# Patient Record
Sex: MALE | Employment: OTHER | URBAN - METROPOLITAN AREA
[De-identification: names, ages, dates, MRNs, and addresses within clinical notes are randomized per-mention and may not be internally consistent; named-entity substitution may affect disease eponyms.]

---

## 2017-01-01 ENCOUNTER — APPOINTMENT (OUTPATIENT)
Dept: RADIOLOGY | Facility: MEDICAL CENTER | Age: 62
DRG: 208 | End: 2017-01-01
Attending: INTERNAL MEDICINE
Payer: COMMERCIAL

## 2017-01-01 ENCOUNTER — APPOINTMENT (OUTPATIENT)
Dept: RADIOLOGY | Facility: MEDICAL CENTER | Age: 62
DRG: 208 | End: 2017-01-01
Attending: FAMILY MEDICINE
Payer: COMMERCIAL

## 2017-01-01 ENCOUNTER — HOSPITAL ENCOUNTER (OUTPATIENT)
Dept: RADIOLOGY | Facility: MEDICAL CENTER | Age: 62
End: 2017-08-16

## 2017-01-01 ENCOUNTER — APPOINTMENT (OUTPATIENT)
Dept: RADIOLOGY | Facility: MEDICAL CENTER | Age: 62
DRG: 208 | End: 2017-01-01
Attending: HOSPITALIST
Payer: COMMERCIAL

## 2017-01-01 ENCOUNTER — HOSPITAL ENCOUNTER (OUTPATIENT)
Dept: RADIOLOGY | Facility: MEDICAL CENTER | Age: 62
End: 2017-08-09

## 2017-01-01 ENCOUNTER — HOSPITAL ENCOUNTER (INPATIENT)
Facility: MEDICAL CENTER | Age: 62
LOS: 10 days | DRG: 208 | End: 2017-08-17
Attending: FAMILY MEDICINE | Admitting: FAMILY MEDICINE
Payer: COMMERCIAL

## 2017-01-01 ENCOUNTER — HOSPITAL ENCOUNTER (OUTPATIENT)
Facility: MEDICAL CENTER | Age: 62
DRG: 208 | End: 2017-08-07
Payer: COMMERCIAL

## 2017-01-01 VITALS
HEART RATE: 137 BPM | HEIGHT: 72 IN | BODY MASS INDEX: 22.66 KG/M2 | TEMPERATURE: 100.7 F | DIASTOLIC BLOOD PRESSURE: 90 MMHG | WEIGHT: 167.33 LBS | SYSTOLIC BLOOD PRESSURE: 129 MMHG | OXYGEN SATURATION: 4 %

## 2017-01-01 LAB
ALBUMIN SERPL BCP-MCNC: 1.9 G/DL (ref 3.2–4.9)
ALBUMIN SERPL BCP-MCNC: 2 G/DL (ref 3.2–4.9)
ALBUMIN SERPL BCP-MCNC: 2 G/DL (ref 3.2–4.9)
ALBUMIN SERPL BCP-MCNC: 2.1 G/DL (ref 3.2–4.9)
ALBUMIN SERPL BCP-MCNC: 2.4 G/DL (ref 3.2–4.9)
ALBUMIN SERPL BCP-MCNC: 2.5 G/DL (ref 3.2–4.9)
ALBUMIN SERPL BCP-MCNC: 2.7 G/DL (ref 3.2–4.9)
ALBUMIN SERPL BCP-MCNC: 3.2 G/DL (ref 3.2–4.9)
ALBUMIN SERPL BCP-MCNC: 3.5 G/DL (ref 3.2–4.9)
ALBUMIN/GLOB SERPL: 0.6 G/DL
ALBUMIN/GLOB SERPL: 0.7 G/DL
ALBUMIN/GLOB SERPL: 0.8 G/DL
ALBUMIN/GLOB SERPL: 0.8 G/DL
ALP SERPL-CCNC: 206 U/L (ref 30–99)
ALP SERPL-CCNC: 248 U/L (ref 30–99)
ALP SERPL-CCNC: 270 U/L (ref 30–99)
ALP SERPL-CCNC: 285 U/L (ref 30–99)
ALP SERPL-CCNC: 291 U/L (ref 30–99)
ALP SERPL-CCNC: 305 U/L (ref 30–99)
ALP SERPL-CCNC: 328 U/L (ref 30–99)
ALP SERPL-CCNC: 373 U/L (ref 30–99)
ALP SERPL-CCNC: 502 U/L (ref 30–99)
ALT SERPL-CCNC: 13 U/L (ref 2–50)
ALT SERPL-CCNC: 14 U/L (ref 2–50)
ALT SERPL-CCNC: 14 U/L (ref 2–50)
ALT SERPL-CCNC: 15 U/L (ref 2–50)
ALT SERPL-CCNC: 15 U/L (ref 2–50)
ALT SERPL-CCNC: 16 U/L (ref 2–50)
ALT SERPL-CCNC: 23 U/L (ref 2–50)
ALT SERPL-CCNC: 6 U/L (ref 2–50)
ALT SERPL-CCNC: 8 U/L (ref 2–50)
AMMONIA PLAS-SCNC: 25 UMOL/L (ref 11–45)
AMMONIA PLAS-SCNC: 49 UMOL/L (ref 11–45)
AMORPH CRY #/AREA URNS HPF: PRESENT /HPF
ANION GAP SERPL CALC-SCNC: 10 MMOL/L (ref 0–11.9)
ANION GAP SERPL CALC-SCNC: 11 MMOL/L (ref 0–11.9)
ANION GAP SERPL CALC-SCNC: 13 MMOL/L (ref 0–11.9)
ANION GAP SERPL CALC-SCNC: 14 MMOL/L (ref 0–11.9)
ANION GAP SERPL CALC-SCNC: 5 MMOL/L (ref 0–11.9)
ANION GAP SERPL CALC-SCNC: 6 MMOL/L (ref 0–11.9)
ANION GAP SERPL CALC-SCNC: 7 MMOL/L (ref 0–11.9)
ANION GAP SERPL CALC-SCNC: 8 MMOL/L (ref 0–11.9)
ANION GAP SERPL CALC-SCNC: 9 MMOL/L (ref 0–11.9)
APPEARANCE UR: CLEAR
AST SERPL-CCNC: 10 U/L (ref 12–45)
AST SERPL-CCNC: 16 U/L (ref 12–45)
AST SERPL-CCNC: 17 U/L (ref 12–45)
AST SERPL-CCNC: 18 U/L (ref 12–45)
AST SERPL-CCNC: 18 U/L (ref 12–45)
AST SERPL-CCNC: 19 U/L (ref 12–45)
AST SERPL-CCNC: 22 U/L (ref 12–45)
AST SERPL-CCNC: 29 U/L (ref 12–45)
AST SERPL-CCNC: 32 U/L (ref 12–45)
BACTERIA #/AREA URNS HPF: NEGATIVE /HPF
BACTERIA BLD CULT: NORMAL
BACTERIA BLD CULT: NORMAL
BACTERIA SPEC RESP CULT: ABNORMAL
BASE EXCESS BLDA CALC-SCNC: 2 MMOL/L (ref -4–3)
BASE EXCESS BLDA CALC-SCNC: 4 MMOL/L (ref -4–3)
BASE EXCESS BLDA CALC-SCNC: 5 MMOL/L (ref -4–3)
BASE EXCESS BLDA CALC-SCNC: 6 MMOL/L (ref -4–3)
BASOPHILS # BLD AUTO: 0 % (ref 0–1.8)
BASOPHILS # BLD AUTO: 0 % (ref 0–1.8)
BASOPHILS # BLD AUTO: 0.2 % (ref 0–1.8)
BASOPHILS # BLD AUTO: 0.2 % (ref 0–1.8)
BASOPHILS # BLD AUTO: 0.3 % (ref 0–1.8)
BASOPHILS # BLD AUTO: 0.9 % (ref 0–1.8)
BASOPHILS # BLD: 0 K/UL (ref 0–0.12)
BASOPHILS # BLD: 0 K/UL (ref 0–0.12)
BASOPHILS # BLD: 0.03 K/UL (ref 0–0.12)
BASOPHILS # BLD: 0.04 K/UL (ref 0–0.12)
BASOPHILS # BLD: 0.04 K/UL (ref 0–0.12)
BASOPHILS # BLD: 0.1 K/UL (ref 0–0.12)
BILIRUB SERPL-MCNC: 0.2 MG/DL (ref 0.1–1.5)
BILIRUB SERPL-MCNC: 0.2 MG/DL (ref 0.1–1.5)
BILIRUB SERPL-MCNC: 0.3 MG/DL (ref 0.1–1.5)
BILIRUB SERPL-MCNC: 0.4 MG/DL (ref 0.1–1.5)
BILIRUB SERPL-MCNC: 0.5 MG/DL (ref 0.1–1.5)
BILIRUB SERPL-MCNC: 0.6 MG/DL (ref 0.1–1.5)
BILIRUB UR QL STRIP.AUTO: NEGATIVE
BODY TEMPERATURE: ABNORMAL DEGREES
BUN SERPL-MCNC: 16 MG/DL (ref 8–22)
BUN SERPL-MCNC: 17 MG/DL (ref 8–22)
BUN SERPL-MCNC: 17 MG/DL (ref 8–22)
BUN SERPL-MCNC: 18 MG/DL (ref 8–22)
BUN SERPL-MCNC: 18 MG/DL (ref 8–22)
BUN SERPL-MCNC: 19 MG/DL (ref 8–22)
BUN SERPL-MCNC: 19 MG/DL (ref 8–22)
BUN SERPL-MCNC: 20 MG/DL (ref 8–22)
BUN SERPL-MCNC: 20 MG/DL (ref 8–22)
BUN SERPL-MCNC: 21 MG/DL (ref 8–22)
BUN SERPL-MCNC: 21 MG/DL (ref 8–22)
BUN SERPL-MCNC: 22 MG/DL (ref 8–22)
BUN SERPL-MCNC: 22 MG/DL (ref 8–22)
BUN SERPL-MCNC: 24 MG/DL (ref 8–22)
BUN SERPL-MCNC: 26 MG/DL (ref 8–22)
BUN SERPL-MCNC: 27 MG/DL (ref 8–22)
BUN SERPL-MCNC: 28 MG/DL (ref 8–22)
BUN SERPL-MCNC: 34 MG/DL (ref 8–22)
BUN SERPL-MCNC: 36 MG/DL (ref 8–22)
BUN SERPL-MCNC: 37 MG/DL (ref 8–22)
BUN SERPL-MCNC: 46 MG/DL (ref 8–22)
BUN SERPL-MCNC: 47 MG/DL (ref 8–22)
BUN SERPL-MCNC: 51 MG/DL (ref 8–22)
CA-I BLD ISE-SCNC: 1.95 MMOL/L (ref 1.1–1.3)
CA-I SERPL-SCNC: 1.6 MMOL/L (ref 1.1–1.3)
CA-I SERPL-SCNC: 1.7 MMOL/L (ref 1.1–1.3)
CALCIUM SERPL-MCNC: 10.2 MG/DL (ref 8.5–10.5)
CALCIUM SERPL-MCNC: 11 MG/DL (ref 8.5–10.5)
CALCIUM SERPL-MCNC: 11.2 MG/DL (ref 8.5–10.5)
CALCIUM SERPL-MCNC: 12.4 MG/DL (ref 8.5–10.5)
CALCIUM SERPL-MCNC: 12.8 MG/DL (ref 8.5–10.5)
CALCIUM SERPL-MCNC: 13.5 MG/DL (ref 8.4–10.2)
CALCIUM SERPL-MCNC: 13.5 MG/DL (ref 8.5–10.5)
CALCIUM SERPL-MCNC: 13.6 MG/DL (ref 8.5–10.5)
CALCIUM SERPL-MCNC: 14 MG/DL (ref 8.5–10.5)
CALCIUM SERPL-MCNC: 6.8 MG/DL (ref 8.5–10.5)
CALCIUM SERPL-MCNC: 6.9 MG/DL (ref 8.5–10.5)
CALCIUM SERPL-MCNC: 7.1 MG/DL (ref 8.5–10.5)
CALCIUM SERPL-MCNC: 7.4 MG/DL (ref 8.5–10.5)
CALCIUM SERPL-MCNC: 7.5 MG/DL (ref 8.5–10.5)
CALCIUM SERPL-MCNC: 7.7 MG/DL (ref 8.5–10.5)
CALCIUM SERPL-MCNC: 7.9 MG/DL (ref 8.5–10.5)
CALCIUM SERPL-MCNC: 8.2 MG/DL (ref 8.5–10.5)
CALCIUM SERPL-MCNC: 8.6 MG/DL (ref 8.5–10.5)
CALCIUM SERPL-MCNC: 8.7 MG/DL (ref 8.5–10.5)
CALCIUM SERPL-MCNC: 8.9 MG/DL (ref 8.5–10.5)
CALCIUM SERPL-MCNC: 9.2 MG/DL (ref 8.5–10.5)
CALCIUM SERPL-MCNC: 9.5 MG/DL (ref 8.5–10.5)
CALCIUM SERPL-MCNC: 9.8 MG/DL (ref 8.5–10.5)
CHLORIDE SERPL-SCNC: 105 MMOL/L (ref 96–112)
CHLORIDE SERPL-SCNC: 106 MMOL/L (ref 96–112)
CHLORIDE SERPL-SCNC: 106 MMOL/L (ref 96–112)
CHLORIDE SERPL-SCNC: 107 MMOL/L (ref 96–112)
CHLORIDE SERPL-SCNC: 107 MMOL/L (ref 96–112)
CHLORIDE SERPL-SCNC: 108 MMOL/L (ref 96–112)
CHLORIDE SERPL-SCNC: 109 MMOL/L (ref 96–112)
CHLORIDE SERPL-SCNC: 110 MMOL/L (ref 96–112)
CHLORIDE SERPL-SCNC: 111 MMOL/L (ref 96–112)
CHLORIDE SERPL-SCNC: 111 MMOL/L (ref 96–112)
CHLORIDE SERPL-SCNC: 112 MMOL/L (ref 96–112)
CHLORIDE SERPL-SCNC: 115 MMOL/L (ref 96–112)
CHLORIDE SERPL-SCNC: 116 MMOL/L (ref 96–112)
CHLORIDE SERPL-SCNC: 117 MMOL/L (ref 96–112)
CHLORIDE SERPL-SCNC: 122 MMOL/L (ref 96–112)
CHLORIDE SERPL-SCNC: 124 MMOL/L (ref 96–112)
CHLORIDE SERPL-SCNC: 126 MMOL/L (ref 96–112)
CHLORIDE SERPL-SCNC: 130 MMOL/L (ref 96–112)
CHLORIDE SERPL-SCNC: 131 MMOL/L (ref 96–112)
CHLORIDE SERPL-SCNC: 93 MMOL/L (ref 96–112)
CHLORIDE SERPL-SCNC: 95 MMOL/L (ref 96–112)
CHLORIDE UR-SCNC: 16 MMOL/L
CK SERPL-CCNC: 35 U/L (ref 0–154)
CO2 BLDA-SCNC: 28 MMOL/L (ref 20–33)
CO2 BLDA-SCNC: 30 MMOL/L (ref 20–33)
CO2 BLDA-SCNC: 30 MMOL/L (ref 20–33)
CO2 BLDA-SCNC: 32 MMOL/L (ref 20–33)
CO2 SERPL-SCNC: 20 MMOL/L (ref 20–33)
CO2 SERPL-SCNC: 20 MMOL/L (ref 20–33)
CO2 SERPL-SCNC: 22 MMOL/L (ref 20–33)
CO2 SERPL-SCNC: 22 MMOL/L (ref 20–33)
CO2 SERPL-SCNC: 23 MMOL/L (ref 20–33)
CO2 SERPL-SCNC: 23 MMOL/L (ref 20–33)
CO2 SERPL-SCNC: 24 MMOL/L (ref 20–33)
CO2 SERPL-SCNC: 24 MMOL/L (ref 20–33)
CO2 SERPL-SCNC: 25 MMOL/L (ref 20–33)
CO2 SERPL-SCNC: 25 MMOL/L (ref 20–33)
CO2 SERPL-SCNC: 26 MMOL/L (ref 20–33)
CO2 SERPL-SCNC: 26 MMOL/L (ref 20–33)
CO2 SERPL-SCNC: 27 MMOL/L (ref 20–33)
CO2 SERPL-SCNC: 28 MMOL/L (ref 20–33)
CO2 SERPL-SCNC: 29 MMOL/L (ref 20–33)
COLOR UR: YELLOW
CREAT SERPL-MCNC: 0.76 MG/DL (ref 0.5–1.4)
CREAT SERPL-MCNC: 0.89 MG/DL (ref 0.5–1.4)
CREAT SERPL-MCNC: 0.89 MG/DL (ref 0.5–1.4)
CREAT SERPL-MCNC: 0.97 MG/DL (ref 0.5–1.4)
CREAT SERPL-MCNC: 0.98 MG/DL (ref 0.5–1.4)
CREAT SERPL-MCNC: 1 MG/DL (ref 0.5–1.4)
CREAT SERPL-MCNC: 1.02 MG/DL (ref 0.5–1.4)
CREAT SERPL-MCNC: 1.03 MG/DL (ref 0.5–1.4)
CREAT SERPL-MCNC: 1.06 MG/DL (ref 0.5–1.4)
CREAT SERPL-MCNC: 1.06 MG/DL (ref 0.5–1.4)
CREAT SERPL-MCNC: 1.09 MG/DL (ref 0.5–1.4)
CREAT SERPL-MCNC: 1.12 MG/DL (ref 0.5–1.4)
CREAT SERPL-MCNC: 1.13 MG/DL (ref 0.5–1.4)
CREAT SERPL-MCNC: 1.18 MG/DL (ref 0.5–1.4)
CREAT SERPL-MCNC: 1.19 MG/DL (ref 0.5–1.4)
CREAT SERPL-MCNC: 1.35 MG/DL (ref 0.5–1.4)
CREAT SERPL-MCNC: 1.41 MG/DL (ref 0.5–1.4)
CREAT SERPL-MCNC: 1.45 MG/DL (ref 0.5–1.4)
CREAT SERPL-MCNC: 1.52 MG/DL (ref 0.5–1.4)
CREAT SERPL-MCNC: 1.59 MG/DL (ref 0.5–1.4)
CREAT SERPL-MCNC: 1.89 MG/DL (ref 0.5–1.4)
CREAT SERPL-MCNC: 2.37 MG/DL (ref 0.5–1.4)
CREAT SERPL-MCNC: 2.39 MG/DL (ref 0.5–1.4)
CREAT UR-MCNC: 39.6 MG/DL
CRP SERPL HS-MCNC: 17.3 MG/DL (ref 0–0.75)
EKG IMPRESSION: NORMAL
EOSINOPHIL # BLD AUTO: 0.15 K/UL (ref 0–0.51)
EOSINOPHIL # BLD AUTO: 0.17 K/UL (ref 0–0.51)
EOSINOPHIL # BLD AUTO: 0.17 K/UL (ref 0–0.51)
EOSINOPHIL # BLD AUTO: 0.47 K/UL (ref 0–0.51)
EOSINOPHIL # BLD AUTO: 0.57 K/UL (ref 0–0.51)
EOSINOPHIL # BLD AUTO: 1.02 K/UL (ref 0–0.51)
EOSINOPHIL NFR BLD: 0.9 % (ref 0–6.9)
EOSINOPHIL NFR BLD: 1 % (ref 0–6.9)
EOSINOPHIL NFR BLD: 1.1 % (ref 0–6.9)
EOSINOPHIL NFR BLD: 3.8 % (ref 0–6.9)
EOSINOPHIL NFR BLD: 4.3 % (ref 0–6.9)
EOSINOPHIL NFR BLD: 8.7 % (ref 0–6.9)
EPI CELLS #/AREA URNS HPF: ABNORMAL /HPF
ERYTHROCYTE [DISTWIDTH] IN BLOOD BY AUTOMATED COUNT: 39.3 FL (ref 35.9–50)
ERYTHROCYTE [DISTWIDTH] IN BLOOD BY AUTOMATED COUNT: 41.1 FL (ref 35.9–50)
ERYTHROCYTE [DISTWIDTH] IN BLOOD BY AUTOMATED COUNT: 42.5 FL (ref 35.9–50)
ERYTHROCYTE [DISTWIDTH] IN BLOOD BY AUTOMATED COUNT: 43.9 FL (ref 35.9–50)
ERYTHROCYTE [DISTWIDTH] IN BLOOD BY AUTOMATED COUNT: 44.4 FL (ref 35.9–50)
ERYTHROCYTE [DISTWIDTH] IN BLOOD BY AUTOMATED COUNT: 44.7 FL (ref 35.9–50)
ERYTHROCYTE [DISTWIDTH] IN BLOOD BY AUTOMATED COUNT: 45 FL (ref 35.9–50)
ERYTHROCYTE [DISTWIDTH] IN BLOOD BY AUTOMATED COUNT: 45.1 FL (ref 35.9–50)
ERYTHROCYTE [DISTWIDTH] IN BLOOD BY AUTOMATED COUNT: 46.1 FL (ref 35.9–50)
ERYTHROCYTE [DISTWIDTH] IN BLOOD BY AUTOMATED COUNT: 46.4 FL (ref 35.9–50)
ERYTHROCYTE [DISTWIDTH] IN BLOOD BY AUTOMATED COUNT: 47.4 FL (ref 35.9–50)
ERYTHROCYTE [DISTWIDTH] IN BLOOD BY AUTOMATED COUNT: 47.8 FL (ref 35.9–50)
GFR SERPL CREATININE-BSD FRML MDRD: 28 ML/MIN/1.73 M 2
GFR SERPL CREATININE-BSD FRML MDRD: 28 ML/MIN/1.73 M 2
GFR SERPL CREATININE-BSD FRML MDRD: 36 ML/MIN/1.73 M 2
GFR SERPL CREATININE-BSD FRML MDRD: 44 ML/MIN/1.73 M 2
GFR SERPL CREATININE-BSD FRML MDRD: 46 ML/MIN/1.73 M 2
GFR SERPL CREATININE-BSD FRML MDRD: 49 ML/MIN/1.73 M 2
GFR SERPL CREATININE-BSD FRML MDRD: 51 ML/MIN/1.73 M 2
GFR SERPL CREATININE-BSD FRML MDRD: 53 ML/MIN/1.73 M 2
GFR SERPL CREATININE-BSD FRML MDRD: >60 ML/MIN/1.73 M 2
GLOBULIN SER CALC-MCNC: 3 G/DL (ref 1.9–3.5)
GLOBULIN SER CALC-MCNC: 3.2 G/DL (ref 1.9–3.5)
GLOBULIN SER CALC-MCNC: 3.2 G/DL (ref 1.9–3.5)
GLOBULIN SER CALC-MCNC: 3.3 G/DL (ref 1.9–3.5)
GLOBULIN SER CALC-MCNC: 3.3 G/DL (ref 1.9–3.5)
GLOBULIN SER CALC-MCNC: 3.5 G/DL (ref 1.9–3.5)
GLOBULIN SER CALC-MCNC: 3.5 G/DL (ref 1.9–3.5)
GLOBULIN SER CALC-MCNC: 4.2 G/DL (ref 1.9–3.5)
GLOBULIN SER CALC-MCNC: 4.3 G/DL (ref 1.9–3.5)
GLUCOSE BLD-MCNC: 106 MG/DL (ref 65–99)
GLUCOSE BLD-MCNC: 116 MG/DL (ref 65–99)
GLUCOSE BLD-MCNC: 129 MG/DL (ref 65–99)
GLUCOSE BLD-MCNC: 148 MG/DL (ref 65–99)
GLUCOSE BLD-MCNC: 160 MG/DL (ref 65–99)
GLUCOSE BLD-MCNC: 161 MG/DL (ref 65–99)
GLUCOSE BLD-MCNC: 180 MG/DL (ref 65–99)
GLUCOSE SERPL-MCNC: 101 MG/DL (ref 65–99)
GLUCOSE SERPL-MCNC: 101 MG/DL (ref 65–99)
GLUCOSE SERPL-MCNC: 104 MG/DL (ref 65–99)
GLUCOSE SERPL-MCNC: 108 MG/DL (ref 65–99)
GLUCOSE SERPL-MCNC: 111 MG/DL (ref 65–99)
GLUCOSE SERPL-MCNC: 112 MG/DL (ref 65–99)
GLUCOSE SERPL-MCNC: 114 MG/DL (ref 65–99)
GLUCOSE SERPL-MCNC: 119 MG/DL (ref 65–99)
GLUCOSE SERPL-MCNC: 120 MG/DL (ref 65–99)
GLUCOSE SERPL-MCNC: 121 MG/DL (ref 65–99)
GLUCOSE SERPL-MCNC: 130 MG/DL (ref 65–99)
GLUCOSE SERPL-MCNC: 135 MG/DL (ref 65–99)
GLUCOSE SERPL-MCNC: 135 MG/DL (ref 65–99)
GLUCOSE SERPL-MCNC: 138 MG/DL (ref 65–99)
GLUCOSE SERPL-MCNC: 139 MG/DL (ref 65–99)
GLUCOSE SERPL-MCNC: 161 MG/DL (ref 65–99)
GLUCOSE SERPL-MCNC: 164 MG/DL (ref 65–99)
GLUCOSE SERPL-MCNC: 172 MG/DL (ref 65–99)
GLUCOSE SERPL-MCNC: 203 MG/DL (ref 65–99)
GLUCOSE SERPL-MCNC: 86 MG/DL (ref 65–99)
GLUCOSE SERPL-MCNC: 93 MG/DL (ref 65–99)
GLUCOSE SERPL-MCNC: 97 MG/DL (ref 65–99)
GLUCOSE SERPL-MCNC: 99 MG/DL (ref 65–99)
GLUCOSE UR STRIP.AUTO-MCNC: NEGATIVE MG/DL
GRAM STN SPEC: ABNORMAL
GRAM STN SPEC: NORMAL
HCO3 BLDA-SCNC: 27.3 MMOL/L (ref 17–25)
HCO3 BLDA-SCNC: 28.8 MMOL/L (ref 17–25)
HCO3 BLDA-SCNC: 29.1 MMOL/L (ref 17–25)
HCO3 BLDA-SCNC: 30.5 MMOL/L (ref 17–25)
HCT VFR BLD AUTO: 26.1 % (ref 42–52)
HCT VFR BLD AUTO: 26.2 % (ref 42–52)
HCT VFR BLD AUTO: 27.6 % (ref 42–52)
HCT VFR BLD AUTO: 29.4 % (ref 42–52)
HCT VFR BLD AUTO: 35.8 % (ref 42–52)
HCT VFR BLD AUTO: 36.1 % (ref 42–52)
HCT VFR BLD AUTO: 39.4 % (ref 42–52)
HCT VFR BLD AUTO: 41.5 % (ref 42–52)
HCT VFR BLD AUTO: 41.9 % (ref 42–52)
HCT VFR BLD AUTO: 42.1 % (ref 42–52)
HCT VFR BLD AUTO: 48.1 % (ref 42–52)
HCT VFR BLD AUTO: 52.8 % (ref 42–52)
HCT VFR BLD CALC: 40 % (ref 42–52)
HGB BLD-MCNC: 11.8 G/DL (ref 14–18)
HGB BLD-MCNC: 12 G/DL (ref 14–18)
HGB BLD-MCNC: 13 G/DL (ref 14–18)
HGB BLD-MCNC: 13.6 G/DL (ref 14–18)
HGB BLD-MCNC: 14.3 G/DL (ref 14–18)
HGB BLD-MCNC: 14.8 G/DL (ref 14–18)
HGB BLD-MCNC: 14.9 G/DL (ref 14–18)
HGB BLD-MCNC: 15.8 G/DL (ref 14–18)
HGB BLD-MCNC: 16.2 G/DL (ref 14–18)
HGB BLD-MCNC: 8.4 G/DL (ref 14–18)
HGB BLD-MCNC: 8.5 G/DL (ref 14–18)
HGB BLD-MCNC: 9.2 G/DL (ref 14–18)
HGB BLD-MCNC: 9.9 G/DL (ref 14–18)
HYALINE CASTS #/AREA URNS LPF: ABNORMAL /LPF
IMM GRANULOCYTES # BLD AUTO: 0.15 K/UL (ref 0–0.11)
IMM GRANULOCYTES # BLD AUTO: 0.17 K/UL (ref 0–0.11)
IMM GRANULOCYTES # BLD AUTO: 0.17 K/UL (ref 0–0.11)
IMM GRANULOCYTES NFR BLD AUTO: 0.9 % (ref 0–0.9)
IMM GRANULOCYTES NFR BLD AUTO: 1.1 % (ref 0–0.9)
IMM GRANULOCYTES NFR BLD AUTO: 1.1 % (ref 0–0.9)
INR PPP: 1.65 (ref 0.87–1.13)
KETONES UR STRIP.AUTO-MCNC: NEGATIVE MG/DL
LACTATE BLD-SCNC: 1.6 MMOL/L (ref 0.5–2)
LACTATE BLD-SCNC: 2.4 MMOL/L (ref 0.5–2)
LEUKOCYTE ESTERASE UR QL STRIP.AUTO: NEGATIVE
LIPASE SERPL-CCNC: 14 U/L (ref 11–82)
LYMPHOCYTES # BLD AUTO: 1.02 K/UL (ref 1–4.8)
LYMPHOCYTES # BLD AUTO: 1.11 K/UL (ref 1–4.8)
LYMPHOCYTES # BLD AUTO: 1.11 K/UL (ref 1–4.8)
LYMPHOCYTES # BLD AUTO: 1.19 K/UL (ref 1–4.8)
LYMPHOCYTES # BLD AUTO: 1.54 K/UL (ref 1–4.8)
LYMPHOCYTES # BLD AUTO: 1.73 K/UL (ref 1–4.8)
LYMPHOCYTES NFR BLD: 10.3 % (ref 22–41)
LYMPHOCYTES NFR BLD: 15.7 % (ref 22–41)
LYMPHOCYTES NFR BLD: 6.1 % (ref 22–41)
LYMPHOCYTES NFR BLD: 7.1 % (ref 22–41)
LYMPHOCYTES NFR BLD: 7.2 % (ref 22–41)
LYMPHOCYTES NFR BLD: 9.5 % (ref 22–41)
MAGNESIUM SERPL-MCNC: 1.3 MG/DL (ref 1.5–2.5)
MAGNESIUM SERPL-MCNC: 1.5 MG/DL (ref 1.5–2.5)
MAGNESIUM SERPL-MCNC: 1.5 MG/DL (ref 1.5–2.5)
MAGNESIUM SERPL-MCNC: 1.7 MG/DL (ref 1.5–2.5)
MAGNESIUM SERPL-MCNC: 1.8 MG/DL (ref 1.5–2.5)
MAGNESIUM SERPL-MCNC: 2 MG/DL (ref 1.5–2.5)
MAGNESIUM SERPL-MCNC: 2.1 MG/DL (ref 1.5–2.5)
MAGNESIUM SERPL-MCNC: 2.2 MG/DL (ref 1.5–2.5)
MANUAL DIFF BLD: NORMAL
MCH RBC QN AUTO: 31.9 PG (ref 27–33)
MCH RBC QN AUTO: 31.9 PG (ref 27–33)
MCH RBC QN AUTO: 32 PG (ref 27–33)
MCH RBC QN AUTO: 32.1 PG (ref 27–33)
MCH RBC QN AUTO: 32.3 PG (ref 27–33)
MCH RBC QN AUTO: 32.4 PG (ref 27–33)
MCH RBC QN AUTO: 32.5 PG (ref 27–33)
MCH RBC QN AUTO: 32.8 PG (ref 27–33)
MCH RBC QN AUTO: 32.9 PG (ref 27–33)
MCH RBC QN AUTO: 33 PG (ref 27–33)
MCHC RBC AUTO-ENTMCNC: 30.7 G/DL (ref 33.7–35.3)
MCHC RBC AUTO-ENTMCNC: 32.2 G/DL (ref 33.7–35.3)
MCHC RBC AUTO-ENTMCNC: 32.4 G/DL (ref 33.7–35.3)
MCHC RBC AUTO-ENTMCNC: 32.8 G/DL (ref 33.7–35.3)
MCHC RBC AUTO-ENTMCNC: 33 G/DL (ref 33.7–35.3)
MCHC RBC AUTO-ENTMCNC: 33 G/DL (ref 33.7–35.3)
MCHC RBC AUTO-ENTMCNC: 33.2 G/DL (ref 33.7–35.3)
MCHC RBC AUTO-ENTMCNC: 33.3 G/DL (ref 33.7–35.3)
MCHC RBC AUTO-ENTMCNC: 33.7 G/DL (ref 33.7–35.3)
MCHC RBC AUTO-ENTMCNC: 34.1 G/DL (ref 33.7–35.3)
MCHC RBC AUTO-ENTMCNC: 35.2 G/DL (ref 33.7–35.3)
MCHC RBC AUTO-ENTMCNC: 35.9 G/DL (ref 33.7–35.3)
MCV RBC AUTO: 105.4 FL (ref 81.4–97.8)
MCV RBC AUTO: 91.4 FL (ref 81.4–97.8)
MCV RBC AUTO: 93.3 FL (ref 81.4–97.8)
MCV RBC AUTO: 96.1 FL (ref 81.4–97.8)
MCV RBC AUTO: 96.5 FL (ref 81.4–97.8)
MCV RBC AUTO: 96.8 FL (ref 81.4–97.8)
MCV RBC AUTO: 97.4 FL (ref 81.4–97.8)
MCV RBC AUTO: 97.5 FL (ref 81.4–97.8)
MCV RBC AUTO: 98.9 FL (ref 81.4–97.8)
MCV RBC AUTO: 99.2 FL (ref 81.4–97.8)
MCV RBC AUTO: 99.2 FL (ref 81.4–97.8)
MCV RBC AUTO: 99.5 FL (ref 81.4–97.8)
METAMYELOCYTES NFR BLD MANUAL: 0.9 %
METAMYELOCYTES NFR BLD MANUAL: 0.9 %
MICRO URNS: ABNORMAL
MONOCYTES # BLD AUTO: 0.41 K/UL (ref 0–0.85)
MONOCYTES # BLD AUTO: 0.57 K/UL (ref 0–0.85)
MONOCYTES # BLD AUTO: 1.03 K/UL (ref 0–0.85)
MONOCYTES # BLD AUTO: 1.04 K/UL (ref 0–0.85)
MONOCYTES # BLD AUTO: 1.34 K/UL (ref 0–0.85)
MONOCYTES # BLD AUTO: 1.51 K/UL (ref 0–0.85)
MONOCYTES NFR BLD AUTO: 3.5 % (ref 0–13.4)
MONOCYTES NFR BLD AUTO: 5.2 % (ref 0–13.4)
MONOCYTES NFR BLD AUTO: 6.2 % (ref 0–13.4)
MONOCYTES NFR BLD AUTO: 6.7 % (ref 0–13.4)
MONOCYTES NFR BLD AUTO: 9 % (ref 0–13.4)
MONOCYTES NFR BLD AUTO: 9 % (ref 0–13.4)
MORPHOLOGY BLD-IMP: NORMAL
MYELOCYTES NFR BLD MANUAL: 0.9 %
NEUTROPHILS # BLD AUTO: 11.25 K/UL (ref 1.82–7.42)
NEUTROPHILS # BLD AUTO: 12.94 K/UL (ref 1.82–7.42)
NEUTROPHILS # BLD AUTO: 13.91 K/UL (ref 1.82–7.42)
NEUTROPHILS # BLD AUTO: 14.41 K/UL (ref 1.82–7.42)
NEUTROPHILS # BLD AUTO: 8.03 K/UL (ref 1.82–7.42)
NEUTROPHILS # BLD AUTO: 8.95 K/UL (ref 1.82–7.42)
NEUTROPHILS NFR BLD: 70.4 % (ref 44–72)
NEUTROPHILS NFR BLD: 70.4 % (ref 44–72)
NEUTROPHILS NFR BLD: 75.5 % (ref 44–72)
NEUTROPHILS NFR BLD: 82.8 % (ref 44–72)
NEUTROPHILS NFR BLD: 83.7 % (ref 44–72)
NEUTROPHILS NFR BLD: 85.8 % (ref 44–72)
NEUTS BAND NFR BLD MANUAL: 2.6 % (ref 0–10)
NEUTS BAND NFR BLD MANUAL: 6.1 % (ref 0–10)
NITRITE UR QL STRIP.AUTO: NEGATIVE
NRBC # BLD AUTO: 0 K/UL
NRBC # BLD AUTO: 0.02 K/UL
NRBC BLD AUTO-RTO: 0 /100 WBC
NRBC BLD AUTO-RTO: 0.2 /100 WBC
O2/TOTAL GAS SETTING VFR VENT: 21 %
O2/TOTAL GAS SETTING VFR VENT: 30 %
O2/TOTAL GAS SETTING VFR VENT: 40 %
O2/TOTAL GAS SETTING VFR VENT: 80 %
OSMOLALITY SERPL: 342 MOSM/KG H2O (ref 278–298)
OSMOLALITY SERPL: 349 MOSM/KG H2O (ref 278–298)
OSMOLALITY UR: 199 MOSM/KG H2O (ref 300–900)
OSMOLALITY UR: 405 MOSM/KG H2O (ref 300–900)
PCO2 BLDA: 33.1 MMHG (ref 26–37)
PCO2 BLDA: 37.9 MMHG (ref 26–37)
PCO2 BLDA: 43 MMHG (ref 26–37)
PCO2 BLDA: 54.2 MMHG (ref 26–37)
PCO2 TEMP ADJ BLDA: 35.5 MMHG (ref 26–37)
PCO2 TEMP ADJ BLDA: 39.1 MMHG (ref 26–37)
PCO2 TEMP ADJ BLDA: 44.6 MMHG (ref 26–37)
PCO2 TEMP ADJ BLDA: 53.7 MMHG (ref 26–37)
PH BLDA: 7.33 [PH] (ref 7.4–7.5)
PH BLDA: 7.46 [PH] (ref 7.4–7.5)
PH BLDA: 7.49 [PH] (ref 7.4–7.5)
PH BLDA: 7.52 [PH] (ref 7.4–7.5)
PH TEMP ADJ BLDA: 7.34 [PH] (ref 7.4–7.5)
PH TEMP ADJ BLDA: 7.45 [PH] (ref 7.4–7.5)
PH TEMP ADJ BLDA: 7.48 [PH] (ref 7.4–7.5)
PH TEMP ADJ BLDA: 7.5 [PH] (ref 7.4–7.5)
PH UR STRIP.AUTO: 5 [PH]
PHOSPHATE SERPL-MCNC: 1.8 MG/DL (ref 2.5–4.5)
PHOSPHATE SERPL-MCNC: 2.1 MG/DL (ref 2.5–4.5)
PHOSPHATE SERPL-MCNC: 2.3 MG/DL (ref 2.5–4.5)
PHOSPHATE SERPL-MCNC: 2.5 MG/DL (ref 2.5–4.5)
PHOSPHATE SERPL-MCNC: 2.6 MG/DL (ref 2.5–4.5)
PHOSPHATE SERPL-MCNC: 2.8 MG/DL (ref 2.5–4.5)
PHOSPHATE SERPL-MCNC: 2.9 MG/DL (ref 2.5–4.5)
PHOSPHATE SERPL-MCNC: 3.1 MG/DL (ref 2.5–4.5)
PLATELET # BLD AUTO: 100 K/UL (ref 164–446)
PLATELET # BLD AUTO: 101 K/UL (ref 164–446)
PLATELET # BLD AUTO: 118 K/UL (ref 164–446)
PLATELET # BLD AUTO: 132 K/UL (ref 164–446)
PLATELET # BLD AUTO: 135 K/UL (ref 164–446)
PLATELET # BLD AUTO: 150 K/UL (ref 164–446)
PLATELET # BLD AUTO: 154 K/UL (ref 164–446)
PLATELET # BLD AUTO: 196 K/UL (ref 164–446)
PLATELET # BLD AUTO: 206 K/UL (ref 164–446)
PLATELET # BLD AUTO: 216 K/UL (ref 164–446)
PLATELET # BLD AUTO: 220 K/UL (ref 164–446)
PLATELET # BLD AUTO: 240 K/UL (ref 164–446)
PLATELET BLD QL SMEAR: NORMAL
PMV BLD AUTO: 10.2 FL (ref 9–12.9)
PMV BLD AUTO: 10.6 FL (ref 9–12.9)
PMV BLD AUTO: 10.8 FL (ref 9–12.9)
PMV BLD AUTO: 10.8 FL (ref 9–12.9)
PMV BLD AUTO: 9.1 FL (ref 9–12.9)
PMV BLD AUTO: 9.3 FL (ref 9–12.9)
PMV BLD AUTO: 9.4 FL (ref 9–12.9)
PMV BLD AUTO: 9.4 FL (ref 9–12.9)
PMV BLD AUTO: 9.5 FL (ref 9–12.9)
PMV BLD AUTO: 9.6 FL (ref 9–12.9)
PMV BLD AUTO: 9.6 FL (ref 9–12.9)
PMV BLD AUTO: 9.9 FL (ref 9–12.9)
PO2 BLDA: 109 MMHG (ref 64–87)
PO2 BLDA: 18 MMHG (ref 64–87)
PO2 BLDA: 205 MMHG (ref 64–87)
PO2 BLDA: 76 MMHG (ref 64–87)
PO2 TEMP ADJ BLDA: 113 MMHG (ref 64–87)
PO2 TEMP ADJ BLDA: 18 MMHG (ref 64–87)
PO2 TEMP ADJ BLDA: 209 MMHG (ref 64–87)
PO2 TEMP ADJ BLDA: 85 MMHG (ref 64–87)
POTASSIUM BLD-SCNC: 3.9 MMOL/L (ref 3.6–5.5)
POTASSIUM SERPL-SCNC: 3.1 MMOL/L (ref 3.6–5.5)
POTASSIUM SERPL-SCNC: 3.2 MMOL/L (ref 3.6–5.5)
POTASSIUM SERPL-SCNC: 3.2 MMOL/L (ref 3.6–5.5)
POTASSIUM SERPL-SCNC: 3.3 MMOL/L (ref 3.6–5.5)
POTASSIUM SERPL-SCNC: 3.3 MMOL/L (ref 3.6–5.5)
POTASSIUM SERPL-SCNC: 3.5 MMOL/L (ref 3.6–5.5)
POTASSIUM SERPL-SCNC: 3.5 MMOL/L (ref 3.6–5.5)
POTASSIUM SERPL-SCNC: 3.6 MMOL/L (ref 3.6–5.5)
POTASSIUM SERPL-SCNC: 3.7 MMOL/L (ref 3.6–5.5)
POTASSIUM SERPL-SCNC: 3.8 MMOL/L (ref 3.6–5.5)
POTASSIUM SERPL-SCNC: 3.8 MMOL/L (ref 3.6–5.5)
POTASSIUM SERPL-SCNC: 3.9 MMOL/L (ref 3.6–5.5)
POTASSIUM SERPL-SCNC: 4.1 MMOL/L (ref 3.6–5.5)
POTASSIUM SERPL-SCNC: 4.1 MMOL/L (ref 3.6–5.5)
POTASSIUM SERPL-SCNC: 4.4 MMOL/L (ref 3.6–5.5)
POTASSIUM SERPL-SCNC: 4.5 MMOL/L (ref 3.6–5.5)
POTASSIUM SERPL-SCNC: 4.7 MMOL/L (ref 3.6–5.5)
PREALB SERPL-MCNC: <3 MG/DL (ref 18–38)
PROT SERPL-MCNC: 4.9 G/DL (ref 6–8.2)
PROT SERPL-MCNC: 5.2 G/DL (ref 6–8.2)
PROT SERPL-MCNC: 5.2 G/DL (ref 6–8.2)
PROT SERPL-MCNC: 5.4 G/DL (ref 6–8.2)
PROT SERPL-MCNC: 5.7 G/DL (ref 6–8.2)
PROT SERPL-MCNC: 6 G/DL (ref 6–8.2)
PROT SERPL-MCNC: 6.2 G/DL (ref 6–8.2)
PROT SERPL-MCNC: 7.5 G/DL (ref 6–8.2)
PROT SERPL-MCNC: 7.7 G/DL (ref 6–8.2)
PROT UR QL STRIP: NEGATIVE MG/DL
PROT UR-MCNC: 21 MG/DL (ref 0–15)
PROTHROMBIN TIME: 20 SEC (ref 12–14.6)
RBC # BLD AUTO: 2.63 M/UL (ref 4.7–6.1)
RBC # BLD AUTO: 2.65 M/UL (ref 4.7–6.1)
RBC # BLD AUTO: 2.83 M/UL (ref 4.7–6.1)
RBC # BLD AUTO: 3.06 M/UL (ref 4.7–6.1)
RBC # BLD AUTO: 3.64 M/UL (ref 4.7–6.1)
RBC # BLD AUTO: 3.7 M/UL (ref 4.7–6.1)
RBC # BLD AUTO: 3.96 M/UL (ref 4.7–6.1)
RBC # BLD AUTO: 4.34 M/UL (ref 4.7–6.1)
RBC # BLD AUTO: 4.51 M/UL (ref 4.7–6.1)
RBC # BLD AUTO: 4.54 M/UL (ref 4.7–6.1)
RBC # BLD AUTO: 4.94 M/UL (ref 4.7–6.1)
RBC # BLD AUTO: 5.01 M/UL (ref 4.7–6.1)
RBC # URNS HPF: ABNORMAL /HPF
RBC BLD AUTO: NORMAL
RBC BLD AUTO: PRESENT
RBC BLD AUTO: PRESENT
RBC UR QL AUTO: ABNORMAL
SAO2 % BLDA: 100 % (ref 93–99)
SAO2 % BLDA: 23 % (ref 93–99)
SAO2 % BLDA: 97 % (ref 93–99)
SAO2 % BLDA: 99 % (ref 93–99)
SIGNIFICANT IND 70042: ABNORMAL
SIGNIFICANT IND 70042: NORMAL
SITE SITE: ABNORMAL
SITE SITE: NORMAL
SMUDGE CELLS BLD QL SMEAR: NORMAL
SMUDGE CELLS BLD QL SMEAR: NORMAL
SODIUM BLD-SCNC: 162 MMOL/L (ref 135–145)
SODIUM SERPL-SCNC: 132 MMOL/L (ref 135–145)
SODIUM SERPL-SCNC: 133 MMOL/L (ref 135–145)
SODIUM SERPL-SCNC: 136 MMOL/L (ref 135–145)
SODIUM SERPL-SCNC: 137 MMOL/L (ref 135–145)
SODIUM SERPL-SCNC: 139 MMOL/L (ref 135–145)
SODIUM SERPL-SCNC: 140 MMOL/L (ref 135–145)
SODIUM SERPL-SCNC: 141 MMOL/L (ref 135–145)
SODIUM SERPL-SCNC: 143 MMOL/L (ref 135–145)
SODIUM SERPL-SCNC: 144 MMOL/L (ref 135–145)
SODIUM SERPL-SCNC: 145 MMOL/L (ref 135–145)
SODIUM SERPL-SCNC: 147 MMOL/L (ref 135–145)
SODIUM SERPL-SCNC: 148 MMOL/L (ref 135–145)
SODIUM SERPL-SCNC: 148 MMOL/L (ref 135–145)
SODIUM SERPL-SCNC: 151 MMOL/L (ref 135–145)
SODIUM SERPL-SCNC: 157 MMOL/L (ref 135–145)
SODIUM SERPL-SCNC: 158 MMOL/L (ref 135–145)
SODIUM SERPL-SCNC: 159 MMOL/L (ref 135–145)
SODIUM SERPL-SCNC: 160 MMOL/L (ref 135–145)
SODIUM SERPL-SCNC: 161 MMOL/L (ref 135–145)
SODIUM SERPL-SCNC: 163 MMOL/L (ref 135–145)
SODIUM UR-SCNC: 87 MMOL/L
SODIUM UR-SCNC: <10 MMOL/L
SOURCE SOURCE: ABNORMAL
SOURCE SOURCE: NORMAL
SP GR UR STRIP.AUTO: 1.01
SPECIMEN DRAWN FROM PATIENT: ABNORMAL
TRIGL SERPL-MCNC: 154 MG/DL (ref 0–149)
UROBILINOGEN UR STRIP.AUTO-MCNC: 0.2 MG/DL
WBC # BLD AUTO: 10.5 K/UL (ref 4.8–10.8)
WBC # BLD AUTO: 11 K/UL (ref 4.8–10.8)
WBC # BLD AUTO: 11 K/UL (ref 4.8–10.8)
WBC # BLD AUTO: 11.2 K/UL (ref 4.8–10.8)
WBC # BLD AUTO: 11.7 K/UL (ref 4.8–10.8)
WBC # BLD AUTO: 13.1 K/UL (ref 4.8–10.8)
WBC # BLD AUTO: 13.5 K/UL (ref 4.8–10.8)
WBC # BLD AUTO: 14.9 K/UL (ref 4.8–10.8)
WBC # BLD AUTO: 15.5 K/UL (ref 4.8–10.8)
WBC # BLD AUTO: 15.9 K/UL (ref 4.8–10.8)
WBC # BLD AUTO: 16.8 K/UL (ref 4.8–10.8)
WBC # BLD AUTO: 16.8 K/UL (ref 4.8–10.8)
WBC #/AREA URNS HPF: ABNORMAL /HPF

## 2017-01-01 PROCEDURE — 31720 CLEARANCE OF AIRWAYS: CPT

## 2017-01-01 PROCEDURE — 82803 BLOOD GASES ANY COMBINATION: CPT

## 2017-01-01 PROCEDURE — A9270 NON-COVERED ITEM OR SERVICE: HCPCS | Performed by: FAMILY MEDICINE

## 2017-01-01 PROCEDURE — 700105 HCHG RX REV CODE 258: Performed by: INTERNAL MEDICINE

## 2017-01-01 PROCEDURE — 36600 WITHDRAWAL OF ARTERIAL BLOOD: CPT

## 2017-01-01 PROCEDURE — 84100 ASSAY OF PHOSPHORUS: CPT

## 2017-01-01 PROCEDURE — 700102 HCHG RX REV CODE 250 W/ 637 OVERRIDE(OP): Performed by: FAMILY MEDICINE

## 2017-01-01 PROCEDURE — 700105 HCHG RX REV CODE 258

## 2017-01-01 PROCEDURE — 83735 ASSAY OF MAGNESIUM: CPT

## 2017-01-01 PROCEDURE — 700102 HCHG RX REV CODE 250 W/ 637 OVERRIDE(OP): Performed by: INTERNAL MEDICINE

## 2017-01-01 PROCEDURE — 87040 BLOOD CULTURE FOR BACTERIA: CPT

## 2017-01-01 PROCEDURE — 700117 HCHG RX CONTRAST REV CODE 255: Performed by: FAMILY MEDICINE

## 2017-01-01 PROCEDURE — 80048 BASIC METABOLIC PNL TOTAL CA: CPT

## 2017-01-01 PROCEDURE — 85007 BL SMEAR W/DIFF WBC COUNT: CPT

## 2017-01-01 PROCEDURE — 700111 HCHG RX REV CODE 636 W/ 250 OVERRIDE (IP): Performed by: INTERNAL MEDICINE

## 2017-01-01 PROCEDURE — 80053 COMPREHEN METABOLIC PANEL: CPT

## 2017-01-01 PROCEDURE — 36415 COLL VENOUS BLD VENIPUNCTURE: CPT

## 2017-01-01 PROCEDURE — 85025 COMPLETE CBC W/AUTO DIFF WBC: CPT

## 2017-01-01 PROCEDURE — 700111 HCHG RX REV CODE 636 W/ 250 OVERRIDE (IP)

## 2017-01-01 PROCEDURE — 82550 ASSAY OF CK (CPK): CPT

## 2017-01-01 PROCEDURE — A9270 NON-COVERED ITEM OR SERVICE: HCPCS | Performed by: INTERNAL MEDICINE

## 2017-01-01 PROCEDURE — 302978 HCHG BRONCHOSCOPY-DIAGNOSTIC

## 2017-01-01 PROCEDURE — 770022 HCHG ROOM/CARE - ICU (200)

## 2017-01-01 PROCEDURE — 770006 HCHG ROOM/CARE - MED/SURG/GYN SEMI*

## 2017-01-01 PROCEDURE — 700102 HCHG RX REV CODE 250 W/ 637 OVERRIDE(OP): Performed by: PHARMACIST

## 2017-01-01 PROCEDURE — 700111 HCHG RX REV CODE 636 W/ 250 OVERRIDE (IP): Performed by: PHARMACIST

## 2017-01-01 PROCEDURE — 94640 AIRWAY INHALATION TREATMENT: CPT

## 2017-01-01 PROCEDURE — 71010 DX-CHEST-PORTABLE (1 VIEW): CPT

## 2017-01-01 PROCEDURE — 88112 CYTOPATH CELL ENHANCE TECH: CPT

## 2017-01-01 PROCEDURE — 93005 ELECTROCARDIOGRAM TRACING: CPT | Performed by: FAMILY MEDICINE

## 2017-01-01 PROCEDURE — 700101 HCHG RX REV CODE 250: Performed by: INTERNAL MEDICINE

## 2017-01-01 PROCEDURE — 85027 COMPLETE CBC AUTOMATED: CPT

## 2017-01-01 PROCEDURE — B548ZZA ULTRASONOGRAPHY OF SUPERIOR VENA CAVA, GUIDANCE: ICD-10-PCS | Performed by: INTERNAL MEDICINE

## 2017-01-01 PROCEDURE — 700112 HCHG RX REV CODE 229: Performed by: FAMILY MEDICINE

## 2017-01-01 PROCEDURE — 94002 VENT MGMT INPAT INIT DAY: CPT

## 2017-01-01 PROCEDURE — 87205 SMEAR GRAM STAIN: CPT

## 2017-01-01 PROCEDURE — 83935 ASSAY OF URINE OSMOLALITY: CPT | Mod: 91

## 2017-01-01 PROCEDURE — 700111 HCHG RX REV CODE 636 W/ 250 OVERRIDE (IP): Performed by: FAMILY MEDICINE

## 2017-01-01 PROCEDURE — 84295 ASSAY OF SERUM SODIUM: CPT

## 2017-01-01 PROCEDURE — A9270 NON-COVERED ITEM OR SERVICE: HCPCS | Performed by: PHARMACIST

## 2017-01-01 PROCEDURE — 700105 HCHG RX REV CODE 258: Performed by: FAMILY MEDICINE

## 2017-01-01 PROCEDURE — 700101 HCHG RX REV CODE 250: Performed by: FAMILY MEDICINE

## 2017-01-01 PROCEDURE — 87186 SC STD MICRODIL/AGAR DIL: CPT

## 2017-01-01 PROCEDURE — 82962 GLUCOSE BLOOD TEST: CPT | Mod: 91

## 2017-01-01 PROCEDURE — 70450 CT HEAD/BRAIN W/O DYE: CPT

## 2017-01-01 PROCEDURE — 86140 C-REACTIVE PROTEIN: CPT

## 2017-01-01 PROCEDURE — 87070 CULTURE OTHR SPECIMN AEROBIC: CPT

## 2017-01-01 PROCEDURE — 82330 ASSAY OF CALCIUM: CPT

## 2017-01-01 PROCEDURE — 94150 VITAL CAPACITY TEST: CPT

## 2017-01-01 PROCEDURE — 71010 DX-CHEST-LIMITED (1 VIEW): CPT

## 2017-01-01 PROCEDURE — 700101 HCHG RX REV CODE 250

## 2017-01-01 PROCEDURE — HZ2ZZZZ DETOXIFICATION SERVICES FOR SUBSTANCE ABUSE TREATMENT: ICD-10-PCS | Performed by: FAMILY MEDICINE

## 2017-01-01 PROCEDURE — 88342 IMHCHEM/IMCYTCHM 1ST ANTB: CPT

## 2017-01-01 PROCEDURE — 84132 ASSAY OF SERUM POTASSIUM: CPT

## 2017-01-01 PROCEDURE — 84478 ASSAY OF TRIGLYCERIDES: CPT

## 2017-01-01 PROCEDURE — 0BH18EZ INSERTION OF ENDOTRACHEAL AIRWAY INTO TRACHEA, VIA NATURAL OR ARTIFICIAL OPENING ENDOSCOPIC: ICD-10-PCS | Performed by: INTERNAL MEDICINE

## 2017-01-01 PROCEDURE — 02HV33Z INSERTION OF INFUSION DEVICE INTO SUPERIOR VENA CAVA, PERCUTANEOUS APPROACH: ICD-10-PCS | Performed by: INTERNAL MEDICINE

## 2017-01-01 PROCEDURE — 83930 ASSAY OF BLOOD OSMOLALITY: CPT

## 2017-01-01 PROCEDURE — 85014 HEMATOCRIT: CPT

## 2017-01-01 PROCEDURE — 5A1945Z RESPIRATORY VENTILATION, 24-96 CONSECUTIVE HOURS: ICD-10-PCS | Performed by: INTERNAL MEDICINE

## 2017-01-01 PROCEDURE — 83605 ASSAY OF LACTIC ACID: CPT

## 2017-01-01 PROCEDURE — 94003 VENT MGMT INPAT SUBQ DAY: CPT

## 2017-01-01 PROCEDURE — 83690 ASSAY OF LIPASE: CPT

## 2017-01-01 PROCEDURE — 82140 ASSAY OF AMMONIA: CPT

## 2017-01-01 PROCEDURE — 770001 HCHG ROOM/CARE - MED/SURG/GYN PRIV*

## 2017-01-01 PROCEDURE — 85027 COMPLETE CBC AUTOMATED: CPT | Mod: 91

## 2017-01-01 PROCEDURE — A9579 GAD-BASE MR CONTRAST NOS,1ML: HCPCS | Performed by: FAMILY MEDICINE

## 2017-01-01 PROCEDURE — 81001 URINALYSIS AUTO W/SCOPE: CPT

## 2017-01-01 PROCEDURE — 84156 ASSAY OF PROTEIN URINE: CPT

## 2017-01-01 PROCEDURE — 70553 MRI BRAIN STEM W/O & W/DYE: CPT

## 2017-01-01 PROCEDURE — 99152 MOD SED SAME PHYS/QHP 5/>YRS: CPT

## 2017-01-01 PROCEDURE — 82570 ASSAY OF URINE CREATININE: CPT

## 2017-01-01 PROCEDURE — 93010 ELECTROCARDIOGRAM REPORT: CPT | Performed by: INTERNAL MEDICINE

## 2017-01-01 PROCEDURE — 84134 ASSAY OF PREALBUMIN: CPT

## 2017-01-01 PROCEDURE — 88341 IMHCHEM/IMCYTCHM EA ADD ANTB: CPT | Mod: 91

## 2017-01-01 PROCEDURE — 84300 ASSAY OF URINE SODIUM: CPT | Mod: 91

## 2017-01-01 PROCEDURE — 82436 ASSAY OF URINE CHLORIDE: CPT

## 2017-01-01 PROCEDURE — 88305 TISSUE EXAM BY PATHOLOGIST: CPT

## 2017-01-01 PROCEDURE — 80048 BASIC METABOLIC PNL TOTAL CA: CPT | Mod: 91

## 2017-01-01 PROCEDURE — 85610 PROTHROMBIN TIME: CPT

## 2017-01-01 PROCEDURE — 87077 CULTURE AEROBIC IDENTIFY: CPT

## 2017-01-01 RX ORDER — MAGNESIUM SULFATE HEPTAHYDRATE 40 MG/ML
4 INJECTION, SOLUTION INTRAVENOUS ONCE
Status: COMPLETED | OUTPATIENT
Start: 2017-01-01 | End: 2017-01-01

## 2017-01-01 RX ORDER — LORAZEPAM 2 MG/ML
1.5 INJECTION INTRAMUSCULAR
Status: DISCONTINUED | OUTPATIENT
Start: 2017-01-01 | End: 2017-01-01

## 2017-01-01 RX ORDER — LORAZEPAM 2 MG/ML
2 INJECTION INTRAMUSCULAR
Status: DISCONTINUED | OUTPATIENT
Start: 2017-01-01 | End: 2017-01-01

## 2017-01-01 RX ORDER — POTASSIUM CHLORIDE 20 MEQ/1
40 TABLET, EXTENDED RELEASE ORAL
Status: COMPLETED | OUTPATIENT
Start: 2017-01-01 | End: 2017-01-01

## 2017-01-01 RX ORDER — LORAZEPAM 0.5 MG/1
.5-4 TABLET ORAL
Status: DISCONTINUED | OUTPATIENT
Start: 2017-01-01 | End: 2017-01-01

## 2017-01-01 RX ORDER — SODIUM CHLORIDE AND POTASSIUM CHLORIDE 150; 450 MG/100ML; MG/100ML
INJECTION, SOLUTION INTRAVENOUS CONTINUOUS
Status: DISCONTINUED | OUTPATIENT
Start: 2017-01-01 | End: 2017-01-01

## 2017-01-01 RX ORDER — SODIUM CHLORIDE 9 MG/ML
1000 INJECTION, SOLUTION INTRAVENOUS ONCE
Status: COMPLETED | OUTPATIENT
Start: 2017-01-01 | End: 2017-01-01

## 2017-01-01 RX ORDER — LORAZEPAM 2 MG/ML
0.5 INJECTION INTRAMUSCULAR EVERY 4 HOURS PRN
Status: DISCONTINUED | OUTPATIENT
Start: 2017-01-01 | End: 2017-01-01

## 2017-01-01 RX ORDER — SCOLOPAMINE TRANSDERMAL SYSTEM 1 MG/1
1 PATCH, EXTENDED RELEASE TRANSDERMAL
Status: DISCONTINUED | OUTPATIENT
Start: 2017-01-01 | End: 2017-01-01 | Stop reason: HOSPADM

## 2017-01-01 RX ORDER — LORAZEPAM 2 MG/ML
1 INJECTION INTRAMUSCULAR
Status: DISCONTINUED | OUTPATIENT
Start: 2017-01-01 | End: 2017-01-01

## 2017-01-01 RX ORDER — LORAZEPAM 2 MG/ML
1-2 INJECTION INTRAMUSCULAR EVERY 4 HOURS PRN
Status: DISCONTINUED | OUTPATIENT
Start: 2017-01-01 | End: 2017-01-01 | Stop reason: HOSPADM

## 2017-01-01 RX ORDER — HYDROCODONE BITARTRATE AND ACETAMINOPHEN 10; 325 MG/1; MG/1
.5-1 TABLET ORAL EVERY 4 HOURS PRN
COMMUNITY

## 2017-01-01 RX ORDER — IPRATROPIUM BROMIDE AND ALBUTEROL SULFATE 2.5; .5 MG/3ML; MG/3ML
3 SOLUTION RESPIRATORY (INHALATION)
Status: DISCONTINUED | OUTPATIENT
Start: 2017-01-01 | End: 2017-01-01

## 2017-01-01 RX ORDER — IPRATROPIUM BROMIDE AND ALBUTEROL SULFATE 2.5; .5 MG/3ML; MG/3ML
3 SOLUTION RESPIRATORY (INHALATION) 4 TIMES DAILY
Status: DISCONTINUED | OUTPATIENT
Start: 2017-01-01 | End: 2017-01-01

## 2017-01-01 RX ORDER — POLYETHYLENE GLYCOL 3350 17 G/17G
1 POWDER, FOR SOLUTION ORAL
Status: DISCONTINUED | OUTPATIENT
Start: 2017-01-01 | End: 2017-01-01

## 2017-01-01 RX ORDER — QUETIAPINE FUMARATE 25 MG/1
50 TABLET, FILM COATED ORAL 3 TIMES DAILY
Status: DISCONTINUED | OUTPATIENT
Start: 2017-01-01 | End: 2017-01-01

## 2017-01-01 RX ORDER — NICOTINE 21 MG/24HR
21 PATCH, TRANSDERMAL 24 HOURS TRANSDERMAL
Status: DISCONTINUED | OUTPATIENT
Start: 2017-01-01 | End: 2017-01-01

## 2017-01-01 RX ORDER — LORAZEPAM 1 MG/1
4 TABLET ORAL
Status: DISCONTINUED | OUTPATIENT
Start: 2017-01-01 | End: 2017-01-01

## 2017-01-01 RX ORDER — SODIUM CHLORIDE, SODIUM LACTATE, POTASSIUM CHLORIDE, CALCIUM CHLORIDE 600; 310; 30; 20 MG/100ML; MG/100ML; MG/100ML; MG/100ML
INJECTION, SOLUTION INTRAVENOUS CONTINUOUS
Status: DISCONTINUED | OUTPATIENT
Start: 2017-01-01 | End: 2017-01-01

## 2017-01-01 RX ORDER — PHENOBARBITAL SODIUM 130 MG/ML
130 INJECTION INTRAMUSCULAR; INTRAVENOUS
Status: DISCONTINUED | OUTPATIENT
Start: 2017-01-01 | End: 2017-01-01

## 2017-01-01 RX ORDER — ONDANSETRON 2 MG/ML
4 INJECTION INTRAMUSCULAR; INTRAVENOUS EVERY 4 HOURS PRN
Status: DISCONTINUED | OUTPATIENT
Start: 2017-01-01 | End: 2017-01-01

## 2017-01-01 RX ORDER — AMOXICILLIN 250 MG
2 CAPSULE ORAL 2 TIMES DAILY
Status: DISCONTINUED | OUTPATIENT
Start: 2017-01-01 | End: 2017-01-01

## 2017-01-01 RX ORDER — MAGNESIUM SULFATE HEPTAHYDRATE 40 MG/ML
2 INJECTION, SOLUTION INTRAVENOUS ONCE
Status: COMPLETED | OUTPATIENT
Start: 2017-01-01 | End: 2017-01-01

## 2017-01-01 RX ORDER — LORAZEPAM 0.5 MG/1
4 TABLET ORAL
Status: DISCONTINUED | OUTPATIENT
Start: 2017-01-01 | End: 2017-01-01

## 2017-01-01 RX ORDER — CHLORHEXIDINE GLUCONATE ORAL RINSE 1.2 MG/ML
15 SOLUTION DENTAL 2 TIMES DAILY
Status: DISCONTINUED | OUTPATIENT
Start: 2017-01-01 | End: 2017-01-01

## 2017-01-01 RX ORDER — POTASSIUM CHLORIDE 20 MEQ/1
20 TABLET, EXTENDED RELEASE ORAL 3 TIMES DAILY
Status: COMPLETED | OUTPATIENT
Start: 2017-01-01 | End: 2017-01-01

## 2017-01-01 RX ORDER — PROMETHAZINE HYDROCHLORIDE 12.5 MG/1
12.5-25 SUPPOSITORY RECTAL EVERY 4 HOURS PRN
Status: DISCONTINUED | OUTPATIENT
Start: 2017-01-01 | End: 2017-01-01

## 2017-01-01 RX ORDER — LORAZEPAM 0.5 MG/1
1 TABLET ORAL EVERY 4 HOURS PRN
Status: DISCONTINUED | OUTPATIENT
Start: 2017-01-01 | End: 2017-01-01

## 2017-01-01 RX ORDER — HEPARIN SODIUM 5000 [USP'U]/ML
5000 INJECTION, SOLUTION INTRAVENOUS; SUBCUTANEOUS EVERY 8 HOURS
Status: DISCONTINUED | OUTPATIENT
Start: 2017-01-01 | End: 2017-01-01

## 2017-01-01 RX ORDER — LORAZEPAM 0.5 MG/1
2 TABLET ORAL
Status: DISCONTINUED | OUTPATIENT
Start: 2017-01-01 | End: 2017-01-01

## 2017-01-01 RX ORDER — SODIUM CHLORIDE AND POTASSIUM CHLORIDE 150; 900 MG/100ML; MG/100ML
INJECTION, SOLUTION INTRAVENOUS CONTINUOUS
Status: DISCONTINUED | OUTPATIENT
Start: 2017-01-01 | End: 2017-01-01

## 2017-01-01 RX ORDER — HYDROCODONE BITARTRATE AND ACETAMINOPHEN 10; 325 MG/1; MG/1
1 TABLET ORAL EVERY 6 HOURS PRN
Status: DISCONTINUED | OUTPATIENT
Start: 2017-01-01 | End: 2017-01-01

## 2017-01-01 RX ORDER — LEVALBUTEROL INHALATION SOLUTION 1.25 MG/3ML
1.25 SOLUTION RESPIRATORY (INHALATION)
Status: DISCONTINUED | OUTPATIENT
Start: 2017-01-01 | End: 2017-01-01

## 2017-01-01 RX ORDER — OXYCODONE HYDROCHLORIDE 10 MG/1
10 TABLET ORAL EVERY 6 HOURS
Status: DISCONTINUED | OUTPATIENT
Start: 2017-01-01 | End: 2017-01-01

## 2017-01-01 RX ORDER — MIDAZOLAM HYDROCHLORIDE 1 MG/ML
INJECTION INTRAMUSCULAR; INTRAVENOUS
Status: COMPLETED
Start: 2017-01-01 | End: 2017-01-01

## 2017-01-01 RX ORDER — POTASSIUM CHLORIDE 29.8 MG/ML
40 INJECTION INTRAVENOUS ONCE
Status: COMPLETED | OUTPATIENT
Start: 2017-01-01 | End: 2017-01-01

## 2017-01-01 RX ORDER — LORAZEPAM 2 MG/ML
5 INJECTION INTRAMUSCULAR EVERY 4 HOURS PRN
Status: DISCONTINUED | OUTPATIENT
Start: 2017-01-01 | End: 2017-01-01 | Stop reason: HOSPADM

## 2017-01-01 RX ORDER — PROMETHAZINE HYDROCHLORIDE 25 MG/1
12.5-25 TABLET ORAL EVERY 4 HOURS PRN
Status: DISCONTINUED | OUTPATIENT
Start: 2017-01-01 | End: 2017-01-01

## 2017-01-01 RX ORDER — LEVALBUTEROL INHALATION SOLUTION 1.25 MG/3ML
1.25 SOLUTION RESPIRATORY (INHALATION) 4 TIMES DAILY
Status: DISCONTINUED | OUTPATIENT
Start: 2017-01-01 | End: 2017-01-01

## 2017-01-01 RX ORDER — LORAZEPAM 0.5 MG/1
0.5 TABLET ORAL EVERY 4 HOURS PRN
Status: DISCONTINUED | OUTPATIENT
Start: 2017-01-01 | End: 2017-01-01

## 2017-01-01 RX ORDER — PROPOFOL 10 MG/ML
50 INJECTION, EMULSION INTRAVENOUS ONCE
Status: COMPLETED | OUTPATIENT
Start: 2017-01-01 | End: 2017-01-01

## 2017-01-01 RX ORDER — LORAZEPAM 1 MG/1
1 TABLET ORAL EVERY 4 HOURS PRN
Status: DISCONTINUED | OUTPATIENT
Start: 2017-01-01 | End: 2017-01-01

## 2017-01-01 RX ORDER — BISACODYL 10 MG
10 SUPPOSITORY, RECTAL RECTAL
Status: DISCONTINUED | OUTPATIENT
Start: 2017-01-01 | End: 2017-01-01

## 2017-01-01 RX ORDER — FOLIC ACID 1 MG/1
1 TABLET ORAL DAILY
Status: COMPLETED | OUTPATIENT
Start: 2017-01-01 | End: 2017-01-01

## 2017-01-01 RX ORDER — LORAZEPAM 1 MG/1
0.5 TABLET ORAL EVERY 4 HOURS PRN
Status: DISCONTINUED | OUTPATIENT
Start: 2017-01-01 | End: 2017-01-01

## 2017-01-01 RX ORDER — DESMOPRESSIN ACETATE 4 UG/ML
2 INJECTION, SOLUTION INTRAVENOUS; SUBCUTANEOUS EVERY 12 HOURS
Status: DISCONTINUED | OUTPATIENT
Start: 2017-01-01 | End: 2017-01-01

## 2017-01-01 RX ORDER — SIMVASTATIN 40 MG
40 TABLET ORAL NIGHTLY
COMMUNITY

## 2017-01-01 RX ORDER — LORAZEPAM 0.5 MG/1
.5-1 TABLET ORAL EVERY 4 HOURS PRN
Status: DISCONTINUED | OUTPATIENT
Start: 2017-01-01 | End: 2017-01-01

## 2017-01-01 RX ORDER — ZOLPIDEM TARTRATE 5 MG/1
5 TABLET ORAL
Status: DISCONTINUED | OUTPATIENT
Start: 2017-01-01 | End: 2017-01-01

## 2017-01-01 RX ORDER — MIDAZOLAM HYDROCHLORIDE 1 MG/ML
2 INJECTION INTRAMUSCULAR; INTRAVENOUS ONCE
Status: COMPLETED | OUTPATIENT
Start: 2017-01-01 | End: 2017-01-01

## 2017-01-01 RX ORDER — LISINOPRIL AND HYDROCHLOROTHIAZIDE 20; 12.5 MG/1; MG/1
1 TABLET ORAL DAILY
COMMUNITY

## 2017-01-01 RX ORDER — IPRATROPIUM BROMIDE AND ALBUTEROL SULFATE 2.5; .5 MG/3ML; MG/3ML
3 SOLUTION RESPIRATORY (INHALATION)
Status: DISCONTINUED | OUTPATIENT
Start: 2017-01-01 | End: 2017-01-01 | Stop reason: SINTOL

## 2017-01-01 RX ORDER — SODIUM CHLORIDE 9 MG/ML
500 INJECTION, SOLUTION INTRAVENOUS ONCE
Status: COMPLETED | OUTPATIENT
Start: 2017-01-01 | End: 2017-01-01

## 2017-01-01 RX ORDER — LORAZEPAM 1 MG/1
2 TABLET ORAL EVERY 4 HOURS PRN
Status: DISCONTINUED | OUTPATIENT
Start: 2017-01-01 | End: 2017-01-01

## 2017-01-01 RX ORDER — LORAZEPAM 2 MG/ML
2 INJECTION INTRAMUSCULAR EVERY 6 HOURS PRN
Status: DISCONTINUED | OUTPATIENT
Start: 2017-01-01 | End: 2017-01-01

## 2017-01-01 RX ORDER — LORAZEPAM 1 MG/1
3 TABLET ORAL EVERY 4 HOURS PRN
Status: DISCONTINUED | OUTPATIENT
Start: 2017-01-01 | End: 2017-01-01

## 2017-01-01 RX ORDER — THIAMINE MONONITRATE (VIT B1) 100 MG
100 TABLET ORAL DAILY
Status: COMPLETED | OUTPATIENT
Start: 2017-01-01 | End: 2017-01-01

## 2017-01-01 RX ORDER — ONDANSETRON 4 MG/1
4 TABLET, ORALLY DISINTEGRATING ORAL EVERY 4 HOURS PRN
Status: DISCONTINUED | OUTPATIENT
Start: 2017-01-01 | End: 2017-01-01

## 2017-01-01 RX ORDER — ACETAMINOPHEN 325 MG/1
650 TABLET ORAL EVERY 6 HOURS PRN
Status: DISCONTINUED | OUTPATIENT
Start: 2017-01-01 | End: 2017-01-01

## 2017-01-01 RX ORDER — PHENOBARBITAL SODIUM 130 MG/ML
260 INJECTION INTRAMUSCULAR; INTRAVENOUS
Status: DISCONTINUED | OUTPATIENT
Start: 2017-01-01 | End: 2017-01-01

## 2017-01-01 RX ORDER — HYDROCODONE BITARTRATE AND ACETAMINOPHEN 10; 325 MG/1; MG/1
1 TABLET ORAL
Status: DISCONTINUED | OUTPATIENT
Start: 2017-01-01 | End: 2017-01-01

## 2017-01-01 RX ORDER — LORAZEPAM 1 MG/1
4 TABLET ORAL EVERY 4 HOURS PRN
Status: DISCONTINUED | OUTPATIENT
Start: 2017-01-01 | End: 2017-01-01

## 2017-01-01 RX ORDER — NOREPINEPHRINE BITARTRATE 1 MG/ML
INJECTION, SOLUTION INTRAVENOUS
Status: ACTIVE
Start: 2017-01-01 | End: 2017-01-01

## 2017-01-01 RX ORDER — FAMOTIDINE 20 MG/1
20 TABLET, FILM COATED ORAL EVERY 12 HOURS
Status: DISCONTINUED | OUTPATIENT
Start: 2017-01-01 | End: 2017-01-01

## 2017-01-01 RX ORDER — HALOPERIDOL 5 MG/ML
5 INJECTION INTRAMUSCULAR EVERY 4 HOURS PRN
Status: DISCONTINUED | OUTPATIENT
Start: 2017-01-01 | End: 2017-01-01

## 2017-01-01 RX ORDER — LORAZEPAM 1 MG/1
3 TABLET ORAL
Status: DISCONTINUED | OUTPATIENT
Start: 2017-01-01 | End: 2017-01-01

## 2017-01-01 RX ORDER — DESMOPRESSIN ACETATE 4 UG/ML
2 INJECTION, SOLUTION INTRAVENOUS; SUBCUTANEOUS 2 TIMES DAILY
Status: DISCONTINUED | OUTPATIENT
Start: 2017-01-01 | End: 2017-01-01

## 2017-01-01 RX ORDER — DEXTROSE MONOHYDRATE 50 MG/ML
INJECTION, SOLUTION INTRAVENOUS CONTINUOUS
Status: DISCONTINUED | OUTPATIENT
Start: 2017-01-01 | End: 2017-01-01

## 2017-01-01 RX ORDER — LORAZEPAM 0.5 MG/1
3 TABLET ORAL
Status: DISCONTINUED | OUTPATIENT
Start: 2017-01-01 | End: 2017-01-01

## 2017-01-01 RX ORDER — DESMOPRESSIN ACETATE 4 UG/ML
2 INJECTION, SOLUTION INTRAVENOUS; SUBCUTANEOUS ONCE
Status: COMPLETED | OUTPATIENT
Start: 2017-01-01 | End: 2017-01-01

## 2017-01-01 RX ORDER — MORPHINE SULFATE 10 MG/ML
5-10 INJECTION, SOLUTION INTRAMUSCULAR; INTRAVENOUS
Status: DISCONTINUED | OUTPATIENT
Start: 2017-01-01 | End: 2017-01-01 | Stop reason: HOSPADM

## 2017-01-01 RX ORDER — LACTULOSE 20 G/30ML
30 SOLUTION ORAL 2 TIMES DAILY
Status: DISCONTINUED | OUTPATIENT
Start: 2017-01-01 | End: 2017-01-01

## 2017-01-01 RX ORDER — LORAZEPAM 1 MG/1
2 TABLET ORAL
Status: DISCONTINUED | OUTPATIENT
Start: 2017-01-01 | End: 2017-01-01

## 2017-01-01 RX ORDER — LEVALBUTEROL INHALATION SOLUTION 1.25 MG/3ML
1.25 SOLUTION RESPIRATORY (INHALATION)
Status: DISCONTINUED | OUTPATIENT
Start: 2017-01-01 | End: 2017-01-01 | Stop reason: CLARIF

## 2017-01-01 RX ORDER — THIAMINE MONONITRATE (VIT B1) 100 MG
100 TABLET ORAL DAILY
Status: DISCONTINUED | OUTPATIENT
Start: 2017-01-01 | End: 2017-01-01

## 2017-01-01 RX ORDER — LIDOCAINE HYDROCHLORIDE 10 MG/ML
1-2 INJECTION, SOLUTION INFILTRATION; PERINEURAL
Status: DISCONTINUED | OUTPATIENT
Start: 2017-01-01 | End: 2017-01-01

## 2017-01-01 RX ORDER — LORAZEPAM 2 MG/ML
3-4 INJECTION INTRAMUSCULAR EVERY 4 HOURS PRN
Status: DISCONTINUED | OUTPATIENT
Start: 2017-01-01 | End: 2017-01-01 | Stop reason: HOSPADM

## 2017-01-01 RX ORDER — ROCURONIUM BROMIDE 10 MG/ML
50 INJECTION, SOLUTION INTRAVENOUS ONCE
Status: COMPLETED | OUTPATIENT
Start: 2017-01-01 | End: 2017-01-01

## 2017-01-01 RX ADMIN — OXYCODONE HYDROCHLORIDE 10 MG: 10 TABLET ORAL at 18:56

## 2017-01-01 RX ADMIN — DIBASIC SODIUM PHOSPHATE, MONOBASIC POTASSIUM PHOSPHATE AND MONOBASIC SODIUM PHOSPHATE 1 TABLET: 852; 155; 130 TABLET ORAL at 11:56

## 2017-01-01 RX ADMIN — DIBASIC SODIUM PHOSPHATE, MONOBASIC POTASSIUM PHOSPHATE AND MONOBASIC SODIUM PHOSPHATE 1 TABLET: 852; 155; 130 TABLET ORAL at 05:10

## 2017-01-01 RX ADMIN — GUAIFENESIN 200 MG: 100 SOLUTION ORAL at 15:55

## 2017-01-01 RX ADMIN — OXYCODONE HYDROCHLORIDE 10 MG: 10 TABLET ORAL at 12:14

## 2017-01-01 RX ADMIN — DESMOPRESSIN ACETATE 2 MCG: 4 INJECTION, SOLUTION INTRAVENOUS; SUBCUTANEOUS at 20:31

## 2017-01-01 RX ADMIN — LEVALBUTEROL 1.25 MG: 1.25 SOLUTION RESPIRATORY (INHALATION) at 08:59

## 2017-01-01 RX ADMIN — CALCITONIN SALMON 280 UNITS: 200 INJECTION, SOLUTION INTRAMUSCULAR; SUBCUTANEOUS at 05:25

## 2017-01-01 RX ADMIN — GUAIFENESIN 200 MG: 100 SOLUTION ORAL at 09:03

## 2017-01-01 RX ADMIN — POTASSIUM PHOSPHATE, MONOBASIC AND POTASSIUM PHOSPHATE, DIBASIC 30 MMOL: 224; 236 INJECTION, SOLUTION INTRAVENOUS at 07:31

## 2017-01-01 RX ADMIN — OXYCODONE HYDROCHLORIDE 10 MG: 10 TABLET ORAL at 11:56

## 2017-01-01 RX ADMIN — GUAIFENESIN 200 MG: 100 SOLUTION ORAL at 16:28

## 2017-01-01 RX ADMIN — IPRATROPIUM BROMIDE AND ALBUTEROL SULFATE 3 ML: .5; 3 SOLUTION RESPIRATORY (INHALATION) at 17:23

## 2017-01-01 RX ADMIN — THERA TABS 1 TABLET: TAB at 10:09

## 2017-01-01 RX ADMIN — ACETAMINOPHEN 650 MG: 325 TABLET, FILM COATED ORAL at 10:12

## 2017-01-01 RX ADMIN — AMPICILLIN SODIUM AND SULBACTAM SODIUM 3 G: 2; 1 INJECTION, POWDER, FOR SOLUTION INTRAMUSCULAR; INTRAVENOUS at 23:26

## 2017-01-01 RX ADMIN — AMPICILLIN SODIUM AND SULBACTAM SODIUM 3 G: 2; 1 INJECTION, POWDER, FOR SOLUTION INTRAMUSCULAR; INTRAVENOUS at 05:32

## 2017-01-01 RX ADMIN — GUAIFENESIN 200 MG: 100 SOLUTION ORAL at 03:59

## 2017-01-01 RX ADMIN — DIBASIC SODIUM PHOSPHATE, MONOBASIC POTASSIUM PHOSPHATE AND MONOBASIC SODIUM PHOSPHATE 1 TABLET: 852; 155; 130 TABLET ORAL at 23:47

## 2017-01-01 RX ADMIN — DESMOPRESSIN ACETATE 2 MCG: 4 INJECTION, SOLUTION INTRAVENOUS; SUBCUTANEOUS at 09:00

## 2017-01-01 RX ADMIN — MAGNESIUM SULFATE IN WATER 4 G: 40 INJECTION, SOLUTION INTRAVENOUS at 09:11

## 2017-01-01 RX ADMIN — LEVALBUTEROL 1.25 MG: 1.25 SOLUTION RESPIRATORY (INHALATION) at 15:16

## 2017-01-01 RX ADMIN — PROPOFOL 50 MG: 10 INJECTION, EMULSION INTRAVENOUS at 15:46

## 2017-01-01 RX ADMIN — PHENOBARBITAL SODIUM 130 MG: 130 INJECTION INTRAMUSCULAR; INTRAVENOUS at 21:10

## 2017-01-01 RX ADMIN — GUAIFENESIN 200 MG: 100 SOLUTION ORAL at 10:09

## 2017-01-01 RX ADMIN — STANDARDIZED SENNA CONCENTRATE AND DOCUSATE SODIUM 2 TABLET: 8.6; 5 TABLET, FILM COATED ORAL at 09:50

## 2017-01-01 RX ADMIN — DEXTROSE MONOHYDRATE: 50 INJECTION, SOLUTION INTRAVENOUS at 14:17

## 2017-01-01 RX ADMIN — FENTANYL CITRATE 50 MCG: 50 INJECTION, SOLUTION INTRAMUSCULAR; INTRAVENOUS at 17:30

## 2017-01-01 RX ADMIN — AMPICILLIN SODIUM AND SULBACTAM SODIUM 3 G: 2; 1 INJECTION, POWDER, FOR SOLUTION INTRAMUSCULAR; INTRAVENOUS at 23:52

## 2017-01-01 RX ADMIN — GUAIFENESIN 200 MG: 100 SOLUTION ORAL at 10:47

## 2017-01-01 RX ADMIN — LEVALBUTEROL 1.25 MG: 1.25 SOLUTION RESPIRATORY (INHALATION) at 15:14

## 2017-01-01 RX ADMIN — AMPICILLIN SODIUM AND SULBACTAM SODIUM 3 G: 2; 1 INJECTION, POWDER, FOR SOLUTION INTRAMUSCULAR; INTRAVENOUS at 00:09

## 2017-01-01 RX ADMIN — IPRATROPIUM BROMIDE AND ALBUTEROL SULFATE 3 ML: .5; 3 SOLUTION RESPIRATORY (INHALATION) at 06:20

## 2017-01-01 RX ADMIN — AMPICILLIN SODIUM AND SULBACTAM SODIUM 3 G: 2; 1 INJECTION, POWDER, FOR SOLUTION INTRAMUSCULAR; INTRAVENOUS at 11:34

## 2017-01-01 RX ADMIN — HYDROCODONE BITARTRATE AND ACETAMINOPHEN 1 TABLET: 10; 325 TABLET ORAL at 12:00

## 2017-01-01 RX ADMIN — HYDROCODONE BITARTRATE AND ACETAMINOPHEN 1 TABLET: 10; 325 TABLET ORAL at 15:10

## 2017-01-01 RX ADMIN — IPRATROPIUM BROMIDE AND ALBUTEROL SULFATE 3 ML: .5; 3 SOLUTION RESPIRATORY (INHALATION) at 06:27

## 2017-01-01 RX ADMIN — FAMOTIDINE 20 MG: 20 TABLET, FILM COATED ORAL at 09:03

## 2017-01-01 RX ADMIN — NICOTINE 21 MG: 21 PATCH, EXTENDED RELEASE TRANSDERMAL at 05:25

## 2017-01-01 RX ADMIN — DIBASIC SODIUM PHOSPHATE, MONOBASIC POTASSIUM PHOSPHATE AND MONOBASIC SODIUM PHOSPHATE 1 TABLET: 852; 155; 130 TABLET ORAL at 23:54

## 2017-01-01 RX ADMIN — LORAZEPAM 0.5 MG: 2 INJECTION INTRAMUSCULAR; INTRAVENOUS at 14:38

## 2017-01-01 RX ADMIN — QUETIAPINE FUMARATE 50 MG: 25 TABLET ORAL at 09:03

## 2017-01-01 RX ADMIN — STANDARDIZED SENNA CONCENTRATE AND DOCUSATE SODIUM 2 TABLET: 8.6; 5 TABLET, FILM COATED ORAL at 09:03

## 2017-01-01 RX ADMIN — PAMIDRONATE DISODIUM 90 MG: 9 INJECTION, SOLUTION INTRAVENOUS at 16:25

## 2017-01-01 RX ADMIN — LEVALBUTEROL 1.25 MG: 1.25 SOLUTION RESPIRATORY (INHALATION) at 11:12

## 2017-01-01 RX ADMIN — NICOTINE 21 MG: 21 PATCH, EXTENDED RELEASE TRANSDERMAL at 05:39

## 2017-01-01 RX ADMIN — NICOTINE 21 MG: 21 PATCH, EXTENDED RELEASE TRANSDERMAL at 05:20

## 2017-01-01 RX ADMIN — AMPICILLIN SODIUM AND SULBACTAM SODIUM 3 G: 2; 1 INJECTION, POWDER, FOR SOLUTION INTRAMUSCULAR; INTRAVENOUS at 17:01

## 2017-01-01 RX ADMIN — PROPOFOL 40 MCG/KG/MIN: 10 INJECTION, EMULSION INTRAVENOUS at 10:46

## 2017-01-01 RX ADMIN — LACTULOSE 30 ML: 20 SOLUTION ORAL at 20:23

## 2017-01-01 RX ADMIN — AMPICILLIN SODIUM AND SULBACTAM SODIUM 3 G: 2; 1 INJECTION, POWDER, FOR SOLUTION INTRAMUSCULAR; INTRAVENOUS at 05:25

## 2017-01-01 RX ADMIN — AMPICILLIN SODIUM AND SULBACTAM SODIUM 3 G: 2; 1 INJECTION, POWDER, FOR SOLUTION INTRAMUSCULAR; INTRAVENOUS at 11:45

## 2017-01-01 RX ADMIN — HYDROCODONE BITARTRATE AND ACETAMINOPHEN 1 TABLET: 10; 325 TABLET ORAL at 17:00

## 2017-01-01 RX ADMIN — POTASSIUM CHLORIDE 40 MEQ: 1500 TABLET, EXTENDED RELEASE ORAL at 00:21

## 2017-01-01 RX ADMIN — AMPICILLIN SODIUM AND SULBACTAM SODIUM 3 G: 2; 1 INJECTION, POWDER, FOR SOLUTION INTRAMUSCULAR; INTRAVENOUS at 23:47

## 2017-01-01 RX ADMIN — LEVALBUTEROL 1.25 MG: 1.25 SOLUTION RESPIRATORY (INHALATION) at 18:28

## 2017-01-01 RX ADMIN — HYDROCODONE BITARTRATE AND ACETAMINOPHEN 1 TABLET: 10; 325 TABLET ORAL at 20:23

## 2017-01-01 RX ADMIN — IPRATROPIUM BROMIDE AND ALBUTEROL SULFATE 3 ML: .5; 3 SOLUTION RESPIRATORY (INHALATION) at 02:15

## 2017-01-01 RX ADMIN — OXYCODONE HYDROCHLORIDE 10 MG: 10 TABLET ORAL at 11:08

## 2017-01-01 RX ADMIN — AMPICILLIN SODIUM AND SULBACTAM SODIUM 3 G: 2; 1 INJECTION, POWDER, FOR SOLUTION INTRAMUSCULAR; INTRAVENOUS at 00:21

## 2017-01-01 RX ADMIN — THERA TABS 1 TABLET: TAB at 09:50

## 2017-01-01 RX ADMIN — ACETAMINOPHEN 650 MG: 325 TABLET, FILM COATED ORAL at 11:31

## 2017-01-01 RX ADMIN — GUAIFENESIN 200 MG: 100 SOLUTION ORAL at 20:12

## 2017-01-01 RX ADMIN — DIBASIC SODIUM PHOSPHATE, MONOBASIC POTASSIUM PHOSPHATE AND MONOBASIC SODIUM PHOSPHATE 1 TABLET: 852; 155; 130 TABLET ORAL at 00:09

## 2017-01-01 RX ADMIN — OXYCODONE HYDROCHLORIDE 10 MG: 10 TABLET ORAL at 05:10

## 2017-01-01 RX ADMIN — LORAZEPAM 0.5 MG: 0.5 TABLET ORAL at 10:45

## 2017-01-01 RX ADMIN — QUETIAPINE FUMARATE 50 MG: 25 TABLET ORAL at 15:55

## 2017-01-01 RX ADMIN — DIBASIC SODIUM PHOSPHATE, MONOBASIC POTASSIUM PHOSPHATE AND MONOBASIC SODIUM PHOSPHATE 1 TABLET: 852; 155; 130 TABLET ORAL at 17:27

## 2017-01-01 RX ADMIN — OXYCODONE HYDROCHLORIDE 10 MG: 10 TABLET ORAL at 11:35

## 2017-01-01 RX ADMIN — Medication 100 MG: at 09:59

## 2017-01-01 RX ADMIN — CALCITONIN SALMON 280 UNITS: 200 INJECTION, SOLUTION INTRAMUSCULAR; SUBCUTANEOUS at 05:39

## 2017-01-01 RX ADMIN — DIBASIC SODIUM PHOSPHATE, MONOBASIC POTASSIUM PHOSPHATE AND MONOBASIC SODIUM PHOSPHATE 1 TABLET: 852; 155; 130 TABLET ORAL at 05:20

## 2017-01-01 RX ADMIN — LEVALBUTEROL 1.25 MG: 1.25 SOLUTION RESPIRATORY (INHALATION) at 06:56

## 2017-01-01 RX ADMIN — ACETAMINOPHEN 650 MG: 325 TABLET, FILM COATED ORAL at 13:04

## 2017-01-01 RX ADMIN — POTASSIUM CHLORIDE AND SODIUM CHLORIDE: 900; 150 INJECTION, SOLUTION INTRAVENOUS at 17:20

## 2017-01-01 RX ADMIN — DEXTROSE MONOHYDRATE: 50 INJECTION, SOLUTION INTRAVENOUS at 21:46

## 2017-01-01 RX ADMIN — ENOXAPARIN SODIUM 40 MG: 100 INJECTION SUBCUTANEOUS at 09:01

## 2017-01-01 RX ADMIN — HYDROCODONE BITARTRATE AND ACETAMINOPHEN 1 TABLET: 10; 325 TABLET ORAL at 21:50

## 2017-01-01 RX ADMIN — AMPICILLIN SODIUM AND SULBACTAM SODIUM 3 G: 2; 1 INJECTION, POWDER, FOR SOLUTION INTRAMUSCULAR; INTRAVENOUS at 05:39

## 2017-01-01 RX ADMIN — DIBASIC SODIUM PHOSPHATE, MONOBASIC POTASSIUM PHOSPHATE AND MONOBASIC SODIUM PHOSPHATE 1 TABLET: 852; 155; 130 TABLET ORAL at 23:25

## 2017-01-01 RX ADMIN — LEVALBUTEROL 1.25 MG: 1.25 SOLUTION RESPIRATORY (INHALATION) at 11:53

## 2017-01-01 RX ADMIN — LEVALBUTEROL 1.25 MG: 1.25 SOLUTION RESPIRATORY (INHALATION) at 18:54

## 2017-01-01 RX ADMIN — HYDROCODONE BITARTRATE AND ACETAMINOPHEN 1 TABLET: 10; 325 TABLET ORAL at 06:01

## 2017-01-01 RX ADMIN — HYDROCODONE BITARTRATE AND ACETAMINOPHEN 1 TABLET: 10; 325 TABLET ORAL at 05:55

## 2017-01-01 RX ADMIN — DESMOPRESSIN ACETATE 2 MCG: 4 INJECTION, SOLUTION INTRAVENOUS; SUBCUTANEOUS at 19:00

## 2017-01-01 RX ADMIN — LORAZEPAM 0.5 MG: 2 INJECTION INTRAMUSCULAR; INTRAVENOUS at 02:18

## 2017-01-01 RX ADMIN — QUETIAPINE FUMARATE 50 MG: 25 TABLET ORAL at 09:00

## 2017-01-01 RX ADMIN — AMPICILLIN SODIUM AND SULBACTAM SODIUM 3 G: 2; 1 INJECTION, POWDER, FOR SOLUTION INTRAMUSCULAR; INTRAVENOUS at 01:14

## 2017-01-01 RX ADMIN — MAGNESIUM SULFATE IN WATER 2 G: 40 INJECTION, SOLUTION INTRAVENOUS at 17:09

## 2017-01-01 RX ADMIN — LORAZEPAM 2 MG: 0.5 TABLET ORAL at 17:58

## 2017-01-01 RX ADMIN — IPRATROPIUM BROMIDE AND ALBUTEROL SULFATE 3 ML: .5; 3 SOLUTION RESPIRATORY (INHALATION) at 14:05

## 2017-01-01 RX ADMIN — LEVALBUTEROL 1.25 MG: 1.25 SOLUTION RESPIRATORY (INHALATION) at 07:39

## 2017-01-01 RX ADMIN — GUAIFENESIN 200 MG: 100 SOLUTION ORAL at 03:38

## 2017-01-01 RX ADMIN — THERA TABS 1 TABLET: TAB at 09:59

## 2017-01-01 RX ADMIN — LORAZEPAM 4 MG: 2 INJECTION INTRAMUSCULAR; INTRAVENOUS at 12:38

## 2017-01-01 RX ADMIN — DESMOPRESSIN ACETATE 2 MCG: 4 INJECTION, SOLUTION INTRAVENOUS; SUBCUTANEOUS at 08:15

## 2017-01-01 RX ADMIN — AMPICILLIN SODIUM AND SULBACTAM SODIUM 3 G: 2; 1 INJECTION, POWDER, FOR SOLUTION INTRAMUSCULAR; INTRAVENOUS at 00:43

## 2017-01-01 RX ADMIN — POTASSIUM CHLORIDE 40 MEQ: 1500 TABLET, EXTENDED RELEASE ORAL at 22:22

## 2017-01-01 RX ADMIN — OXYCODONE HYDROCHLORIDE 10 MG: 10 TABLET ORAL at 05:07

## 2017-01-01 RX ADMIN — IPRATROPIUM BROMIDE AND ALBUTEROL SULFATE 3 ML: .5; 3 SOLUTION RESPIRATORY (INHALATION) at 02:00

## 2017-01-01 RX ADMIN — FAMOTIDINE 20 MG: 20 TABLET, FILM COATED ORAL at 08:17

## 2017-01-01 RX ADMIN — Medication 100 MG: at 10:09

## 2017-01-01 RX ADMIN — GUAIFENESIN 200 MG: 100 SOLUTION ORAL at 09:00

## 2017-01-01 RX ADMIN — THERA TABS 1 TABLET: TAB at 11:26

## 2017-01-01 RX ADMIN — LORAZEPAM 0.5 MG: 0.5 TABLET ORAL at 09:57

## 2017-01-01 RX ADMIN — Medication 360 ML: at 07:30

## 2017-01-01 RX ADMIN — ACETAMINOPHEN 650 MG: 325 TABLET, FILM COATED ORAL at 05:06

## 2017-01-01 RX ADMIN — POTASSIUM CHLORIDE 20 MEQ: 1500 TABLET, EXTENDED RELEASE ORAL at 14:21

## 2017-01-01 RX ADMIN — PROPOFOL 25 MCG/KG/MIN: 10 INJECTION, EMULSION INTRAVENOUS at 23:15

## 2017-01-01 RX ADMIN — FENTANYL CITRATE 100 MCG: 50 INJECTION, SOLUTION INTRAMUSCULAR; INTRAVENOUS at 05:20

## 2017-01-01 RX ADMIN — OXYCODONE HYDROCHLORIDE 10 MG: 10 TABLET ORAL at 08:39

## 2017-01-01 RX ADMIN — Medication 100 MG: at 09:50

## 2017-01-01 RX ADMIN — DIBASIC SODIUM PHOSPHATE, MONOBASIC POTASSIUM PHOSPHATE AND MONOBASIC SODIUM PHOSPHATE 1 TABLET: 852; 155; 130 TABLET ORAL at 11:35

## 2017-01-01 RX ADMIN — ACETAMINOPHEN 650 MG: 325 TABLET, FILM COATED ORAL at 11:35

## 2017-01-01 RX ADMIN — ENOXAPARIN SODIUM 40 MG: 100 INJECTION SUBCUTANEOUS at 10:09

## 2017-01-01 RX ADMIN — CHLORHEXIDINE GLUCONATE 15 ML: 1.2 RINSE ORAL at 09:03

## 2017-01-01 RX ADMIN — LORAZEPAM 4 MG: 2 INJECTION INTRAMUSCULAR; INTRAVENOUS at 17:09

## 2017-01-01 RX ADMIN — IPRATROPIUM BROMIDE AND ALBUTEROL SULFATE 3 ML: .5; 3 SOLUTION RESPIRATORY (INHALATION) at 22:03

## 2017-01-01 RX ADMIN — AMPICILLIN SODIUM AND SULBACTAM SODIUM 3 G: 2; 1 INJECTION, POWDER, FOR SOLUTION INTRAMUSCULAR; INTRAVENOUS at 17:30

## 2017-01-01 RX ADMIN — DESMOPRESSIN ACETATE 2 MCG: 4 INJECTION, SOLUTION INTRAVENOUS; SUBCUTANEOUS at 21:00

## 2017-01-01 RX ADMIN — PHENOBARBITAL SODIUM 130 MG: 130 INJECTION INTRAMUSCULAR; INTRAVENOUS at 16:48

## 2017-01-01 RX ADMIN — HEPARIN SODIUM 5000 UNITS: 5000 INJECTION, SOLUTION INTRAVENOUS; SUBCUTANEOUS at 05:25

## 2017-01-01 RX ADMIN — ENOXAPARIN SODIUM 40 MG: 100 INJECTION SUBCUTANEOUS at 09:59

## 2017-01-01 RX ADMIN — OXYCODONE HYDROCHLORIDE 10 MG: 10 TABLET ORAL at 00:09

## 2017-01-01 RX ADMIN — LEVALBUTEROL 1.25 MG: 1.25 SOLUTION RESPIRATORY (INHALATION) at 10:45

## 2017-01-01 RX ADMIN — HEPARIN SODIUM 5000 UNITS: 5000 INJECTION, SOLUTION INTRAVENOUS; SUBCUTANEOUS at 14:49

## 2017-01-01 RX ADMIN — DIBASIC SODIUM PHOSPHATE, MONOBASIC POTASSIUM PHOSPHATE AND MONOBASIC SODIUM PHOSPHATE 1 TABLET: 852; 155; 130 TABLET ORAL at 18:42

## 2017-01-01 RX ADMIN — FENTANYL CITRATE 100 MCG: 50 INJECTION, SOLUTION INTRAMUSCULAR; INTRAVENOUS at 02:34

## 2017-01-01 RX ADMIN — AMPICILLIN SODIUM AND SULBACTAM SODIUM 3 G: 2; 1 INJECTION, POWDER, FOR SOLUTION INTRAMUSCULAR; INTRAVENOUS at 18:42

## 2017-01-01 RX ADMIN — AMPICILLIN SODIUM AND SULBACTAM SODIUM 3 G: 2; 1 INJECTION, POWDER, FOR SOLUTION INTRAMUSCULAR; INTRAVENOUS at 17:27

## 2017-01-01 RX ADMIN — IPRATROPIUM BROMIDE AND ALBUTEROL SULFATE 3 ML: .5; 3 SOLUTION RESPIRATORY (INHALATION) at 22:15

## 2017-01-01 RX ADMIN — AMPICILLIN SODIUM AND SULBACTAM SODIUM 3 G: 2; 1 INJECTION, POWDER, FOR SOLUTION INTRAMUSCULAR; INTRAVENOUS at 11:26

## 2017-01-01 RX ADMIN — AMPICILLIN SODIUM AND SULBACTAM SODIUM 3 G: 2; 1 INJECTION, POWDER, FOR SOLUTION INTRAMUSCULAR; INTRAVENOUS at 11:08

## 2017-01-01 RX ADMIN — AMPICILLIN SODIUM AND SULBACTAM SODIUM 3 G: 2; 1 INJECTION, POWDER, FOR SOLUTION INTRAMUSCULAR; INTRAVENOUS at 17:10

## 2017-01-01 RX ADMIN — AMPICILLIN SODIUM AND SULBACTAM SODIUM 3 G: 2; 1 INJECTION, POWDER, FOR SOLUTION INTRAMUSCULAR; INTRAVENOUS at 13:05

## 2017-01-01 RX ADMIN — POTASSIUM CHLORIDE 40 MEQ: 1500 TABLET, EXTENDED RELEASE ORAL at 20:39

## 2017-01-01 RX ADMIN — MIDAZOLAM 2 MG: 1 INJECTION INTRAMUSCULAR; INTRAVENOUS at 15:22

## 2017-01-01 RX ADMIN — FOLIC ACID 1 MG: 1 TABLET ORAL at 10:09

## 2017-01-01 RX ADMIN — HYDROCODONE BITARTRATE AND ACETAMINOPHEN 1 TABLET: 10; 325 TABLET ORAL at 11:29

## 2017-01-01 RX ADMIN — AMPICILLIN SODIUM AND SULBACTAM SODIUM 3 G: 2; 1 INJECTION, POWDER, FOR SOLUTION INTRAMUSCULAR; INTRAVENOUS at 12:09

## 2017-01-01 RX ADMIN — CHLORHEXIDINE GLUCONATE 15 ML: 1.2 RINSE ORAL at 20:12

## 2017-01-01 RX ADMIN — FOLIC ACID: 5 INJECTION, SOLUTION INTRAMUSCULAR; INTRAVENOUS; SUBCUTANEOUS at 12:37

## 2017-01-01 RX ADMIN — POTASSIUM CHLORIDE AND SODIUM CHLORIDE: 450; 150 INJECTION, SOLUTION INTRAVENOUS at 07:43

## 2017-01-01 RX ADMIN — QUETIAPINE FUMARATE 50 MG: 25 TABLET ORAL at 20:12

## 2017-01-01 RX ADMIN — SODIUM CHLORIDE 1000 ML: 9 INJECTION, SOLUTION INTRAVENOUS at 12:00

## 2017-01-01 RX ADMIN — SODIUM CHLORIDE, POTASSIUM CHLORIDE, SODIUM LACTATE AND CALCIUM CHLORIDE: 600; 310; 30; 20 INJECTION, SOLUTION INTRAVENOUS at 09:09

## 2017-01-01 RX ADMIN — SODIUM CHLORIDE 500 ML: 9 INJECTION, SOLUTION INTRAVENOUS at 14:30

## 2017-01-01 RX ADMIN — NICOTINE 21 MG: 21 PATCH, EXTENDED RELEASE TRANSDERMAL at 11:59

## 2017-01-01 RX ADMIN — SODIUM CHLORIDE 1000 ML: 9 INJECTION, SOLUTION INTRAVENOUS at 20:15

## 2017-01-01 RX ADMIN — CALCITONIN SALMON 280 UNITS: 200 INJECTION, SOLUTION INTRAMUSCULAR; SUBCUTANEOUS at 14:49

## 2017-01-01 RX ADMIN — FOLIC ACID 1 MG: 1 TABLET ORAL at 09:59

## 2017-01-01 RX ADMIN — AMPICILLIN SODIUM AND SULBACTAM SODIUM 3 G: 2; 1 INJECTION, POWDER, FOR SOLUTION INTRAMUSCULAR; INTRAVENOUS at 18:58

## 2017-01-01 RX ADMIN — QUETIAPINE FUMARATE 50 MG: 25 TABLET ORAL at 14:21

## 2017-01-01 RX ADMIN — POTASSIUM PHOSPHATE, MONOBASIC AND POTASSIUM PHOSPHATE, DIBASIC 20 MMOL: 224; 236 INJECTION, SOLUTION INTRAVENOUS at 12:32

## 2017-01-01 RX ADMIN — CHLORHEXIDINE GLUCONATE 15 ML: 1.2 RINSE ORAL at 08:17

## 2017-01-01 RX ADMIN — NOREPINEPHRINE BITARTRATE 10 MCG/MIN: 1 INJECTION INTRAVENOUS at 06:08

## 2017-01-01 RX ADMIN — DEXTROSE MONOHYDRATE: 50 INJECTION, SOLUTION INTRAVENOUS at 17:06

## 2017-01-01 RX ADMIN — QUETIAPINE FUMARATE 50 MG: 25 TABLET ORAL at 08:17

## 2017-01-01 RX ADMIN — POTASSIUM CHLORIDE 40 MEQ: 29.8 INJECTION, SOLUTION INTRAVENOUS at 08:07

## 2017-01-01 RX ADMIN — OXYCODONE HYDROCHLORIDE 10 MG: 10 TABLET ORAL at 23:27

## 2017-01-01 RX ADMIN — PROPOFOL 40 MCG/KG/MIN: 10 INJECTION, EMULSION INTRAVENOUS at 05:09

## 2017-01-01 RX ADMIN — PHENOBARBITAL SODIUM 130 MG: 130 INJECTION INTRAMUSCULAR; INTRAVENOUS at 02:16

## 2017-01-01 RX ADMIN — POTASSIUM CHLORIDE 20 MEQ: 1500 TABLET, EXTENDED RELEASE ORAL at 09:01

## 2017-01-01 RX ADMIN — LEVALBUTEROL 1.25 MG: 1.25 SOLUTION RESPIRATORY (INHALATION) at 18:57

## 2017-01-01 RX ADMIN — ACETAMINOPHEN 650 MG: 325 TABLET, FILM COATED ORAL at 19:44

## 2017-01-01 RX ADMIN — GUAIFENESIN 200 MG: 100 SOLUTION ORAL at 20:22

## 2017-01-01 RX ADMIN — NICOTINE 21 MG: 21 PATCH, EXTENDED RELEASE TRANSDERMAL at 06:11

## 2017-01-01 RX ADMIN — LORAZEPAM 2 MG: 2 INJECTION INTRAMUSCULAR; INTRAVENOUS at 04:37

## 2017-01-01 RX ADMIN — OXYCODONE HYDROCHLORIDE 10 MG: 10 TABLET ORAL at 17:10

## 2017-01-01 RX ADMIN — IPRATROPIUM BROMIDE AND ALBUTEROL SULFATE 3 ML: .5; 3 SOLUTION RESPIRATORY (INHALATION) at 18:45

## 2017-01-01 RX ADMIN — Medication 360 ML: at 16:15

## 2017-01-01 RX ADMIN — LACTULOSE 30 ML: 20 SOLUTION ORAL at 09:59

## 2017-01-01 RX ADMIN — HYDROCODONE BITARTRATE AND ACETAMINOPHEN 1 TABLET: 10; 325 TABLET ORAL at 01:59

## 2017-01-01 RX ADMIN — NICOTINE 21 MG: 21 PATCH, EXTENDED RELEASE TRANSDERMAL at 06:44

## 2017-01-01 RX ADMIN — FENTANYL CITRATE 50 MCG: 50 INJECTION, SOLUTION INTRAMUSCULAR; INTRAVENOUS at 23:37

## 2017-01-01 RX ADMIN — OXYCODONE HYDROCHLORIDE 10 MG: 10 TABLET ORAL at 23:54

## 2017-01-01 RX ADMIN — NOREPINEPHRINE BITARTRATE 10 MCG/MIN: 1 INJECTION INTRAVENOUS at 17:30

## 2017-01-01 RX ADMIN — IPRATROPIUM BROMIDE AND ALBUTEROL SULFATE 3 ML: .5; 3 SOLUTION RESPIRATORY (INHALATION) at 16:40

## 2017-01-01 RX ADMIN — CALCITONIN SALMON 280 UNITS: 200 INJECTION, SOLUTION INTRAMUSCULAR; SUBCUTANEOUS at 21:11

## 2017-01-01 RX ADMIN — GUAIFENESIN 200 MG: 100 SOLUTION ORAL at 20:47

## 2017-01-01 RX ADMIN — NICOTINE 21 MG: 21 PATCH, EXTENDED RELEASE TRANSDERMAL at 05:10

## 2017-01-01 RX ADMIN — HYDROCODONE BITARTRATE AND ACETAMINOPHEN 1 TABLET: 10; 325 TABLET ORAL at 13:11

## 2017-01-01 RX ADMIN — FOLIC ACID 1 MG: 1 TABLET ORAL at 08:39

## 2017-01-01 RX ADMIN — DIBASIC SODIUM PHOSPHATE, MONOBASIC POTASSIUM PHOSPHATE AND MONOBASIC SODIUM PHOSPHATE 1 TABLET: 852; 155; 130 TABLET ORAL at 05:06

## 2017-01-01 RX ADMIN — DIBASIC SODIUM PHOSPHATE, MONOBASIC POTASSIUM PHOSPHATE AND MONOBASIC SODIUM PHOSPHATE 1 TABLET: 852; 155; 130 TABLET ORAL at 12:07

## 2017-01-01 RX ADMIN — NICOTINE 21 MG: 21 PATCH, EXTENDED RELEASE TRANSDERMAL at 05:07

## 2017-01-01 RX ADMIN — DIBASIC SODIUM PHOSPHATE, MONOBASIC POTASSIUM PHOSPHATE AND MONOBASIC SODIUM PHOSPHATE 1 TABLET: 852; 155; 130 TABLET ORAL at 05:07

## 2017-01-01 RX ADMIN — GUAIFENESIN 200 MG: 100 SOLUTION ORAL at 20:45

## 2017-01-01 RX ADMIN — STANDARDIZED SENNA CONCENTRATE AND DOCUSATE SODIUM 2 TABLET: 8.6; 5 TABLET, FILM COATED ORAL at 20:24

## 2017-01-01 RX ADMIN — LACTULOSE 30 ML: 20 SOLUTION ORAL at 10:09

## 2017-01-01 RX ADMIN — LEVALBUTEROL 1.25 MG: 1.25 SOLUTION RESPIRATORY (INHALATION) at 14:31

## 2017-01-01 RX ADMIN — ACETAMINOPHEN 650 MG: 325 TABLET, FILM COATED ORAL at 01:14

## 2017-01-01 RX ADMIN — PROPOFOL 30 MCG/KG/MIN: 10 INJECTION, EMULSION INTRAVENOUS at 19:47

## 2017-01-01 RX ADMIN — POTASSIUM PHOSPHATE, MONOBASIC AND POTASSIUM PHOSPHATE, DIBASIC 30 MMOL: 224; 236 INJECTION, SOLUTION INTRAVENOUS at 08:17

## 2017-01-01 RX ADMIN — DEXTROSE MONOHYDRATE: 50 INJECTION, SOLUTION INTRAVENOUS at 03:00

## 2017-01-01 RX ADMIN — Medication 100 MG: at 11:26

## 2017-01-01 RX ADMIN — POTASSIUM CHLORIDE AND SODIUM CHLORIDE: 900; 150 INJECTION, SOLUTION INTRAVENOUS at 04:51

## 2017-01-01 RX ADMIN — OXYCODONE HYDROCHLORIDE 10 MG: 10 TABLET ORAL at 12:07

## 2017-01-01 RX ADMIN — Medication 100 MG: at 08:39

## 2017-01-01 RX ADMIN — ENOXAPARIN SODIUM 40 MG: 100 INJECTION SUBCUTANEOUS at 20:33

## 2017-01-01 RX ADMIN — CALCITONIN SALMON 280 UNITS: 200 INJECTION, SOLUTION INTRAMUSCULAR; SUBCUTANEOUS at 14:50

## 2017-01-01 RX ADMIN — LORAZEPAM 2 MG: 2 INJECTION INTRAMUSCULAR; INTRAVENOUS at 13:29

## 2017-01-01 RX ADMIN — MAGNESIUM SULFATE IN WATER 4 G: 40 INJECTION, SOLUTION INTRAVENOUS at 08:18

## 2017-01-01 RX ADMIN — AMPICILLIN SODIUM AND SULBACTAM SODIUM 3 G: 2; 1 INJECTION, POWDER, FOR SOLUTION INTRAMUSCULAR; INTRAVENOUS at 05:07

## 2017-01-01 RX ADMIN — NOREPINEPHRINE BITARTRATE 8 MCG/MIN: 1 INJECTION INTRAVENOUS at 14:45

## 2017-01-01 RX ADMIN — ACETAMINOPHEN 650 MG: 325 TABLET, FILM COATED ORAL at 22:19

## 2017-01-01 RX ADMIN — CALCITONIN SALMON 280 UNITS: 200 INJECTION, SOLUTION INTRAMUSCULAR; SUBCUTANEOUS at 20:33

## 2017-01-01 RX ADMIN — NOREPINEPHRINE BITARTRATE 5 MCG/MIN: 1 INJECTION INTRAVENOUS at 17:09

## 2017-01-01 RX ADMIN — SODIUM CHLORIDE, POTASSIUM CHLORIDE, SODIUM LACTATE AND CALCIUM CHLORIDE: 600; 310; 30; 20 INJECTION, SOLUTION INTRAVENOUS at 05:34

## 2017-01-01 RX ADMIN — LEVALBUTEROL 1.25 MG: 1.25 SOLUTION RESPIRATORY (INHALATION) at 17:00

## 2017-01-01 RX ADMIN — PROPOFOL 20 MCG/KG/MIN: 10 INJECTION, EMULSION INTRAVENOUS at 15:45

## 2017-01-01 RX ADMIN — PHENOBARBITAL SODIUM 130 MG: 130 INJECTION INTRAMUSCULAR; INTRAVENOUS at 10:33

## 2017-01-01 RX ADMIN — ONDANSETRON 4 MG: 4 TABLET, ORALLY DISINTEGRATING ORAL at 20:14

## 2017-01-01 RX ADMIN — GUAIFENESIN 200 MG: 100 SOLUTION ORAL at 16:38

## 2017-01-01 RX ADMIN — GUAIFENESIN 200 MG: 100 SOLUTION ORAL at 09:59

## 2017-01-01 RX ADMIN — FAMOTIDINE 20 MG: 20 TABLET, FILM COATED ORAL at 20:12

## 2017-01-01 RX ADMIN — IPRATROPIUM BROMIDE AND ALBUTEROL SULFATE 3 ML: .5; 3 SOLUTION RESPIRATORY (INHALATION) at 10:01

## 2017-01-01 RX ADMIN — PHENOBARBITAL SODIUM 130 MG: 130 INJECTION INTRAMUSCULAR; INTRAVENOUS at 00:22

## 2017-01-01 RX ADMIN — SODIUM CHLORIDE, POTASSIUM CHLORIDE, SODIUM LACTATE AND CALCIUM CHLORIDE: 600; 310; 30; 20 INJECTION, SOLUTION INTRAVENOUS at 02:46

## 2017-01-01 RX ADMIN — ENOXAPARIN SODIUM 40 MG: 100 INJECTION SUBCUTANEOUS at 08:39

## 2017-01-01 RX ADMIN — LORAZEPAM 0.5 MG: 0.5 TABLET ORAL at 14:00

## 2017-01-01 RX ADMIN — PROPOFOL 40 MCG/KG/MIN: 10 INJECTION, EMULSION INTRAVENOUS at 23:59

## 2017-01-01 RX ADMIN — MIDAZOLAM HYDROCHLORIDE 2 MG: 1 INJECTION INTRAMUSCULAR; INTRAVENOUS at 15:22

## 2017-01-01 RX ADMIN — DEXTROSE MONOHYDRATE: 50 INJECTION, SOLUTION INTRAVENOUS at 09:13

## 2017-01-01 RX ADMIN — ROCURONIUM BROMIDE 50 MG: 10 SOLUTION INTRAVENOUS at 15:47

## 2017-01-01 RX ADMIN — AMPICILLIN SODIUM AND SULBACTAM SODIUM 3 G: 2; 1 INJECTION, POWDER, FOR SOLUTION INTRAMUSCULAR; INTRAVENOUS at 05:09

## 2017-01-01 RX ADMIN — OXYCODONE HYDROCHLORIDE 10 MG: 10 TABLET ORAL at 18:42

## 2017-01-01 RX ADMIN — PHENOBARBITAL SODIUM 130 MG: 130 INJECTION INTRAMUSCULAR; INTRAVENOUS at 11:56

## 2017-01-01 RX ADMIN — STANDARDIZED SENNA CONCENTRATE AND DOCUSATE SODIUM 2 TABLET: 8.6; 5 TABLET, FILM COATED ORAL at 20:14

## 2017-01-01 RX ADMIN — FOLIC ACID 1 MG: 1 TABLET ORAL at 11:26

## 2017-01-01 RX ADMIN — AMPICILLIN SODIUM AND SULBACTAM SODIUM 3 G: 2; 1 INJECTION, POWDER, FOR SOLUTION INTRAMUSCULAR; INTRAVENOUS at 05:20

## 2017-01-01 RX ADMIN — DIBASIC SODIUM PHOSPHATE, MONOBASIC POTASSIUM PHOSPHATE AND MONOBASIC SODIUM PHOSPHATE 1 TABLET: 852; 155; 130 TABLET ORAL at 18:56

## 2017-01-01 RX ADMIN — DIBASIC SODIUM PHOSPHATE, MONOBASIC POTASSIUM PHOSPHATE AND MONOBASIC SODIUM PHOSPHATE 1 TABLET: 852; 155; 130 TABLET ORAL at 11:08

## 2017-01-01 RX ADMIN — PROPOFOL 30 MCG/KG/MIN: 10 INJECTION, EMULSION INTRAVENOUS at 12:16

## 2017-01-01 RX ADMIN — DIBASIC SODIUM PHOSPHATE, MONOBASIC POTASSIUM PHOSPHATE AND MONOBASIC SODIUM PHOSPHATE 1 TABLET: 852; 155; 130 TABLET ORAL at 01:14

## 2017-01-01 RX ADMIN — AMPICILLIN SODIUM AND SULBACTAM SODIUM 3 G: 2; 1 INJECTION, POWDER, FOR SOLUTION INTRAMUSCULAR; INTRAVENOUS at 05:15

## 2017-01-01 RX ADMIN — GUAIFENESIN 200 MG: 100 SOLUTION ORAL at 05:06

## 2017-01-01 RX ADMIN — IPRATROPIUM BROMIDE AND ALBUTEROL SULFATE 3 ML: .5; 3 SOLUTION RESPIRATORY (INHALATION) at 11:21

## 2017-01-01 RX ADMIN — PHENOBARBITAL SODIUM 130 MG: 130 INJECTION INTRAMUSCULAR; INTRAVENOUS at 20:18

## 2017-01-01 RX ADMIN — CHLORHEXIDINE GLUCONATE 15 ML: 1.2 RINSE ORAL at 19:43

## 2017-01-01 RX ADMIN — FENTANYL CITRATE 50 MCG: 50 INJECTION, SOLUTION INTRAMUSCULAR; INTRAVENOUS at 14:41

## 2017-01-01 RX ADMIN — ENOXAPARIN SODIUM 40 MG: 100 INJECTION SUBCUTANEOUS at 08:17

## 2017-01-01 RX ADMIN — PHENOBARBITAL SODIUM 776 MG: 130 INJECTION INTRAMUSCULAR; INTRAVENOUS at 18:18

## 2017-01-01 RX ADMIN — NOREPINEPHRINE BITARTRATE 3 MCG/MIN: 1 INJECTION INTRAVENOUS at 12:26

## 2017-01-01 RX ADMIN — POTASSIUM CHLORIDE AND SODIUM CHLORIDE: 900; 150 INJECTION, SOLUTION INTRAVENOUS at 10:30

## 2017-01-01 RX ADMIN — MORPHINE SULFATE 10 MG: 10 INJECTION INTRAVENOUS at 14:34

## 2017-01-01 RX ADMIN — LEVALBUTEROL 1.25 MG: 1.25 SOLUTION RESPIRATORY (INHALATION) at 07:22

## 2017-01-01 RX ADMIN — IPRATROPIUM BROMIDE AND ALBUTEROL SULFATE 3 ML: .5; 3 SOLUTION RESPIRATORY (INHALATION) at 06:46

## 2017-01-01 RX ADMIN — HEPARIN SODIUM 5000 UNITS: 5000 INJECTION, SOLUTION INTRAVENOUS; SUBCUTANEOUS at 14:41

## 2017-01-01 RX ADMIN — HEPARIN SODIUM 5000 UNITS: 5000 INJECTION, SOLUTION INTRAVENOUS; SUBCUTANEOUS at 21:10

## 2017-01-01 RX ADMIN — POTASSIUM CHLORIDE AND SODIUM CHLORIDE: 900; 150 INJECTION, SOLUTION INTRAVENOUS at 20:51

## 2017-01-01 RX ADMIN — LORAZEPAM 1 MG: 0.5 TABLET ORAL at 20:24

## 2017-01-01 RX ADMIN — GUAIFENESIN 200 MG: 100 SOLUTION ORAL at 03:08

## 2017-01-01 RX ADMIN — THERA TABS 1 TABLET: TAB at 08:39

## 2017-01-01 RX ADMIN — GUAIFENESIN 200 MG: 100 SOLUTION ORAL at 16:04

## 2017-01-01 RX ADMIN — HYDROCODONE BITARTRATE AND ACETAMINOPHEN 1 TABLET: 10; 325 TABLET ORAL at 12:04

## 2017-01-01 RX ADMIN — NOREPINEPHRINE BITARTRATE 10 MCG/MIN: 1 INJECTION INTRAVENOUS at 01:53

## 2017-01-01 RX ADMIN — MAGNESIUM SULFATE IN WATER 4 G: 40 INJECTION, SOLUTION INTRAVENOUS at 07:27

## 2017-01-01 RX ADMIN — LEVALBUTEROL 1.25 MG: 1.25 SOLUTION RESPIRATORY (INHALATION) at 10:57

## 2017-01-01 RX ADMIN — DIBASIC SODIUM PHOSPHATE, MONOBASIC POTASSIUM PHOSPHATE AND MONOBASIC SODIUM PHOSPHATE 1 TABLET: 852; 155; 130 TABLET ORAL at 17:29

## 2017-01-01 RX ADMIN — QUETIAPINE FUMARATE 50 MG: 25 TABLET ORAL at 19:43

## 2017-01-01 RX ADMIN — IPRATROPIUM BROMIDE AND ALBUTEROL SULFATE 3 ML: .5; 3 SOLUTION RESPIRATORY (INHALATION) at 18:29

## 2017-01-01 RX ADMIN — MORPHINE SULFATE 10 MG: 10 INJECTION INTRAVENOUS at 12:37

## 2017-01-01 RX ADMIN — IPRATROPIUM BROMIDE AND ALBUTEROL SULFATE 3 ML: .5; 3 SOLUTION RESPIRATORY (INHALATION) at 19:00

## 2017-01-01 RX ADMIN — LACTULOSE 30 ML: 20 SOLUTION ORAL at 20:39

## 2017-01-01 RX ADMIN — POTASSIUM CHLORIDE 20 MEQ: 1500 TABLET, EXTENDED RELEASE ORAL at 20:12

## 2017-01-01 RX ADMIN — ACETAMINOPHEN 650 MG: 325 TABLET, FILM COATED ORAL at 16:36

## 2017-01-01 RX ADMIN — FAMOTIDINE 20 MG: 20 TABLET, FILM COATED ORAL at 19:43

## 2017-01-01 RX ADMIN — PROPOFOL 30 MCG/KG/MIN: 10 INJECTION, EMULSION INTRAVENOUS at 05:20

## 2017-01-01 RX ADMIN — DESMOPRESSIN ACETATE 2 MCG: 4 INJECTION, SOLUTION INTRAVENOUS; SUBCUTANEOUS at 20:49

## 2017-01-01 RX ADMIN — SODIUM CHLORIDE, POTASSIUM CHLORIDE, SODIUM LACTATE AND CALCIUM CHLORIDE: 600; 310; 30; 20 INJECTION, SOLUTION INTRAVENOUS at 15:24

## 2017-01-01 RX ADMIN — OXYCODONE HYDROCHLORIDE 10 MG: 10 TABLET ORAL at 05:20

## 2017-01-01 RX ADMIN — ENOXAPARIN SODIUM 40 MG: 100 INJECTION SUBCUTANEOUS at 09:03

## 2017-01-01 RX ADMIN — GADODIAMIDE 15 ML: 287 INJECTION INTRAVENOUS at 23:58

## 2017-01-01 RX ADMIN — DIBASIC SODIUM PHOSPHATE, MONOBASIC POTASSIUM PHOSPHATE AND MONOBASIC SODIUM PHOSPHATE 1 TABLET: 852; 155; 130 TABLET ORAL at 17:10

## 2017-01-01 RX ADMIN — LORAZEPAM 2 MG: 2 INJECTION INTRAMUSCULAR; INTRAVENOUS at 20:58

## 2017-01-01 RX ADMIN — AMPICILLIN SODIUM AND SULBACTAM SODIUM 3 G: 2; 1 INJECTION, POWDER, FOR SOLUTION INTRAMUSCULAR; INTRAVENOUS at 19:31

## 2017-01-01 RX ADMIN — NICOTINE 21 MG: 21 PATCH, EXTENDED RELEASE TRANSDERMAL at 05:06

## 2017-01-01 RX ADMIN — SCOPOLAMINE 1 PATCH: 1 PATCH, EXTENDED RELEASE TRANSDERMAL at 12:38

## 2017-01-01 RX ADMIN — FENTANYL CITRATE 100 MCG: 50 INJECTION, SOLUTION INTRAMUSCULAR; INTRAVENOUS at 22:30

## 2017-01-01 ASSESSMENT — LIFESTYLE VARIABLES
VISUAL DISTURBANCES: NOT PRESENT
TOTAL SCORE: 11
ANXIETY: MILDLY ANXIOUS
AUDITORY DISTURBANCES: NOT PRESENT
HEADACHE, FULLNESS IN HEAD: NOT PRESENT
AUDITORY DISTURBANCES: NOT PRESENT
VISUAL DISTURBANCES: NOT PRESENT
HEADACHE, FULLNESS IN HEAD: NOT PRESENT
PAROXYSMAL SWEATS: BARELY PERCEPTIBLE SWEATING. PALMS MOIST
TREMOR: *
ORIENTATION AND CLOUDING OF SENSORIUM: DISORIENTED FOR PLACE AND / OR PERSON
HEADACHE, FULLNESS IN HEAD: VERY MILD
AGITATION: *
ORIENTATION AND CLOUDING OF SENSORIUM: DATE DISORIENTATION BY NO MORE THAN TWO CALENDAR DAYS
AGITATION: NORMAL ACTIVITY
TOTAL SCORE: 18
AGITATION: *
AGITATION: *
ANXIETY: MILDLY ANXIOUS
ANXIETY: *
EVER_SMOKED: YES
HEADACHE, FULLNESS IN HEAD: NOT PRESENT
VISUAL DISTURBANCES: NOT PRESENT
ANXIETY: MILDLY ANXIOUS
AGITATION: MODERATELY FIDGETY AND RESTLESS
TOTAL SCORE: 10
TREMOR: *
ORIENTATION AND CLOUDING OF SENSORIUM: DATE DISORIENTATION BY NO MORE THAN TWO CALENDAR DAYS
TOTAL SCORE: 7
ORIENTATION AND CLOUDING OF SENSORIUM: DISORIENTED FOR PLACE AND / OR PERSON
TOTAL SCORE: 10
PAROXYSMAL SWEATS: *
VISUAL DISTURBANCES: NOT PRESENT
HEADACHE, FULLNESS IN HEAD: NOT PRESENT
TREMOR: NO TREMOR
AGITATION: MODERATELY FIDGETY AND RESTLESS
NAUSEA AND VOMITING: NO NAUSEA AND NO VOMITING
REASON UNABLE TO ASSESS: UNABLE TO ANSWER
ORIENTATION AND CLOUDING OF SENSORIUM: DISORIENTED FOR PLACE AND / OR PERSON
NAUSEA AND VOMITING: NO NAUSEA AND NO VOMITING
AGITATION: NORMAL ACTIVITY
ANXIETY: *
NAUSEA AND VOMITING: NO NAUSEA AND NO VOMITING
AUDITORY DISTURBANCES: NOT PRESENT
TREMOR: MODERATE TREMOR WITH ARMS EXTENDED
NAUSEA AND VOMITING: NO NAUSEA AND NO VOMITING
PAROXYSMAL SWEATS: NO SWEAT VISIBLE
HEADACHE, FULLNESS IN HEAD: NOT PRESENT
REASON UNABLE TO ASSESS: PT CONFUSED
AUDITORY DISTURBANCES: NOT PRESENT
ANXIETY: MODERATELY ANXIOUS OR GUARDED, SO ANXIETY IS INFERRED
ORIENTATION AND CLOUDING OF SENSORIUM: CANNOT DO SERIAL ADDITIONS OR IS UNCERTAIN ABOUT DATE
TOTAL SCORE: 12
HEADACHE, FULLNESS IN HEAD: NOT PRESENT
NAUSEA AND VOMITING: NO NAUSEA AND NO VOMITING
ORIENTATION AND CLOUDING OF SENSORIUM: CANNOT DO SERIAL ADDITIONS OR IS UNCERTAIN ABOUT DATE
AUDITORY DISTURBANCES: NOT PRESENT
PAROXYSMAL SWEATS: NO SWEAT VISIBLE
AUDITORY DISTURBANCES: NOT PRESENT
AUDITORY DISTURBANCES: NOT PRESENT
PAROXYSMAL SWEATS: NO SWEAT VISIBLE
REASON UNABLE TO ASSESS: PT INTUBATED AND SEDATED
ORIENTATION AND CLOUDING OF SENSORIUM: DATE DISORIENTATION BY NO MORE THAN TWO CALENDAR DAYS
VISUAL DISTURBANCES: NOT PRESENT
TREMOR: *
PAROXYSMAL SWEATS: NO SWEAT VISIBLE
TREMOR: *
TOTAL SCORE: 7
ANXIETY: NO ANXIETY (AT EASE)
PACK_YEARS: 50+
AUDITORY DISTURBANCES: NOT PRESENT
AGITATION: SOMEWHAT MORE THAN NORMAL ACTIVITY
VISUAL DISTURBANCES: NOT PRESENT
PAROXYSMAL SWEATS: BARELY PERCEPTIBLE SWEATING. PALMS MOIST
NAUSEA AND VOMITING: NO NAUSEA AND NO VOMITING
HEADACHE, FULLNESS IN HEAD: MILD
AGITATION: SOMEWHAT MORE THAN NORMAL ACTIVITY
ORIENTATION AND CLOUDING OF SENSORIUM: DISORIENTED FOR PLACE AND / OR PERSON
AGITATION: *
NAUSEA AND VOMITING: NO NAUSEA AND NO VOMITING
TREMOR: *
VISUAL DISTURBANCES: NOT PRESENT
AUDITORY DISTURBANCES: NOT PRESENT
HEADACHE, FULLNESS IN HEAD: NOT PRESENT
AGITATION: SOMEWHAT MORE THAN NORMAL ACTIVITY
PAROXYSMAL SWEATS: NO SWEAT VISIBLE
VISUAL DISTURBANCES: MILD SENSITIVITY
VISUAL DISTURBANCES: NOT PRESENT
AUDITORY DISTURBANCES: NOT PRESENT
ANXIETY: MODERATELY ANXIOUS OR GUARDED, SO ANXIETY IS INFERRED
TOTAL SCORE: 4
ORIENTATION AND CLOUDING OF SENSORIUM: DATE DISORIENTATION BY NO MORE THAN TWO CALENDAR DAYS
TOTAL SCORE: 13
HEADACHE, FULLNESS IN HEAD: NOT PRESENT
ANXIETY: MILDLY ANXIOUS
PAROXYSMAL SWEATS: BARELY PERCEPTIBLE SWEATING. PALMS MOIST
TOTAL SCORE: 7
TREMOR: MODERATE TREMOR WITH ARMS EXTENDED
NAUSEA AND VOMITING: NO NAUSEA AND NO VOMITING
ANXIETY: MILDLY ANXIOUS
TREMOR: NO TREMOR
VISUAL DISTURBANCES: NOT PRESENT
VISUAL DISTURBANCES: MILD SENSITIVITY
AUDITORY DISTURBANCES: NOT PRESENT
NAUSEA AND VOMITING: NO NAUSEA AND NO VOMITING
NAUSEA AND VOMITING: NO NAUSEA AND NO VOMITING
ANXIETY: *
PAROXYSMAL SWEATS: BARELY PERCEPTIBLE SWEATING. PALMS MOIST
NAUSEA AND VOMITING: NO NAUSEA AND NO VOMITING
HEADACHE, FULLNESS IN HEAD: NOT PRESENT
TREMOR: TREMOR NOT VISIBLE BUT CAN BE FELT, FINGERTIP TO FINGERTIP
REASON UNABLE TO ASSESS: SEDATED
TREMOR: MODERATE TREMOR WITH ARMS EXTENDED
ORIENTATION AND CLOUDING OF SENSORIUM: DATE DISORIENTATION BY NO MORE THAN TWO CALENDAR DAYS
TOTAL SCORE: 12
PAROXYSMAL SWEATS: BARELY PERCEPTIBLE SWEATING. PALMS MOIST

## 2017-01-01 ASSESSMENT — PAIN SCALES - GENERAL
PAINLEVEL_OUTOF10: ASSUMED PAIN PRESENT
PAINLEVEL_OUTOF10: ASSUMED PAIN PRESENT
PAINLEVEL_OUTOF10: 5
PAINLEVEL_OUTOF10: 5
PAINLEVEL_OUTOF10: ASSUMED PAIN PRESENT
PAINLEVEL_OUTOF10: 0

## 2017-01-01 ASSESSMENT — COPD QUESTIONNAIRES
DURING THE PAST 4 WEEKS HOW MUCH DID YOU FEEL SHORT OF BREATH: SOME OF THE TIME
COPD SCREENING SCORE: 7
HAVE YOU SMOKED AT LEAST 100 CIGARETTES IN YOUR ENTIRE LIFE: YES
DO YOU EVER COUGH UP ANY MUCUS OR PHLEGM?: YES, A FEW DAYS A WEEK OR MONTH

## 2017-01-01 ASSESSMENT — PULMONARY FUNCTION TESTS
FVC: 601
FVC: 519

## 2017-08-07 PROBLEM — E83.52 HYPERCALCEMIA: Status: ACTIVE | Noted: 2017-01-01

## 2017-08-07 PROBLEM — C34.90 LUNG CANCER METASTATIC TO BONE (HCC): Status: ACTIVE | Noted: 2017-01-01

## 2017-08-07 PROBLEM — C79.51 LUNG CANCER METASTATIC TO BONE (HCC): Status: ACTIVE | Noted: 2017-01-01

## 2017-08-07 NOTE — PROGRESS NOTES
Direct admit from home. Accepted by Dr. Yung/Rosy (resident) for hypercalcemia.  ADT signed & held, needs to be released upon pt arrival.  No written orders received.  Pt coming by car.
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
## 2017-08-07 NOTE — PROGRESS NOTES
Pt arrived to floor at 1310, pt arrived by car with his wife, pt brought his wheelchair, called admitting to admit patient in system, 2 RN skin check done, skin is intact, one healed dried scab noted to right forehead, dry heels noted bilaterally. Pt is comfortable in bed, call light placed within reach.

## 2017-08-08 NOTE — PROGRESS NOTES
Assumed care of pt at 0700, pt AAOx4 extremely aggitated, verbally aggressive and uncooperative. Pt attempting to leave AMA multiple times this AM, security involved.   Pt allowed for lab draw, critical calcium level of 12.4 read to MD Gallegos from [facility].   Pt wife now at bedside, discussed POC and pt being on medical hold.   Pt ripped off ID band, in regular clothes and refusing bedalarm. Discussed POC with pt and wife. Pt agreeable at this time to an IV.

## 2017-08-08 NOTE — CARE PLAN
Problem: Communication  Goal: The ability to communicate needs accurately and effectively will improve  Outcome: NOT MET  Reoriented pt to environment as needed; white board updated with [facility] staff and return time. Pt notified of hourly rounding and unit routine. POC discussed. Appropriate signs in place at doorway for pt. Pt allowed time to ask questions; pt uncooperative and irritable. New onset confusion; A&O x 2. Unable to finish admission. Will reattempt. Education provided several times by both RN and charge nurse.    Problem: Safety  Goal: Will remain free from injury  Outcome: PROGRESSING AS EXPECTED  BA on. Room near nursing station. Will continue to monitor. Pt A&O x 2.
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
## 2017-08-08 NOTE — PROGRESS NOTES
Pt becoming increasingly agitated, trying to rip out IV. RN contacted MD Gallegos to notify. Received ok from MD for extra dose of ativan. 2mg given.  CT scheduled for 2pm, pt and family updated.

## 2017-08-08 NOTE — PROGRESS NOTES
Pt still agitated with increased weakness, x2 assist to bed, RN and transport assisted pt. Pt taken down to CT. Still confused.  Wife of pt going home for rest, will be back this afternoon.

## 2017-08-08 NOTE — PROGRESS NOTES
"Pt attempted to elope at 0700; security and Dr. Gallegos notified. Pt attempted to catch unit doors to staff elevators with his hands, but security was able to prevent pt from leaving unit or smashing his hand in between the doors. After several minutes of redirection, pt agreed to go back to room to speak with providers and ambulated back to room with security and unit staff. Pt continues to be confused and angry throughout conversation with staff. Pt stated he was being held against his will and wanted to see \"the paperwork\". Pt continues to be disoriented to time and event, with moments of bewilderment. Pt stated he has been purposely left out of staff meetings about his care; all changes in pt POC discussed as changed/developed.       Report given to day shift nurse. Paperwork discussed with provider and is in paper chart.   "

## 2017-08-08 NOTE — PROGRESS NOTES
"Paged regarding patient agitation and irritability.     Patient removed IV lines, refusing fluids or medications; stating that he wanted to leave AMA.     Seen at bedside at 0100.   Patient indicated anger and frustration; stated he had no idea that he was agreeing to stay overnight and felt that \"this has been going on for months, there's no reason for me to stay in the hospital for all of this.     Discussed elevated calcium levels and acute kidney injury and reason for IV fluid recommendation. Patient stated understanding of plan, but declined current treatments. He stated he would gladly orally hydrate, but was frustrated with hospital infrastructure (ie: bed alarm placed due to patient unpredictability and unstable gait). He refused replacement of IV at this time. Additionally refusing other treatments.     On my exam, patient was awake, alert, and oriented x4; aside from occasional word finding issues, he appeared to demonstrate logical thinking on my assessment at 0100. Do not feel patient needs to be placed on legal hold at this time, though per nursing, mental status may be waxing and waning, so that may potentially change. Patient agreed to remain until 0700 for now. Encouraged patient to ask for help if needing to get up from bed. Encouraged to wait until morning team arrived. Patient still frustrated, but agreed with current plan.     "

## 2017-08-08 NOTE — PROGRESS NOTES
"Pt attempting to leave AMA. Pt redirected and educated with no success. Pt continues to be disoriented to time and event, with episodes of delusion and bewilderment. Pt not understanding or cooperative of plan or care. Increasingly confused at times. States his wife was supposed to pick him up from the hospital at \"7 last night\". Increased agitation and irritability.     [facility] paged again with pt update; discussed legal hold and pt's inability to make decisions at this time. Legal hold 2000 initiated by Dr. Lorena Sotomayor. Legal hold 2000 started at 0500 for failure to care for self and make decisions, r/t confusion. Nursing communication in place. Pt allowed call light, phone, visitors, and personal belonings at this time per Lorena Sotomayor MD verbal telephone order. No 1:1 or restraints at this time. Dr. Sotomayor stated that she would be at [facility] to assess again. Pt was previously by Dr. Sotomayor at 0130 on 8/8.    Wife Hiral notified and updated about pt's change in status. Pt previously cleared ability to notify wife of changes/updates at beginning of shift. Wife Hiral stated she understands and believes that \"long acting pain medications\" would make him less \"loopy in the head\". Wife states she understands importance of her  receiving care and will not pick him up or take him from the hospital as long as the legal hold is in place. States she will be at hospital around 0830 for updates and to talk with staff.     Legal hold paperwork initiated. Dr. Gallegos on unit at 0600 to assess. Pt asleep. Update given to Dr. Gallegos. Will continue to monitor.   "

## 2017-08-08 NOTE — PROGRESS NOTES
Yannick from Lab called with critical result of Calcium 12.4 at 0830. Critical lab result read back to Yannick.   Dr. Lion notified of critical lab result at 0833.  Critical lab result read back by Dr. Lion.

## 2017-08-08 NOTE — PROGRESS NOTES
Medical records received from MD office:  H&P, Labs, CT chest/abd/pelvic, and Demographics.  Scanned into Media tab.

## 2017-08-08 NOTE — PROGRESS NOTES
Pt attempted to leave AMA; education provided and attempts made to work with the pt by this RN. Pt is confused and delusional at times; extremely agitated and uncooperative. A&O x 2. Pt pulled out his IV and is currently refusing any medical care, including NS bolus and  on. Situation escalated to charge nurse. Charge nurse also attempted to educate and work with the pt. Pt stated he will stay until the morning when he is seen by a MD, but continues to refuse any care/medication.      Bed alarm on for safety due to new onset confusion, pt education provided. Pt's BP has been trending low since direct admission today during day shift; tachycardia. N/V at change of shift, resolved with PRN zofran. Pain medication declined.     [facility] paged in regards to pt update; on call Dr. Sotomayor stated she would see pt within an hour.     Will continue to monitor.

## 2017-08-08 NOTE — PROGRESS NOTES
Hold status note    AM update:    Patient seen at bedside with team and patient's wife Елена is present. She affirms that he is confused, not behaving like himself, and that he does not understand the situation in order to make medical decisions. Patient agrees intermittently to receiving care, but then states he is leaving, gets dressed, and attempts to leave the floor.     Medically, patient has several possible reasons for delirium including possibility of brain mets, known hypercalcemia, possible sepsis with elevated lactate and WBC.    At this time, legal hold is not appropriate as patient does not have evidence of psychiatric illness.    Patient is to be maintained on MEDICAL HOLD. May not leave AMA. Wife's permission has been obtained to use restraints, chemical and/or physical, if needed.

## 2017-08-09 NOTE — PROGRESS NOTES
Late entry: 2000 went in to assess pt for CIWA protocol and discovered an unopened can of Bud[employer] Beer on the tray table. Pt was not alert enough to answer any questions about the beer. This RN took the beer and locked it in the pts med drawer.

## 2017-08-09 NOTE — PROGRESS NOTES
Paged regarding increasing oxygen requirement, fluctuating heart rate, and agitation, difficulty redirecting.     Patient seen at bedside. Continuously trying to get out of bed. Refusing fluids for now. Confused, multiple nurses in room attempting to redirect. Patient not making sense.     Vitals:   HR 90s  RR 18  SpO2 94% 5L NC    Gen: Appears to be in no acute distress.   Neuro: Confused, believes he is on a ship; disoriented to place, time, and situation.   Heart: RRR  Pulm: No increased work of breathing; equal breath sounds throughout; CTAB    Assessment:   Delirium vs withdrawal.   CIWA scores 7, 11, and 12; received Ativan 0.5, 2, and 2.   Afebrile and no obvious source of infection; not currently on abx.   Will obtain EKG; would limit Haldol if prolonged QT.   CXR and UA to assess for areas of infection.   Will continue supportive treatment with fluid hydration and medications as needed.     Plan:   EKG  CIWA with Ativan prn withdrawal symptoms  Haldol prn agitation and risk of harm to self  Would like to continue fluids as ordered; NS @125cc/hr

## 2017-08-09 NOTE — PROGRESS NOTES
"RN placed pt on  once pt came back to room from CT. Pt began to waken and become agitated around 1700. As pt was wakening, O2 dipped to 78% on RA and pts HR jumped from 40s to 120s. Pt was placed on 5L O2, sating now in mid 90s. Pt also stating \"I am on a ship\" and very disoriented other than to self. RN contacted rapid response RNs, Shannon from [facility] and Any from [facility]. RRT RNs came to room to lay eyes on pt. After completing a CIWA, pt scored a 12, RN administered 2mg oral ativan. During this time it was decided to contact MD Gallegos, who stated to continue CIWA protocol and page MD Sotomayor to update. This RN contacted MD Sotomayor and updated MD on pts current status. MD Sotomayor ordered haldol PRN as well as EKG. Pt beginning to calm down at this time, becoming more cooperative however still confused.  "

## 2017-08-09 NOTE — PROGRESS NOTES
Shahana from Lab called with critical result of Ca+ at 12.8. Critical lab result read back to Shahana.   Dr. Sotomayor notified of critical lab result at 0500.  Critical lab result read back by Dr. Sotomayor.

## 2017-08-09 NOTE — PROGRESS NOTES
"[facility] FAMILY MEDICINE PROGRESS NOTE     Attending: Dr. Erlinda Sotomayor  Resident: Dr. Gallegos    PATIENT: Victor Manuel Glasgow; [id number]; 1955; Hospital Day: 2    ID: 61 y.o. male admitted for workup of new finding of metastatic masses.    OVERNIGHT EVENTS: Patient became gradually more confused during the night. Had to be talked out of leaving the hospital multiple times. In early morning, security had to be called to keep patient from leaving, as he was behaving impulsively and did not seem to understand why he was in the hospital. He was oriented to self, time, and [facility] the [facility] but did not have good insight or judgement, stated he had been in the hospital for 4 days and he needed to leave.  He does not have any new concerns, and walks around and carries his bag without evidence of pain.    OBJECTIVE: BP 95/59 mmHg  Pulse 102  Temp(Src) 36.6 °C (97.8 °F)  Resp 14  Ht 1.829 m (6')  Wt 71.215 kg (157 lb)  BMI 21.29 kg/m2  SpO2 91%  Filed Vitals:    08/08/17 0400 08/08/17 0900 08/08/17 1427 08/08/17 1558   BP: 99/63 87/54 112/66 95/59   Pulse: 99 90 103 102   Temp: 36.3 °C (97.4 °F) 36.1 °C (96.9 °F) 36.6 °C (97.9 °F) 36.6 °C (97.8 °F)   Resp: 17 16 14 14   Height:       Weight:       SpO2: 93% 92% 91% 91%   Pulse Oximetry: 91 %, O2 (LPM): 0, O2 Delivery: None (Room Air)      Intake/Output Summary (Last 24 hours) at 08/08/17 1815  Last data filed at 08/07/17 2026   Gross per 24 hour   Intake     20 ml   Output    350 ml   Net   -330 ml      DIET:   Orders Placed This Encounter   Procedures   • Diet Order     Standing Status: Standing      Number of Occurrences: 1      Standing Expiration Date:      Order Specific Question:  Diet:     Answer:  Regular [1]       MEDS:    Current facility-administered medications:   •  lorazepam  •  lorazepam **OR** lorazepam  •  lorazepam **OR** lorazepam  •  lorazepam **OR** lorazepam  •  lorazepam **OR** lorazepam  •  lorazepam  •  " lorazepam  •  0.9 % NaCl with KCl 20 mEq 1,000 mL  •  nicotine  •  senna-docusate **AND** polyethylene glycol/lytes **AND** magnesium hydroxide **AND** bisacodyl  •  ondansetron  •  ondansetron  •  promethazine  •  promethazine  •  prochlorperazine  •  zolpidem  •  hydrocodone/acetaminophen    PE:  General: No acute distress, sleeping lying back in bed when I arrive, after awakening is somewhat agitated, comes out of his room frequently. Oriented to person, time, place but not quite to situation. Seems impulsive and labile.  HEENT: Normocephalic, atraumatic. Dry mucous membranes  Respiratory: Symmetric chest rise  Abdomen: non-distended  EXT: LAIRD, No C/C/E  Neuro: no gross focal deficits    LABS:  Lab Results   Component Value Date    WBC 16.8* 08/08/2017    HEMOGLOBIN 14.8 08/08/2017    HEMATOCRIT 42.1 08/08/2017    PLATELETCT 216 08/08/2017    MCV 93.3 08/08/2017     Lab Results   Component Value Date    SODIUM 133* 08/08/2017    POTASSIUM 4.4 08/08/2017    CHLORIDE 95* 08/08/2017    CO2 29 08/08/2017    BUN 51* 08/08/2017    CREATININE 2.37* 08/08/2017    GLUCOSE 114* 08/08/2017     ASSESSMENT/PLAN:  Victor Manuel Glasgow is a 61 y.o. male with metastatic cancer of unknown primary, likely lung.    Metastatic disease  - CT Scan: results came back on 08/05/2017 with growth that was seen in the left upper lobe  likely primary lung cancer. Mets were noted in liver, bone, and maybe adrenal glands.  - alk phos and calcium elevated due to bony involvement  - weight loss and poor appetite due to cancer  - Ddx: metastatic lung cancer, prostate cancer, or melanoma  - discussed with patient, this is a new diagnosis over the past 2 weeks  - interested in biopsy diagnosis and oncology involvement. Will consider IR vs pulm consult to obtain tissue.  - home norco 10s for pain, has not needed. Patient interested in cannabis oil.  - head CT today to evaluate for intracranial mets    Delirium  - altered mentation confirmed by wife  this morning, poor judgement and insight rather than disorientation or lethargy  - likely due to mets, vs metabolic encephalopathy vs alcohol withdrawal  - unsafe to make decision to leave AMA. Per wife may use restraints if needed  - better when wife and friends at bedside, continue redirection  - ativan can be given PRN    Alcoholism  - states 2-3 beers nightly, down from 6 pack  - last drink 8/06  - [facility] protocol ordered    Hypercalcemia  - Ca at 13.9 on outpatient labs, today is 12  - considered moderate elevation, not critical if chronic  - treatment with fluids, avoid high calcium diet or medications  -> will stop patients home HCTZ  -> continue fluid at rate of 125  -> recheck in AM    Leukocytosis  - WBC to 16 today, lactate 2.3  - unclear etiology. Vitals not consistent with sepsis. No clinical infections  -> blood cultures, UA  -> follow up lactate    Renal Insufficiency  - Patient labs demonstrated Cr level of 2.6, today improved to 2.3  - unclear chronic or acute. No baseline known, but PCP notes do not mention chronic kidney disease  - rehydrate as above    Stable, chronic problems  HTN: normotensive today, hold lisinopril/HCTZ  HLD: stable, cont home statin at discharge     FEN/Prophy/Core    - IVF: NS for rehydration  - Electrolytes: wnl  - Diet: regular  - bowel regimen: per protocol  - DVT prophy: ambulatory  - GI prophy: none  - Lines: PIV  - Tubes: None  - Code: DNR    Dispo: Inpatient for further workup of likely metastatic lung cancer, leukocytosis, hypercalcemia

## 2017-08-09 NOTE — NON-PROVIDER
[facility] – [place] [facility] [place]GRESS [place]     Attending: Dr. Erlinda Sotomayor  Resident: Dr. Gallegos    PATIENT: Victor Manuel Glasgow; [id number]; 1955; Hospital Day: 3       Room: T304    ID: 61 y.o. male admitted for back pain and diagnosis of metastatic lung cancer.    SUBJECTIVE:  - Patient was agitated all throughout the afternoon and evening.  - Multiple doses of Ativan were given to calm patient down.  - Increasing disorientation, might be due to doses of ativan. Patient thought he was on a ship overnight.  - Haldol has been prescribed PRN.   - Patient had to be put in restraints due to continuous removal of IV lines. He took out his IV lines shortly after talking with him this morning.  - Although patient was compliant his mental state has worsened due to the use of Ativan. Upon examination, he was completely naked and exposed.        OBJECTIVE: /62 mmHg  Pulse 105  Temp(Src) 36.9 °C (98.5 °F)  Resp 17  Ht 1.829 m (6')  Wt 71.215 kg (157 lb)  BMI 21.29 kg/m2  SpO2 99%  Filed Vitals:    08/08/17 1558 08/08/17 1700 08/08/17 1800 08/08/17 1930   BP: 95/59   124/62   Pulse: 102   105   Temp: 36.6 °C (97.8 °F)   36.9 °C (98.5 °F)   Resp: 14   17   Height:       Weight:       SpO2: 91% 78% 94% 99%   Pulse Oximetry: 99 %, O2 (LPM): 0, O2 Delivery: Nasal Cannula      Intake/Output Summary (Last 24 hours) at 08/09/17 0627  Last data filed at 08/09/17 0310   Gross per 24 hour   Intake      0 ml   Output    200 ml   Net   -200 ml      DIET:   Orders Placed This Encounter   Procedures   • Diet Order     Standing Status: Standing      Number of Occurrences: 1      Standing Expiration Date:      Order Specific Question:  Diet:     Answer:  Regular [1]       MEDS:  Current facility-administered medications:    •  senna-docusate **AND** polyethylene glycol/lytes **AND** magnesium hydroxide **AND** bisacodyl  •  Ondansetron 4mg PO q4hrs PRN  •  prochlorperazine  •  zolpidem  •  Hydrocodone/acetaminophen (Norco): has not  taken  •  0.45% NaCl with KCl 20 mEq: currently not running      PE:  General: No acute distress, sleeping lying back in bed but awakens appropriately  HEENT: Normocephalic, atraumatic  Cardiovascular: RRR, normal S1/S2  Respiratory: Symmetric chest rise. CTAB, moving air well.  Abdomen: normoactive bowel sounds, non-distended, soft, non-tender, no masses, no rebound  Neuro: no gross focal deficits but had bilateral tremors in the hands    LABS:  Lab Results   Component Value Date    WBC 14.9* 08/09/2017    HEMOGLOBIN 14.3 08/09/2017    HEMATOCRIT 41.9* 08/09/2017    PLATELETCT 220 08/09/2017    MCV 96.5 08/09/2017     Lab Results   Component Value Date    SODIUM 141 08/09/2017    POTASSIUM 3.5* 08/09/2017    CHLORIDE 105 08/09/2017    CO2 28 08/09/2017    BUN 46* 08/09/2017    CREATININE 1.89* 08/09/2017    GLUCOSE 86 08/09/2017         CALCIUM  12.8*      IMAGING:  EKG (08/08/2017 @ 9:31PM)  - Sinus Tachycardia: 100-150 bpm  - Possible anterior wall infarction???    Head CT without contrast (08/08/2017 @ 3:31PM)  - Mild periventricular and subcortical white matter changes present.  This finding is nonspecific and could be from previous small vessel ischemia, demyelination, or gliosis.  - Focal hypodensity in the right basal ganglia may be related to a prominent Virchow-Mello (perivascular space) or a prior lacunar infarct.    Diagnostic CXR (08/08/2017 @ 7:51PM)  - Mild interstitial pulmonary edema  - BILATERAL perihilar atelectasis, less likely airspace disease    ASSESSMENT/PLAN:   ID: 61M who presents with debilitating back pain and abnormal results on CT and MRI imaging.    1. Metastatic Growths noted on CT and MRI  - CT Scan: results came back on 08/05/2017 with growth that was seen in the left upper lobe consistent with a neoplasm most likely due to primary lung cancer. Metastatic growths were also noted in the liver, bone, and maybe the adrenal glands.  - MRI Scan: per PCP, indicated metastatic growths in  the spine/back.  - Head CT without contrast: no definitive diagnosis of mets. Lacunar infarcts are present.  - CXR: bilateral perihilar atelectasis observed consistent with cancer of the lung.  - Obtain image guided biopsy of lung mass to determine/confrim presence of metastatic lung cancer.    2. Skin lesion  - Lesion was seen in the lower and middle parts of the lower abdomen.  - Lesions were asymmetric with irregular borders. The color varied on the lesion on the right side of the body with variable elevations.  - Conduct an excisional biopsy on the suspicious lesion to rule out or confirm melanoma.    3. Back Pain  - Pain that patient defined as sharp and shooting that radiates to the anterior abdominal wall upon movement.  - Patient went to chiropractor with no alleviation of pain.  - Patient also went to PCP who prescribed Norco10/325mg q4hrs for pain management.  - Maintain current pain medication regimen of Norco 10/325mg q4hrs for pain management.  - Assess level of care desired by patient after biopsy is performed and adjust pain medication accordingly.    4. Renal Insufficiency  - Patient labs demonstrated Cr level of 2.37 previously, Cr is going down to 1.89 today.  - Provide IV NS to reduce dehydration in patient and reassess kidney function at a later time if desired.  - Begin ACE inhibitor or ARB therapy in order to control HTN and protect kidney.    5. Hypercalcemia  - Patient labs indicated a critically high level of Ca at 13.5 (08/07/2017), now patients Ca is going down to 12.8.  - Stop lisinopril/HCTZ as thiazide diuretics may contribute to patient's hypercalcemia.    6. Hypertension  - Patient is currently on the border between normotensive and hypotensive  - Discontinue lisinopril/HCTZ in order to reduce chance of hypotensive episode.    7. Delirium/Restlessness  - Pt was harassing staff and threatened to leave while on medical hold.  - Pt was given Ativan to calm patient down so he could be  more cooperative  - Pt was also given nicoderm since smoking status is unclear (he says he doesn't smoke and wife says he still does)  - Patient has been exhibiting signs of alcohol withdrawal and has been put on a medical hold until appropriate measures can be taken for care.  - Wife of patient told the team today that he does drink 12 beers per day but stopped drinking the past 2-3 days prior to hospitalization.    FEN/Prophy/Core    - IVF: NS for rehydration  - Electrolytes: monitor over time  - Diet: regular, low Calcium diet if possible  - bowel regimen: miralax if needed to relieve constipation  - DVT prophy: none  - GI prophy: none  - Lines: PIV  - Tubes: None  - Code: DNR    Dispo: Inpatient for further workup and confirmation of possible lung cancer.
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
## 2017-08-10 NOTE — FLOWSHEET NOTE
08/10/17 1640   Events/Summary/Plan   Events/Summary/Plan ABG done from the right radial with help from RN to stabalize the arm.  Pressure on site x 5 minutes.  Pt. tolerated all well.     Blood Gas / Site draw   Blood Gas / Site draw  #R Radial   Allens Test Performed Yes   Site Pressure Held X 5 Min Yes

## 2017-08-10 NOTE — FLOWSHEET NOTE
08/10/17 1551   Type of Assessment   Assessment Yes   Patient History   Pulmonary Diagnosis Lung CA   Procedures Workup   Home O2 Use Prior To Admission? No   Home Treatments/Frequency No   COPD Risk Screening   Do you have a history of COPD? Yes   Do you have a Pulmonologist? Yes   COPD Population Screener   During the past 4 weeks, how much did you feel short of breath? 1   Do you ever cough up any mucus or phlegm? 1   In the past 12 months, you do less than you used to because of your breathing problems 1   Have you smoked at least 100 cigarettes in your entire life? 2   How old are you? 2   COPD Screening Score 7   COPD Coordinator Recommended Yes   Smoking History   Have you Ever Smoked Yes   Have you Smoked in the Last 12 Mos Yes   Have you Quit Smoking Yes   Confirm Quit Date 05/10/17   Smoking Pack Years 50+   Smoking Cessation Offered Patient Counseled   Level Of Consciousness   Level of Consciousness Confused   Respiratory WDL   Respiratory (WDL) X   Chest Exam   Work Of Breathing / Effort Mild   Respiration (!) 26   Pulse (!) 134   Breath Sounds   RUL Breath Sounds Diminished;Expiratory Wheezes   RML Breath Sounds Diminished   RLL Breath Sounds Diminished   CAROLYN Breath Sounds Diminished;Expiratory Wheezes   LLL Breath Sounds Diminished   Secretions   Cough (Pt. won't cough)   How Sputum Obtained Cough on Request   Sputum Amount Unable to Evaluate   Sputum Color Unable to Evaluate   Protocol Pathways   Protocol Pathways Yes   Incentive Spirometer Instruct   Predicted (ml) 3100   60% (ml) 1860   40% (ml) 1240   Actual (ml) (Pt. not able at this time.)   Bronchodilator Protocol   Med Order With RCP No   Is this an exacerbation of COPD/Asthma? No   Obstructive Ventilatory Defect or Pulmonary Disease without Obvious Obstruction History / Diagnosis   Breath Sounds Criteria 2    Benefit Criteria 0    Pulse Criteria 1    Respiratory Rate Criteria 2    S.O.B. Criteria 1    Criteria Total 6   Point Values 4-6 -  QID and PRN   Bronchodilator Goals/Outcome Diminished Wheezing and Volume of Air Movement Increased;Improved Vital Signs and Measures of Gas Exchange;Patient at Stable Baseline   Hyperinflation Protocol   Hyperinflation Protocol Indications Physical Exam   I.S. Greater than 60% of Predicted Breathing exercises QID x 24 hours then BID x 24 hours.  (Chest Trauma or Open Heart Surgery)   O2 Protocol   O2 (LPM) 0   Pulse Oximetry 95 %

## 2017-08-10 NOTE — CARE PLAN
Problem: Safety  Goal: Will remain free from injury  Outcome: PROGRESSING AS EXPECTED  Restraints in place with active order. Frequent monitoring and Q2 turns in progress. Bed locked and in lowest position.    Problem: Urinary Elimination:  Goal: Ability to reestablish a normal urinary elimination pattern will improve  Outcome: PROGRESSING SLOWER THAN EXPECTED  Pt continues to be confused and incontinent x2. Disposable deyanira in place, at least Q2 changing in progress.

## 2017-08-10 NOTE — PROGRESS NOTES
Biopsy to be done 8/11/17 per Dr. Holm. Spoke with Nurse Rayray pt to be NPO midnight for procedure 8//11/17 and will order PT/INR. roberto

## 2017-08-10 NOTE — PROGRESS NOTES
Called regarding elevated sodium 157; significantly increased from 141 24hrs prior.     No obvious free water losses, nursing reports good UOP, though only documenting number of times, not urine volume. Ordered strict Is/Os, urine electrolytes, repeat BMP 6 hours from last.     If continued or worsening hypernatremia, would consider treatment with D5 at 3-6mg/kg/hr with repeat sodium levels q1-2 hours.

## 2017-08-10 NOTE — PROGRESS NOTES
"[facility] [place] [facility] PROGRESS NOTE     Attending: Dr. Beach  Resident: Dr. Gallegos    PATIENT: Victor Manuel Glasgow; [id number]; 1955; Hospital Day: 4    ID: 61 y.o. male admitted for finding of metastatic masses on CT, hypercalcemia    SUBJECTIVE: Patient has been increasingly altered. He is unable to verbalize complaints today but does cry out when moved and when abd is palpated.     PM events: patient developed severe acute hypernatremia as well as worsening hypercalcemia and is being transferred to ICU    OBJECTIVE: /95 mmHg  Pulse 76  Temp(Src) 36.6 °C (97.8 °F)  Resp 17  Ht 1.829 m (6')  Wt 71.215 kg (157 lb)  BMI 21.29 kg/m2  SpO2 95%  Filed Vitals:    08/09/17 1600 08/09/17 1930 08/10/17 0410 08/10/17 0436   BP: 131/90 108/69 134/95    Pulse: 95 125 134 76   Temp: 37 °C (98.6 °F) 36.6 °C (97.8 °F) 36.6 °C (97.8 °F)    Resp: 18 17 17    Height:       Weight:       SpO2: 94% 92% 95%    Pulse Oximetry: 95 %, O2 (LPM): 0, O2 Delivery: None (Room Air)      MEDS:    Current facility-administered medications:   •  potassium phosphate ivpb  •  0.45% NaCl with KCl 20 mEq  •  hydrocodone/acetaminophen  •  alcoholic beverage  •  lorazepam **OR** lorazepam **OR** lorazepam **OR** lorazepam **OR** lorazepam  •  thiamine  •  multivitamin  •  lorazepam  •  lorazepam  •  nicotine  •  haloperidol lactate  •  senna-docusate **AND** polyethylene glycol/lytes **AND** magnesium hydroxide **AND** bisacodyl  •  ondansetron  •  ondansetron  •  promethazine  •  promethazine  •  prochlorperazine  •  zolpidem    PE:  General: in no acute distress, lying back on bed staring and intermittently snoring. Does arouse to voice but can only interact by saying \"yeah\" then falls asleep again. Doesn't answer questions.   HEENT: Normocephalic, atraumatic. Mucous membranes very dry. Poor dentition.  Cardiovascular: RRR, normal S1/S2, no extra heart sounds appreciated  Respiratory: Symmetric chest rise. Moving air reasonably well, sonorous " sounds throughout  Abdomen: normoactive bowel sounds, non-distended, soft, moderately tenderness diffusely, no masses, no rebound  EXT: LAIRD, No C/C/E  Neuro: no gross focal deficits, moving all extremities, withdraws to pain    LABS:  Lab Results   Component Value Date    WBC 13.5* 08/10/2017    HEMOGLOBIN 16.2 08/10/2017    HEMATOCRIT 52.8* 08/10/2017    PLATELETCT 150* 08/10/2017    .4* 08/10/2017     Lab Results   Component Value Date    SODIUM 157* 08/10/2017    POTASSIUM 3.8 08/10/2017    CHLORIDE 124* 08/10/2017    CO2 20 08/10/2017    BUN 34* 08/10/2017    CREATININE 1.52* 08/10/2017    GLUCOSE 97 08/10/2017     No results for input(s): POCGLUCOSE in the last 72 hours.    MICROBIOLOGY:  Recent Labs      08/08/17   1043   BLOODCULTU  No Growth    Note: Blood cultures are incubated for 5 days and  are monitored continuously.Positive blood cultures  are called to the RN and reported as soon as  they are identified.    No Growth    Note: Blood cultures are incubated for 5 days and  are monitored continuously.Positive blood cultures  are called to the RN and reported as soon as  they are identified.          ASSESSMENT/PLAN:  Victor Manuel Glasgow is a 61 y.o. male with metastatic cancer of unknown primary, likely lung now with altered mental status, hypercalcemia, and severe hypernatremia.    Metastatic disease  - CT Scan 8/05/2017 with growth that was seen in the left upper lobe, likely primary lung cancer. Mets were noted in liver, bone, and maybe adrenal glands. New diagnosis.  - alk phos and calcium elevated due to bony involvement  - 30lb weight loss and poor appetite  - head CT negative for intracranial mets  - Ddx: metastatic lung cancer, prostate cancer, or melanoma. Has not had screening tests.              - will plan for biopsy and then oncology involvement. Discussed with IR today, planning for CT guided bx of one of the bony lesions. Unstable for bx today since he will have to tolerate being on  his stomach for procedure.    Altered mental status  - altered mentation worsening. Now mostly somnolent. Received only 5.5mg ativan in last 24 hours.  - concern for alcohol withdrawal, vs metabolic or wernickes encephalopathy, vs intracranial mets  - no intracranial process on CT head without contrast  - workup for sepsis unremarkable (UA, blood cultures, CXR)  - not hypercapneic on ABG  - unsafe to make decision to leave AMA. Per wife may use restraints if needed   - address underlying causes as below    Alcoholism, alcohol withdrawal  - on admission stated 2-3 beers nightly, down from 6 pack, but per wife pt drinks 12 pack daily  - agitation and wanting to leave AMS started 14 hours after admission (and presumably his last drink)  - tachycardia started about 48 hours after last drink   - CIWA scores 7-12 for tremor, etc   - starting phenobarb protocol in ICU              - will give rally bag   - anticipate that tachycardia and relative hypertension will improve with this regimen    Hypernatremia  - acute onset, from 132 on admit to 141 yesterday to 163 today  - uncertain if he is symptomatic as altered mental status was present before  - urine lytes show very dilute urine, concern for diabetes insipidus (central, from likely brain mass)  - nephrology consult placed, appreciate recs   - D5 in free water started 2pm at a rate of 125mL/hr   - consider using DDAVP   - monitor sodium every 3 hours    Hypercalcemia  - Ca at 13.9 on outpatient labs, down to 12.8, today up to 14.0  - nephrology consult placed as above   - give pamidronate now, takes 2 days to effectiveness   - give calcitonin now per their protocol   - continue to hydrate with IV fluids    Leukocytosis  - WBC up to 16 during admission, down to 13 and then back up to 16 today  - unclear etiology. Vitals not consistent with sepsis. No clinical infections. Lactate normalized with fluids.  - UA wnl, blood cultures NGTD, CXR unremarkable   - continue to  monitor clinically for infection    Abd pain  - new tenderness on exam today but without rebound or peritoneal signs  - likely constipation, no BM in 4 days   - will pass NG tube and start free water, tube feeds, and milk of magnesia or lactulose    Renal Insufficiency, resolved  - Patient labs demonstrated Cr level of 2.6, today improved to 1.3  - unclear chronic or acute. Baseline unknown, but PCP notes do not mention chronic kidney disease              - continue hydration as above    Stable, chronic problems  HTN: normotensive today, hold lisinopril/HCTZ  HLD: stable, cont home statin at discharge     FEN/Prophy/Core    - IVF: D5 as above  - Electrolytes: hypercalcemia and hypernatremia  - Diet: NPO, starting tube feeds  - bowel regimen: will start lactulose or milk of magnesium  - DVT prophy: heparin subQ  - GI prophy: none  - Lines: multiple PIV  - Tubes: None, will place NG tube and larios  - Code: DNR/DNI, discussed with wife at bedside today    Dispo: Inpatient for further workup of likely metastatic lung cancer, hypercalcemia, hypernatremia

## 2017-08-10 NOTE — PROGRESS NOTES
Report received. Assumed care of pt. A/O x1. Oriented to self only, on CIWA protocol. Pt. Anxious and agitated, Assessment complete, vest restraints on, q2 checks in place. Call light and belongings within reach. Bed alarm on. Bed in the lowest position. Treaded socks in place. Hourly rounding in progress. Will continue to monitor .

## 2017-08-10 NOTE — PROGRESS NOTES
Joanna from Lab called with critical result of Na at 157 and Ca+ at 13.6. Critical lab result read back to Joanna.   Dr. Lorena Sotomayor notified of critical lab result at 0400.  Critical lab result read back by Dr. Sotomayor. New orders received.

## 2017-08-10 NOTE — PROGRESS NOTES
Report given to ANTOINE De La Cruz at bedside for ICU transfer. All patient belongings brought with patient and family at bedside as well. No farther questions or concerns..

## 2017-08-11 NOTE — CARE PLAN
Problem: Communication  Goal: The ability to communicate needs accurately and effectively will improve  Outcome: PROGRESSING SLOWER THAN EXPECTED  Pt is not communicating at this time. At the most, patient moans or screams. Pt will not answer questions or follow commands.    Problem: Venous Thromboembolism (VTW)/Deep Vein Thrombosis (DVT) Prevention:  Goal: Patient will participate in Venous Thrombosis (VTE)/Deep Vein Thrombosis (DVT)Prevention Measures  Outcome: PROGRESSING AS EXPECTED  Pt compliant with VTE orders and protocols at this time.

## 2017-08-11 NOTE — DIETARY
Nutrition Support Assessment - Male    Victor Manuel Glasgow is a 61 y.o. male with admitting DX of hypercalcemia,Lung cancer metastatic to bone, Renal insufficiency, Diabetes Insipidus  Pertinent History: ETOH abuse, hypertension, hyperlipidemia    Height: 182.9 cm (6')  Weight: 70.5 kg (155 lb 6.8 oz)  Ideal Body Weight: 80.74 kg (178 lb)  Percent Ideal Body Weight: 88.2  BMI:  21    Last BM:  (PTA)    Pertinent Labs: Na 159, am glucose 161, BUN 28, Creatinine 1.41  Pertinent Medications: Unasyn, Calcitonin, Desmopressin, Thiamine, Folic Acid, Multivitamin  Pertinent Fluids: D5W 200 ml/hr; free water flushes of 300 ml q 4 hours to start   Surgery / Procedures: Cortrak placed today     Estimated Needs: (DTG=8443)  Total Calories / day: 0079-6750 (Calories / k-28)  Total Grams Protein / day: 78- 92  (Grams Protein / k.1 - 1.3)  Total Fluids ml / day: 1765 ml         Assessment / Evaluation: Tube feeds appropriate as patient is not alert enough for PO intake. Renal labs improving.    Plan / Recommendation: Once Cortrak tube placement verified, Start Diabetisource @ 25 ml/hr.  Advance per protocol to goal rate of 65 ml/hr.  This will provide 1872 kcal/d, 1279 ml/d free water and 94 gm protein per day.  Fluids per MD. SUAREZ following.

## 2017-08-11 NOTE — RESPIRATORY CARE
COPD EDUCATION by COPD CLINICAL EDUCATOR  8/11/2017 at 7:38 AM by Radha Ye     Patient reviewed by COPD education team. Patient does not qualify for COPD program.

## 2017-08-11 NOTE — PROGRESS NOTES
Per Nurse De La Cruz the CT Iliac Biopsy will be rescheduled to possibly 8/14/17 as the patient is not stable enough for procedure. [email or web address]

## 2017-08-11 NOTE — PROGRESS NOTES
Pulmonary Critical Care Progress Note      Date of service:  8/11/2017      Interval Events:  24 hour interval history reviewed  Reason for visit:  Aspiration pneumonia, alcohol withdrawal delirium  Unable to provide CC or ROS due to medical condition       - ST   - phenobarb protocol   - start TF   - CXR with increased left perihilar   - calcitonin   - pamidronate   - DDAVP      PFSH:  No change.    Respiratory:     Pulse Oximetry: 100 %  CXR personally reviewed  CXR with slightly increased left perihilar opacification  Coarse crackles bilaterally - improved    Recent Labs      08/10/17   1644   ISTATAPH  7.333*   ISTATAPCO2  54.2*   ISTATAPO2  18*   ISTATATCO2  30   NCYNXAD2DTK  23*   ISTATARTHCO3  28.8*   ISTATARTBE  2   ISTATTEMP  98.2 F   ISTATFIO2  21   ISTATSPEC  Arterial   ISTATAPHTC  7.336*   [id number]  18*       HemoDynamics:  Pulse: (!) 130, Heart Rate (Monitored): (!) 130  Blood Pressure: 129/90 mmHg, NIBP: 115/68 mmHg          Recent Labs      08/10/17   1255   CPKTOTAL  35       Neuro:  Sedated, becomes agitated and aggressive at times  Phenobarbital protocol    Fluids:  Intake/Output       08/09/17 0700 - 08/10/17 0659 08/10/17 0700 - 08/11/17 0659 08/11/17 0700 - 08/12/17 0659      8117-1915 9674-7526 Total 8415-4843 4934-7563 Total 5769-5970 4865-8018 Total       Intake    P.O.  220  -- 220  0  0 0  --  -- --    P.O. 220 -- 220 0 0 0 -- -- --    I.V.  --  -- --  1333.2  2450 3783.2  --  -- --    IV Volume (D5W) -- -- -- 500 2150 2650 -- -- --    IV Volume (Postassium Phos) -- -- -- 333.2 -- 333.2 -- -- --    IV Piggyback Volume (Pamidronate) -- -- -- 500 -- 500 -- -- --    IV Piggyback Volume -- -- -- -- 300 300 -- -- --    Total Intake 220 -- 220 1333.2 2450 3783.2 -- -- --       Output    Urine  --  -- --  600  1525 2125  --  -- --    Condom Catheter -- -- -- 600 1525 2125 -- -- --    Number of Times Voided -- -- -- 3 x -- 3 x -- -- --    Number of Times Incontinent of Urine 5 x 5 x 10 x -- --  -- -- -- --    Stool  --  -- --  --  -- --  --  -- --    Number of Times Stooled 0 x 0 x 0 x -- 0 x 0 x -- -- --    Total Output -- -- -- 600 1525 2125 -- -- --       Net I/O     220 -- 220 733.2 925 1658.2 -- -- --        Weight: 70.5 kg (155 lb 6.8 oz)  Recent Labs      08/10/17   0215  08/10/17   0926  08/10/17   1255  08/11/17   0033  17   SODIUM  157*  163*  160*  161*  159*   POTASSIUM  3.8  3.5*  3.6   --   3.8   CHLORIDE  124*  130*  131*   --   126*   CO2  20  23  20   --   25   BUN  34*  36*  37*   --   28*   CREATININE  1.52*  1.45*  1.59*   --   1.41*   MAGNESIUM   --    --   2.2   --   1.8   PHOSPHORUS  2.1*   --    --    --   2.6   CALCIUM  13.6*  14.0*  13.5*   --   11.2*       GI/Nutrition:  Abd soft ND/NT    Liver Function  Recent Labs      08/10/17   0926  08/10/17   1255  08/11/17   0519   ALTSGPT   --   15  6   ASTSGOT   --   17  10*   ALKPHOSPHAT   --   285*  248*   TBILIRUBIN   --   0.5  0.4   LIPASE   --   14   --    GLUCOSE  101*  112*  161*       Heme:  Recent Labs      08/10/17   0215  08/10/17   1255  08/11/17   0519   RBC  5.01  4.94  3.96*   HEMOGLOBIN  16.2  15.8  13.0*   HEMATOCRIT  52.8*  48.1  39.4*   PLATELETCT  150*  206  154*       Infectious Disease:  Temp  Av.9 °C (98.5 °F)  Min: 36.7 °C (98 °F)  Max: 37.4 °C (99.3 °F)  Monitored Temp  Av.5 °C (99.5 °F)  Min: 37.2 °C (99 °F)  Max: 37.8 °C (100 °F)    Recent Labs      17   0817   0252  08/10/17   0215  08/10/17   1255  17   0519   WBC  16.8*  14.9*  13.5*  16.8*  15.9*   NEUTSPOLYS  82.80*  75.50*   --   85.80*   --    LYMPHOCYTES  6.10*  10.30*   --   7.10*   --    MONOCYTES  9.00  9.00   --   6.20   --    EOSINOPHILS  1.00  3.80   --   0.90   --    BASOPHILS  0.20  0.30   --   0.00   --    ASTSGOT   --    --    --   17  10*   ALTSGPT   --    --    --   15  6   ALKPHOSPHAT   --    --    --   285*  248*   TBILIRUBIN   --    --    --   0.5  0.4     Current Facility-Administered  Medications   Medication Dose Frequency Provider Last Rate Last Dose   • thiamine (THIAMINE) tablet 100 mg  100 mg DAILY Stacy Gallegos M.D.        And   • folic acid (FOLVITE) tablet 1 mg  1 mg DAILY Stacy Gallegos M.D.        And   • multivitamin (THERAGRAN) tablet 1 Tab  1 Tab DAILY Stacy Gallegos M.D.       • heparin injection 5,000 Units  5,000 Units Q8HRS Stacy Gallegos M.D.   5,000 Units at 08/11/17 0525   • D5W infusion   Continuous Jessika Carlson M.D. 200 mL/hr at 08/10/17 1808     • Respiratory Care per Protocol   Continuous RT Stacy Gallegos M.D.       • MD ALERT...Phenobarbital Alcohol Withdrawal Protocol Pharmacist to Implement (ICU Only)   PRN Ananda Jo M.D.       • Pharmacy Consult Request 1 Each  1 Each PRN Gurpreet Penaloza PHARMD        And   • PHENObarbital injection 130 mg  130 mg Q30 MIN PRN Gurpreetglory Penaloza PHARMD   130 mg at 08/10/17 2110    And   • PHENObarbital injection 260 mg  260 mg Q30 MIN PRN Gurpreet Penaloza PHARMD       • ampicillin/sulbactam (UNASYN) 3 g in  mL IVPB  3 g Q6HRS Ananda Jo M.D.   Stopped at 08/11/17 0555   • ipratropium-albuterol (DUONEB) nebulizer solution 3 mL  3 mL Q4H PRN (RT) Ananda Jo M.D.       • calcitonin (MIACALCIN) 200 UNIT/ML injection 280 Units  4 Units/kg Q8HR Jessika Carlson M.D.   280 Units at 08/11/17 0525   • ipratropium-albuterol (DUONEB) nebulizer solution 3 mL  3 mL 4X/DAY (RT) Ananda Jo M.D.   3 mL at 08/11/17 0646   • hydrocodone/acetaminophen (NORCO)  MG per tablet 1 Tab  1 Tab Q3HRS PRN Stacy Gallegos M.D.   1 Tab at 08/09/17 1510   • nicotine (NICODERM) 21 MG/24HR 21 mg  21 mg Daily-0600 Stacy Gallegos M.D.   21 mg at 08/11/17 0525   • ondansetron (ZOFRAN) syringe/vial injection 4 mg  4 mg Q4HRS PRN Stacy Gallegos M.D.       • ondansetron (ZOFRAN ODT) dispertab 4 mg  4 mg Q4HRS PRN Stacy Gallegos M.D.   4 mg at 08/07/17 2014   • promethazine (PHENERGAN) tablet 12.5-25 mg   12.5-25 mg Q4HRS PRN Stacy Gallegos M.D.       • promethazine (PHENERGAN) suppository 12.5-25 mg  12.5-25 mg Q4HRS PRN Stacy Gallegos M.D.       • prochlorperazine (COMPAZINE) injection 5-10 mg  5-10 mg Q4HRS PRN Stacy Gallegos M.D.         Last reviewed on 8/7/2017  6:55 PM by Nnamdi Wilson    Quality  Measures:  Labs reviewed, Medications reviewed and Radiology images reviewed                        Assessment and Plan:    Acute unspecified encephalopathy   - toxic metabolic encephalopathy due to alcohol withdrawal delirium  Alcohol withdrawal delirium   - phenobarbital protocol  Aspiration pneumonia   - cont Unasyn  Left hilar mass highly suspicious for bronchogenic carcinoma with evidence of liver and bony metastases   - tissue diagnosis when improved  Hypercalcemia due to bony metastases    - improved with hydration, calcitonin   - pamidronate  Acute kidney injury - improved with hydration  Hypernatremia - consistent with central diabetes insipidus   - cont IV D5W   - free water   - DDAVP  Back pain due to osseous metastases  Systemic arterial hypertension   - cont BP control  Dyslipidemia  Tobacco abuse  Alcohol abuse   - vitamins    Critically ill.  High risk for deterioration and worsening vital organ dysfunction.  Keep in ICU.    Critical Care Time:  33 minutes  [id number]  No time overlap  Time excludes procedures  Discussed with RN, RT, Team

## 2017-08-11 NOTE — CARE PLAN
Problem: Bronchoconstriction:  Goal: Improve in air movement and diminished wheezing  Outcome: PROGRESSING AS EXPECTED  No wheezing noted throughout shift  Intervention: Implement inhaled treatments  QID Duoneb

## 2017-08-11 NOTE — CONSULTS
[facility] Nephrology Consult Note    Patient seen and examined, please see my dictated consult note for full details  61yoM with PMH significant for HTN, Hyperlipidemia, EtOH abuse, admitted with severe back pain and CAROLYN Lung mass with suspected metastases and now noted to have acute hypernatremia and mental status changes.  Assessment/Plan:  1. Hypernatremia--acute within last 24hrs, suspicious for DI in the setting of cognitive impairment, pt with low urine osm and low urinary Na and high serum osm. ?Central DI due to malignancy vs ?Nephrogenic DI related to hypercalcemia  --DC NS and change fluids to D5W at 125cc/hr  --Re-check serum Na in  3hrs and if still elevated will increase free water rate  --Gramajo catheter to be placed  --Will give one dose DDAVP and re-check serum Na and urine osm  --Aggressive IVF hydration  --Monitor  2. JOSE L--Cr elevated on admission, no prior baseline, now improved with IVF's, likely related to hypercalcemia and pre-renal factors  --Aggressive IVF hydration with free water  --Monitor closely in the setting of bisphosphonate administration  3. Altered Mental Status--likely some component related to acute hypernatremia but ?EtOH withdrawal  --[facility] protocol per primary svc  --Correct hypernatremia  --Consider checking ammonia level  4. Hypercalcemia--likely related to malignancy  --Continue IVF's  --Pamidronate ordered  --Will order calcitonin (should be stopped in 48hrs once pamidronate takes effect)  --Monitor  5. Left Lung Mass--suspicious for malignancy given liver and bony mets  --Supportive care  6. Leukocytosis--suspected aspiration pneumonia  --Pulm/Critical Care following  --Monitor  7. HTN--Unable to take PO  --PRN IV labetalol and IV hydralazine  --[facility] protocol  --Monitor      Report Confirmation #[id number]

## 2017-08-11 NOTE — CONSULTS
DATE OF SERVICE:  08/10/2017    REQUESTING PHYSICIAN:  Marquise Beach MD.    CONSULTING PHYSICIAN:  Ananda Beltran MD    TYPE OF CONSULTATION:  Pulmonary medicine and critical care medicine.    REASON FOR CONSULTATION:  Evaluation and management of worsening delirium and   assist with critical care management.    CHIEF COMPLAINT:  The patient cannot provide.    HISTORY OF PRESENT ILLNESS:  The entire history is obtained from healthcare   providers and medical record as this gentleman cannot give me a history.  I   was kindly asked by Dr. Beach to see and evaluate this gentleman regarding   the above problems.  This is a 61-year-old gentleman who was admitted to   [facility] on or about 08/07/2017.  He presented with back   pain and a 30-pound weight loss.  Imaging revealed evidence of a left hilar   mass with small metastases in the lungs as well as multiple metastases   in his liver, bones and possible adrenal glands.  He was found to have   significant hypercalcemia with a serum calcium of 13.5.  The patient's   admitting serum sodium was 132.  He was admitted to the hospital.  He was   hydrated with intravenous crystalloid solution.  He has a history of alcohol   abuse and apparently consumes a 12-pack of beer a day.  He has been developing   worsening confusion and agitation and has been combative.  Today, he was   being placed on the CIWA protocol and has been receiving significant amounts   of lorazepam.  Today, he has been transferred to the intensive care unit   because of worsening delirium, tachycardia and suspected polyuria with a   significant increase in his serum sodium.  This morning, his serum sodium was   as high as 163.    At this time, this gentleman is in the intensive care unit and cannot give me   a reliable history.    CURRENT MEDICATIONS:  He is on heparin 5000 units every 8 hours, nicotine   patch 21 mg a day.  He is on the CIWA protocol, receiving lorazepam.   He is on   thiamine, folate, and multivitamins.    ALLERGIES:  No known drug allergies.    PAST SURGICAL HISTORY:  Appendectomy.    ILLNESSES:  Systemic arterial hypertension, dyslipidemia, erectile   dysfunction.    SOCIAL HISTORY:  He apparently has a 50-pack-year history of smoking.  He   abuses alcohol and drinks a 12-pack of beer a day.    FAMILY HISTORY:  Noncontributory.    REVIEW OF SYSTEMS:  Not obtainable due to his medical state.    PHYSICAL EXAMINATION:  VITAL SIGNS:  His temperature is 98.5, his blood pressure is 145/96, his heart   rate is 137 and his respiratory rate is 28.  GENERAL:  He is tachycardic and tachypneic gentleman.  HEENT:  Normocephalic, atraumatic.  Sinuses are nontender.  Nares patent.    Oropharynx with dry mucous membranes.  NECK:  Trachea midline, supple.  CHEST:  Symmetrical.  HEART:  Tachycardic, regular rhythm.  LUNGS:  He is tachypneic.  There are very few coarse crackles bilaterally.    There is no wheezing.  There is good air movement.  ABDOMEN:  Soft, nondistended, mildly tender in the lower abdomen, but no   peritoneal signs.  EXTREMITIES:  No clubbing, cyanosis or edema.  NEUROLOGIC:  He will arouse.  He is confused.  He moves all extremities.  He   resists care and becomes combative at times.    DIAGNOSTIC DATA:  His white blood cell count is 16,800, hemoglobin 15.8,   hematocrit 48.1, platelet count 206,000.  Sodium 160, potassium 3.6, chloride   131, CO2 20, BUN 37, creatinine 1.59, glucose 112.  Calcium is 13.5, alkaline   phosphatase is 285, serum osmolality is 349.  Urine chemistries show sodium   less than 10 with a urine osmolality of 199.  His urinalysis only has 0-2   white blood cells per high powered field.  His urine specific gravity is   1.007.  Blood cultures are negative.  CT scan of the head shows no acute   pathology.  There is atrophy.  His chest x-ray shows slightly increased   opacification at the right lung base as well as in the left perihilar  region.    IMPRESSION:  1.  Acute unspecified encephalopathy.  I suspect that he has toxic metabolic   encephalopathy due to alcohol withdrawal delirium.  He may also be   encephalopathic from hypercalcemia.  2.  Probable aspiration pneumonia.  This gentleman has altered mental status   and likely has aspirated.  His chest x-ray is abnormal.  3.  Left hilar mass highly suspicious for bronchogenic carcinoma with evidence   of liver and bony metastases, a tissue diagnosis has not been obtained.  4.  Hypercalcemia due to bony metastases.  5.  Acute kidney injury.  6.  Hypernatremia.  We do not have the precise inputs and outputs, it is   unclear if he has polyuria.  7.  Leukocytosis, suspect secondary to aspiration pneumonia.  8.  Hypokalemia.  9.  Back pain due to osseous metastases.  10.  Systemic arterial hypertension.  11.  Dyslipidemia.  12.  Tobacco abuse.  13.  Alcohol abuse.    PLAN AND MEDICAL DECISION MAKING:  This gentleman is critically ill.  He has   been appropriately transferred to the intensive care unit.  I am going to stop   his CIWA protocol and benzodiazepines and place him on the phenobarbital   alcohol withdrawal protocol.  He is going to be hydrated with intravenous D5W,   we will follow his electrolytes very closely.  A Gramajo catheter will be   placed.  This is medically necessary for strict inputs and outputs as he is   critically ill.  We will follow with sodium and renal function very closely.    He is going to receive pamidronate.  Additionally, he will receive   calcitonin per Dr. Carlson from nephrology.  She is consulting at this   time.  His potassium will be carefully repleted.  We will culture his sputum   if he produces any.  I am going to empirically place him on   ampicillin/sulbactam for aspiration pneumonia.  He is going to continue on his   supplemental vitamins including thiamine, folate, and multivitamins.    At the current time, this gentleman's prognosis is quite guarded.   He is at   very high risk for further deterioration and worsening vital organ   dysfunction.  He is critically ill.  I have spent 93 minutes providing direct   critical care services at the bedside.  There has been no time overlap.  The   time spent excludes the time spent performing procedures ([id number], [id number]).    The case has been reviewed with the [facility] practice residents at the bedside,   nursing, and respiratory therapy and Dr. Humphrey.    Thank you Dr. Beach for allowing us to participate in the care of this   gentleman.  We will continue to follow him with great interest.       ____________________________________     MD BERNA ALEXANDER / ISAAC    DD:  08/10/2017 17:14:59  DT:  08/10/2017 17:35:59    D#:  [id number]  Job#:  [id number]    cc: KRYSTYNA BEACH MD, ELAN HUMPHREY MD

## 2017-08-11 NOTE — PROGRESS NOTES
Cortrak Placement    Tube Team verified patient name and medical record number prior to tube placement.  Cortrak tube (55 inches, 10 Angolan) placed at 62 cm in left nare.  Per Cortrak picture, tube appears to be in the stomach.  Nursing Instructions: Awaiting KUB to confirm placement before use for medications or feeding. Once placement confirmed, flush tube with 30 ml of water, and then remove and save stylet, in patient medication drawer.

## 2017-08-11 NOTE — FLOWSHEET NOTE
08/10/17 1724   Events/Summary/Plan   Events/Summary/Plan Neb txs started via mask.  Pt. not very cooperative.  No respiratory distress at this time.   Interdisciplinary Plan of Care-Goals (Indications)   Obstructive Ventilatory Defect or Pulmonary Disease without Obvious Obstruction History / Diagnosis;Physical Exam / Hyperinflation / Wheezing (bronchospasm)   Interdisciplinary Plan of Care-Outcomes    Bronchodilator Outcome Diminished Wheezing and Volume of Air Movement Increased;Improved Vital Signs and Measures of Gas Exchange   Education   Education Yes - Pt. / Family has been Instructed in use of Respiratory Equipment;Yes - Pt. / Family has been Instructed in use of Respiratory Medications and Adverse Reactions   RT Assessment of Delivered Medications   Evaluation of Medication Delivery Daily Yes-- Pt /Family has been Instructed in use of Respiratory Medications and Adverse Reactions   SVN Group   #SVN Performed Yes   Given By: Mask   Date SVN Last Changed 08/10/17   Date SVN Next Change Due (Q 7 Days) 08/17/17   Respiratory WDL   Respiratory (WDL) X   Chest Exam   Work Of Breathing / Effort Mild;Moderate   Respiration (!) 30   Pulse (!) 132   Heart Rate (Monitored) (!) 134   Breath Sounds   Pre/Post Intervention Pre Intervention Assessment   RUL Breath Sounds Diminished;Expiratory Wheezes   RML Breath Sounds Diminished   RLL Breath Sounds Diminished   CAROLYN Breath Sounds Diminished;Fine Crackles   LLL Breath Sounds Diminished   Secretions   Cough Moist;Non Productive   How Sputum Obtained Spontaneous   Sputum Amount Unable to Evaluate   Sputum Color Unable to Evaluate   Sputum Consistency Unable to Evaluate   Oximetry   Continuous Oximetry Yes   O2 Alarms Set & Reviewed Yes   Oxygen   Pulse Oximetry 96 %   O2 (LPM) 0   O2 Daily Delivery Respiratory  Room Air with O2 Available

## 2017-08-11 NOTE — PROGRESS NOTES
[facility] [place] [facility] [place]GRESS [place]     Attending: Dr. Beach  Resident: Dr. Gallegos    PATIENT: Victor Manuel Glasgow; [id number]; 1955; Hospital Day: 5    ID: 61 y.o. male admitted for finding of metastatic masses on CT, hypercalcemia, now transferred to ICU for altered mental status and acute hyperkalemia    SUBJECTIVE: No acute events overnight. Patient continues altered, wakes to voice but can only moan. Falls back asleep immediately. He continues to cry out in pain when moved.    OBJECTIVE: /90 mmHg  Pulse 129  Temp(Src) 37.4 °C (99.3 °F)  Resp 33  Ht 1.829 m (6')  Wt 70.5 kg (155 lb 6.8 oz)  BMI 21.07 kg/m2  SpO2 100%  Filed Vitals:    08/11/17 0100 08/11/17 0200 08/11/17 0300 08/11/17 0400   BP:       Pulse: 132 133 128 129   Temp:       Resp: 37 41 30 33   Height:       Weight:       SpO2: 100% 100% 100% 100%   Pulse Oximetry: 100 %, O2 (LPM): 2, O2 Delivery: Silicone Nasal Cannula      Intake/Output Summary (Last 24 hours) at 08/11/17 0620  Last data filed at 08/11/17 0400   Gross per 24 hour   Intake 3383.2 ml   Output   1875 ml   Net 1508.2 ml      DIET:   Orders Placed This Encounter   Procedures   • DIET NPO     Standing Status: Standing      Number of Occurrences: 1      Standing Expiration Date:      Order Specific Question:  Restrict to:     Answer:  Strict [1]       MEDS:    Current facility-administered medications:   •  [COMPLETED] Rally Bag infusion **AND** thiamine **AND** folic acid **AND** multivitamin  •  heparin  •  D5W  •  Respiratory Care per Protocol  •  MD ALERT...Phenobarbital Alcohol Withdrawal Protocol Pharmacist to Implement (ICU Only)  •  Pharmacy **AND** If RASS 1 to 4: assess RASS every 30 minutes until RASS returns to goal sedation (RASS -2 to 0) **AND** If RASS -2 to 0: assess RASS every 30 minutes for 2 occurrences and then every 4 hours **AND** If RASS -5 to -3: assess RASS, BP, and RR every 15 minutes with Continuous Pulse Oximetry until RASS returns to goal sedation  (RASS -2 to 0) **AND** Pulse ox (oximetry) **AND** PHENObarbital **AND** PHENObarbital  •  ampicillin-sulbactam (UNASYN) IV  •  ipratropium-albuterol  •  calcitonin (MIACALCIN) injection  •  ipratropium-albuterol  •  hydrocodone/acetaminophen  •  nicotine  •  ondansetron  •  ondansetron  •  promethazine  •  promethazine  •  prochlorperazine    PE:  General: No acute distress, lying back in bed with NG tube and O2 in place. Responds to voice by opening eyes and moaning. Withdraws to pain.  HEENT: Normocephalic, atraumatic. Mucous membranes dry. Very poor dentition  Cardiovascular: RRR, normal S1/S2, no extra heart sounds appreciated  Respiratory: Symmetric chest rise. Somewhat labored breathing. Minimal wheezing limited to Left upper lobe, rhonchi throughout  Abdomen: normoactive bowel sounds, non-distended, soft, non-tender, no masses, no rebound  EXT: LAIRD, No C/C/E  Neuro: no gross focal deficits, moving all extremties    LABS:  Lab Results   Component Value Date    WBC 15.9* 2017    HEMOGLOBIN 13.0* 2017    HEMATOCRIT 39.4* 2017    PLATELETCT 154* 2017    MCV 99.5* 2017     Lab Results   Component Value Date    SODIUM 158* 2017    POTASSIUM 3.9 2017    CHLORIDE 122* 2017    CO2 28 2017    BUN 26* 2017    CREATININE 1.35 2017    GLUCOSE 164* 2017     Recent Labs      17   0048   POCGLUCOSE  161*       MICROBIOLOGY:  Recent Labs      17   1043   BLOODCULTU  No Growth    Note: Blood cultures are incubated for 5 days and  are monitored continuously.Positive blood cultures  are called to the RN and reported as soon as  they are identified.    No Growth    Note: Blood cultures are incubated for 5 days and  are monitored continuously.Positive blood cultures  are called to the RN and reported as soon as  they are identified.          IMAGIN/11 CXR Cardiomediastinal contour is unchanged.  Previously described pulmonary parenchymal  opacities persist.  No pneumothorax identified.    8/11 KUB  Enteric tube has been placed.  The tip projects over the stomach.  The bowel gas pattern is within normal limits.  Airspace opacities are unchanged.    ASSESSMENT/PLAN:  Victor Manuel Glasgow is a 61 y.o. male admitted with metastatic cancer of unknown primary, likely lung, now with altered mental status, hypercalcemia, and severe hypernatremia.       Neuro    Altered mental status, Alcoholism, alcohol withdrawal  - altered mentation stable, mostly somnolent  - on admission stated 2-3 beers nightly, down from 6 pack, but per wife pt drinks 12 pack daily  - more likely alcohol withdrawal, vs metabolic or wernickes encephalopathy, vs intracranial mets  - agitation and wanting to leave AMS started 14 hours after admission (and presumably his last drink)  - tachycardia started about 48 hours after last drink  - no intracranial process on CT head without contrast  - workup for sepsis unremarkable (UA, blood cultures, CXR)  - not hypercapneic on ABG              - continue phenobarb by protocol in ICU              - anticipate that tachycardia and relative hypertension will improve with this regimen      Cardiac    No active issues: sinus tachycardia as above      Respiratory/Infectious Disease     Aspiration pneumonia  - WBC up to 16 during admission, down to 13 and then back up to 16 yesterday, likely aspirated  - UA wnl, blood cultures NGTD  - CXR with perihilar infiltrates   - continue unasyn started 8/10 by pulm    Tobacco user  - wheezing noted on exam, and increased work of breathing   - nicotine patch   - RT protocol   - duonebs q4      GI/Nutrition    Poor nutrition  - NG tube placed and tube feeds started today.   - Free water 300mL every 4 hours.  - Rally bag IV once and thiamine, folate, multivitamin daily    Abd pain  - mildly tendernon exam today, improved, without rebound or peritoneal signs  - likely constipation, no BM in 5 days              - will  start lactulose      Renal/Electrolytes    Hypernatremia  - acute onset, from 132 on admit to 141 to 163  - uncertain if he is symptomatic as altered mental status was present before  - urine lytes show very dilute urine, concern for diabetes insipidus (central, from likely brain mass)  - urine more concentrated after trial of DDAVP  - nephrology consult placed, appreciate recs              - continue D5 in free water at 200mL/hr              - continue DDAVP 2mcg BID              - monitor sodium every 6 hours    Hypercalcemia  - Ca at 13.9 on outpatient labs, up to 14.0 on 8/10, today down to 11.2  - appears asymptomatic, normal sinus rhythm on EKG  - nephrology consult placed as above              - s/p pamidronate 8/10, takes 2 days to effectiveness              - continue calcitonin per their protocol              - continue to hydrate with IV fluids    Renal Insufficiency, resolved  - Patient labs demonstrated Cr level of 2.6, today improved to 1.4  - unclear chronic or acute. Baseline unknown, but PCP notes do not mention chronic kidney disease              - continue hydration as above      Heme/Onc    Metastatic disease  - CT Scan 8/05/2017 with mass in left upper lobe, likely primary lung cancer. Mets were noted in liver, bone, and maybe adrenal glands. New diagnosis.  - alk phos and calcium elevated due to bony involvement  - 30lb weight loss and poor appetite  - head CT negative for intracranial mets  - Ddx: metastatic lung cancer, prostate cancer, or melanoma. Has not had screening tests.              - will plan for biopsy and then oncology involvement   - for biopsy source consider IR doing CT guided bone bx vs pulm doing bronch. Both good options.   - concern for small intracranial mets: will do MRI brain with and without   - workup deferred for now due to critically ill    FEN/Prophy/Core    - IVF: D5 as above  - Electrolytes: hypercalcemia and hypernatremia  - Diet: NPO, starting tube feeds  -  bowel regimen: will start lactulose  - DVT prophy: heparin subQ  - GI prophy: none  - Lines: multiple PIV  - Tubes: larios placed 8/10 and NG tube placed 8/11  - Code: DNR/DNI    Dispo: Inpatient for altered mental status, further workup of likely metastatic lung cancer, treatment of hypercalcemia and hypernatremia

## 2017-08-11 NOTE — CARE PLAN
Problem: Oxygenation:  Goal: Maintain adequate oxygenation dependent on patient condition  Intervention: Levels of oxygenation will improve to baseline  Pt has been on 2L throughout shift.  Did not titrate because of upper airway secretions and pts ALOC

## 2017-08-11 NOTE — PROGRESS NOTES
[facility] Nephrology Progress Note               Author: Jessika Carlson M.D. Date & Time created: 8/11/2017  9:05 AM     Interval History:  This is a 61-year-old  male with a past medical history significant for hypertension, hyperlipidemia, alcohol abuse who was initially admitted with severe back pain and was found to have a left upper lung mass with suspected bony and liver metastases and now noted to have acute hypernatremia and mental status changes within the past 24 hours.  The patient at this time is confused and lethargic and is unable to give me any history.  The majority of history was obtained through review of the medical records and discussion with the other physicians on the case.  The patient was   initially admitted with complaints of severe back pain and he had imaging done as an outpatient that revealed liver and bony mets of unknown primary.  He subsequently underwent a CT scan of the chest that showed a left upper lobe lung mass and so he was admitted for further diagnostic evaluation as well as for pain control.  When patient was initially admitted, he was noted to have an elevated creatinine of 2.39.  It is unclear what his baseline creatinine is and it is unclear if the CT scan done as an outpatient involved IV contrast.  He was hydrated with normal saline with improvement in his renal function.  However, his sodium level started to increase, it was 132 on admission, it was 141 yesterday and this morning's labs revealed a sodium level of 157 and acute change within the past 24 hours and repeat labs later in the morning revealed a sodium level of 163, the patient's normal saline was discontinued.     He was started on D5W.  It should be noted that according to the primary service the patient's mental status was fairly stable on admission.  He was alert and oriented; however, starting yesterday, patient was noted to have increased tremulousness and some mental status changes  that initially were attributed to possible alcohol withdrawal given his heavy alcohol use; however, his symptoms have worsened today.  He is more lethargic and so he has been transferred to the intensive care unit for further evaluation.  It is unclear what his urine output is, he has been incontinent and Larios catheter    is being placed.  His urine studies revealed a urine sodium of less than 10 and a urine osmolality of 199 and a serum osmolality of 349.  As stated above, his creatinine has improved throughout his hospital stay, although it is slightly higher this afternoon at 1.59.    DAILY NEPHROLOGY SUMMARY  8/11/17--Remains confused, evidence of EtOH withdrawal, urine studies consistent with central DI as urine osm did increase after DDAVP, after larios placed pt having large urinary output, Ca improved, BP stable    Review of Systems:  Review of Systems   Unable to perform ROS: mental status change       Physical Exam:  Physical Exam   Constitutional: He appears well-developed. He appears lethargic. He appears cachectic. He appears ill. No distress.   HENT:   Head: Normocephalic and atraumatic.   Mouth/Throat: No oropharyngeal exudate.   Eyes: Conjunctivae and EOM are normal. Pupils are equal, round, and reactive to light. No scleral icterus.   Neck: Normal range of motion. Neck supple. No thyromegaly present.   Cardiovascular: Regular rhythm.  Tachycardia present.    No murmur heard.  Pulmonary/Chest: Effort normal. No respiratory distress. He has decreased breath sounds in the right lower field and the left lower field. He has no wheezes. He has rales.   Abdominal: Soft. Bowel sounds are normal. He exhibits no distension. There is no tenderness.   Musculoskeletal: Normal range of motion. He exhibits no edema or tenderness.   Neurological: He appears lethargic. He exhibits normal muscle tone.   Confused   Skin: Skin is warm and dry. No erythema.   Psychiatric:   Confused, moaning   Nursing note and vitals  reviewed.      Labs:  Recent Labs      08/10/17   1644   ISTATAPH  7.333*   ISTATAPCO2  54.2*   ISTATAPO2  18*   ISTATATCO2  30   [id number]  23*   ISTATARTHCO3  28.8*   ISTATARTBE  2   ISTATTEMP  98.2 F   ISTATFIO2  21   ISTATSPEC  Arterial   ISTATAPHTC  7.336*   [id number]  18*     Recent Labs      08/10/17   1255   CPKTOTAL  35     Recent Labs      08/10/17   0215  08/10/17   0926  08/10/17   1255  17   0033  17   0519   SODIUM  157*  163*  160*  161*  159*   POTASSIUM  3.8  3.5*  3.6   --   3.8   CHLORIDE  124*  130*  131*   --   126*   CO2  20  23  20   --   25   BUN  34*  36*  37*   --   28*   CREATININE  1.52*  1.45*  1.59*   --   1.41*   MAGNESIUM   --    --   2.2   --   1.8   PHOSPHORUS  2.1*   --    --    --   2.6   CALCIUM  13.6*  14.0*  13.5*   --   11.2*     Recent Labs      08/10/17   0926  08/10/17   1255  17   0519   ALTSGPT   --   15  6   ASTSGOT   --   17  10*   ALKPHOSPHAT   --   285*  248*   TBILIRUBIN   --   0.5  0.4   LIPASE   --   14   --    GLUCOSE  101*  112*  161*     Recent Labs      08/10/17   0215  08/10/17   1255  17   0519   RBC  5.01  4.94  3.96*   HEMOGLOBIN  16.2  15.8  13.0*   HEMATOCRIT  52.8*  48.1  39.4*   PLATELETCT  150*  206  154*     Recent Labs      17   0252  08/10/17   0215  08/10/17   1255  17   0519   WBC  14.9*  13.5*  16.8*  15.9*   NEUTSPOLYS  75.50*   --   85.80*   --    LYMPHOCYTES  10.30*   --   7.10*   --    MONOCYTES  9.00   --   6.20   --    EOSINOPHILS  3.80   --   0.90   --    BASOPHILS  0.30   --   0.00   --    ASTSGOT   --    --   17  10*   ALTSGPT   --    --   15  6   ALKPHOSPHAT   --    --   285*  248*   TBILIRUBIN   --    --   0.5  0.4           Hemodynamics:  Temp (24hrs), Av.1 °C (98.7 °F), Min:36.7 °C (98 °F), Max:37.4 °C (99.3 °F)  Temperature: 37.4 °C (99.3 °F), Monitored Temp: 37.5 °C (99.5 °F)  Pulse  Av.4  Min: 73  Max: 143Heart Rate (Monitored): (!) 130  Blood Pressure: 129/90 mmHg, NIBP: 115/68  mmHg     Respiratory:    Respiration: 18, Pulse Oximetry: 100 %, O2 Daily Delivery Respiratory : Silicone Nasal Cannula     Given By:: Mask, Work Of Breathing / Effort: Mild  RUL Breath Sounds: Rhonchi, RML Breath Sounds: Crackles, RLL Breath Sounds: Diminished, CAROLYN Breath Sounds: Rhonchi, LLL Breath Sounds: Diminished  Fluids:    Intake/Output Summary (Last 24 hours) at 08/11/17 0905  Last data filed at 08/11/17 0600   Gross per 24 hour   Intake 3783.2 ml   Output   2125 ml   Net 1658.2 ml     Weight: 70.5 kg (155 lb 6.8 oz)  GI/Nutrition:  Orders Placed This Encounter   Procedures   • DIET NPO     Standing Status: Standing      Number of Occurrences: 1      Standing Expiration Date:      Order Specific Question:  Restrict to:     Answer:  Strict [1]     Medical Decision Making, by Problem:  Active Hospital Problems    Diagnosis   • Hypercalcemia [E83.52]   • Lung cancer metastatic to bone (CMS-HCC) [C34.90, C79.51]     Assessment/Plan:  1. Hypernatremia--acute within last 24hrs, suspicious for DI in the setting of cognitive impairment, pt with low urine osm and low urinary Na and high serum osm on initial eval, after free water and DDAVP given urine osm did increase suggesting central DI as etiology  --Continue D5W and free water flushes ordered  --Start DDAVP 2mcg BID and will increase to QID if serum sodium slow to respond or if polyuria worsens  --Agree with q6h BMP  --Monitor I/O's  --Will give one dose DDAVP and re-check serum Na and urine osm  --Aggressive IVF hydration  --Monitor  2. JOSE L--Cr elevated on admission, no prior baseline, now improved with IVF's, likely related to hypercalcemia and pre-renal factors in addition to polyuria  --Aggressive IVF hydration with free water  --Monitor closely in the setting of bisphosphonate administration  3. Altered Mental Status--likely some component related to acute hypernatremia but also EtOH withdrawal  --CIWA protocol per primary svc  --Correct  hypernatremia  --Consider checking ammonia level  4. Hypercalcemia--likely related to malignancy, improved with IVF's and calcitonin, pamidronate given 8/10  --Continue IVF's  --Pamidronate given 8/10  --Continue calcitonin (should be stopped after 48hrs once pamidronate takes effect)  --Monitor  5. Left Lung Mass--suspicious for malignancy given liver and bony mets  --Supportive care  --Possible biopsy of lesion once pt more stable  6. Leukocytosis--suspected aspiration pneumonia  --Pulm/Critical Care following  --On abx  --Monitor  7. HTN--Unable to take PO  --PRN IV labetalol and IV hydralazine  --[facility] protocol  --Monitor    Medications reviewed, Labs reviewed and Radiology images reviewed

## 2017-08-11 NOTE — CONSULTS
DATE OF SERVICE:  08/10/2017    REQUESTING PHYSICIAN:  [facility].    REASON FOR CONSULTATION:  Evaluation and management of hypernatremia.    HISTORY OF PRESENT ILLNESS:  This is a 61-year-old  male with a past   medical history significant for hypertension, hyperlipidemia, alcohol abuse   who was initially admitted with severe back pain and was found to have a left   upper lung mass with suspected bony and liver metastases and now noted to have   acute hypernatremia and mental status changes within the past 24 hours.  The   patient at this time is confused and lethargic and is unable to give me any   history.  The majority of history was obtained through review of the medical   records and discussion with the other physicians on the case.  The patient was   initially admitted with complaints of severe back pain and he had imaging   done as an outpatient that revealed liver and bony mets of unknown primary.    He subsequently underwent a CT scan of the chest that showed a left upper lobe   lung mass and so he was admitted for further diagnostic evaluation as well as   for pain control.  When patient was initially admitted, he was noted to have   an elevated creatinine of 2.39.  It is unclear what his baseline creatinine is   and it is unclear if the CT scan done as an outpatient involved IV contrast.    He was hydrated with normal saline with improvement in his renal function.    However, his sodium level started to increase, it was 132 on admission, it was   141 yesterday and this morning's labs revealed a sodium level of 157 and   acute change within the past 24 hours and repeat labs later in the morning   revealed a sodium level of 163, the patient's normal saline was discontinued.    He was started on D5W.  It should be noted that according to the primary   service the patient's mental status was fairly stable on admission.  He was   alert and oriented; however, starting yesterday,  patient was noted to have   increased tremulousness and some mental status changes that initially were   attributed to possible alcohol withdrawal given his heavy alcohol use;   however, his symptoms have worsened today.  He is more lethargic and so he has   been transferred to the intensive care unit for further evaluation.  It is   unclear what his urine output is, he has been incontinent and Gramajo catheter   is being placed.  His urine studies revealed a urine sodium of less than 10   and a urine osmolality of 199 and a serum osmolality of 349.  As stated above,   his creatinine has improved throughout his hospital stay, although it is   slightly higher this afternoon at 1.59.    REVIEW OF SYSTEMS:  Unobtainable, the patient is currently lethargic, confused   and is unable to answer any questions.    PAST MEDICAL HISTORY:  1.  Hypertension.  2.  Hyperlipidemia.  3.  Erectile dysfunction.  4.  Alcohol abuse.    PAST SURGICAL HISTORY:  Appendectomy.    ALLERGIES:  No known medical allergies.    SOCIAL HISTORY:  Unobtainable, the patient is currently lethargic and   confused; however, review of the chart reveals that he quit smoking about 3   months ago, but has a 50-pack-year smoking history.  He had initially endorsed   that he drank 2-3 beers daily, but according to the resident his wife later   admitted that he drinks about a 12-pack per day.  There is no history of   illicit drug use.    FAMILY HISTORY:  Unobtainable, the patient is currently lethargic and   confused.    CURRENT MEDICATIONS:  1.  Normal saline at 125 mL per hour, stopped this morning.  2.  D5W at 125 mL per hour.  3.  Unasyn 3 g q.6 hours.  4.  Heparin 5000 units subcutaneous every 8 hours.  5.  Nicoderm patch.  6.  Thiamine 100 mg daily.  7.  Folvite 1 mg daily.  8.  Multivitamin 1 tablet daily.  9.  Pamidronate 90 mg IV x1 dose was given earlier today.  10.  Phenobarbital 776 mg x1 dose given today.  11.  Potassium phosphate x1 dose given  earlier today.    PHYSICAL EXAMINATION:  VITAL SIGNS:  Temperature is 36.7 degrees Celsius, pulse 136, respirations 38,   blood pressure 171/98, satting 95% on room air.  GENERAL:  The patient is confused and lethargic.  He appears chronically ill   and older than his stated age.  HEENT:  Pupils are equal, round and reactive to light.  Extraocular muscles   are intact.  Mucous membranes appear dry.  NECK:  Exam reveals no JVD.  Trachea is midline.  There is no thyromegaly.  CARDIOVASCULAR:  Heart rate is tachycardic, but regular.  There are no   murmurs, rubs, or gallops.  RESPIRATORY:  Lungs are with decreased breath sounds at the bilateral bases.    The patient is tachypneic.  There are no rales or crackles.  There is   symmetrical chest wall rise.  GASTROINTESTINAL:  Abdomen is soft.  There is some slight tenderness to   palpation in the periumbilical region.  Bowel sounds are present.  EXTREMITIES:  Reveals no clubbing or cyanosis or edema.  SKIN:  Reveals no rashes or ulcerations.  There is decreased skin turgor.  NEUROLOGIC:  Patient is confused.  He does not follow commands.  He does   withdraw to painful stimuli.  He is tremulous.  LYMPHATIC:  Reveals no cervical lymphadenopathy, no axillary lymphadenopathy.  PSYCHIATRIC:  Unable to assess.  The patient is lethargic and confused.    LABORATORY DATA:  Labs reveal a white blood cell count of 16.8, hemoglobin   15.8, and platelet count is 206.  Differential reveals 85% neutrophils, 7%   lymphocytes.  Chemistries reveal a sodium of 160, potassium is 3.6, chloride   is 131, bicarbonate is 20, BUN is 37, creatinine is 1.59, calcium is 13.5.    AST is 17, ALT is 15, alkaline phosphatase is 285, albumin is 3.2, phosphorus   is 2.1, magnesium is 2.2, lipase is 14.  CPK is 35, lactic acid level was 2.4   on 08/08/2017.  Repeat was 1.6.  Serum osmolality is 349.  Urine chemistries   reveal an urine sodium of less than 10.  Urine creatinine is 39.6.  Urine   osmolality  is 199.  Urinalysis on 08/10/2017 is negative for ketones, there is   trace occult blood, negative for protein, negative for nitrites, negative for   leukocyte esterase.  Microscopic exam is unremarkable.  ABG on 08/10/2017   reveals a pH of 7.33, pCO2 of 54 and pO2 of 18.  Blood cultures x2 are   negative from 08/08/2017.    IMAGING:  CT scan of head without IV contrast on 08/08/2017 reveals no acute   intracranial abnormality.  There is atrophy.  There are mild periventricular   and subcortical white matter changes present that could be nonspecific or   could be from previous small vessel ischemia, demyelination or gliosis.  There   is a focal hypodensity in the right basal ganglia that may be related to   prominent Virchow-Mello or a prior lacunar infarct.  Chest x-ray on 08/10/2017   reveals left parahilar mass and surrounding interstitial prominence is not   significantly changed compared to prior x-ray, findings are suspicious for   malignancy with possible lymphangitic spread, superimposed infection is not   excluded.    ASSESSMENT AND PLAN:  1.  Hypernatremia.  This is acute and has developed within the last 24 hours.    This is suspicious for diabetes insipidus in the setting of cognitive   impairment.  The patient with low urine osmolality, low urinary sodium and   high serum osmolality.  It is unclear if he is polyuric as he has been   incontinent, Gramajo catheter has been placed.  Unclear if this may represent   central diabetes insipidus due to his underlying malignancy versus nephrogenic   diabetes insipidus related to his hypercalcemia which is quite elevated.  At   this time, recommend discontinuing normal saline and change fluids to D5W at   125 mL per hour, recommend rechecking a serum sodium in 3 hours and if the   serum sodium is still elevated, we will increase the rate of his free water.    Gramajo catheter is to be placed and recommend monitoring his urinary output.    We will give 1 dose of  DDAVP now and recheck his serum sodium and urine   osmolality, recommend aggressive IV fluid hydration and continue to monitor.  2.  Acute renal failure.  There is no prior known history of chronic kidney   disease.  It is unclear what his prior baseline is and his creatinine was   quite elevated on admission; however, it is now improved with IV fluids.    Possibly this could be related to his hypercalcemia and prerenal factors _____   however, it is also unclear if the patient received IV contrast with his   outside CT scan that was done prior to admission.  Recommend aggressive IV   fluid hydration with free water.  Monitor his renal function closely in the   setting of recent bisphosphonates administration, avoid any IV contrast,   nephrotoxins, NSAIDs and dose adjust all medications for his decreased   glomerular filtration rate.  3.  Altered mental status.  This developed yesterday, likely some component   related to acute hypernatremia but also, there is a question as to alcohol   withdrawal.  Patient has been placed on a CIWA protocol per primary service.    We will attempt to correct his hypernatremia consider checking an ammonia   level.  4.  Hypercalcemia.  This is likely related to suspected malignancy.  Continue   aggressive IV fluid hydration, pamidronate was ordered and administered   earlier today.  We will order calcitonin, which should be stopped within 24   hours once the pamidronate takes effect.  Continue to monitor.  5.  Left lung mass.  This is suspicious for malignancy given his liver and   bony mets.  Once the patient is more stable, could consider attempting tissue   diagnosis and biopsy.  Continue supportive care.  6.  Leukocytosis, aspiration pneumonia suspected by pulmonary critical care   who has been consulted.  The patient has been started on empiric antibiotics.    Continue to monitor.  7.  Hypertension.  The patient is currently unable to take p.o. due to his   altered mental status.   Recommend p.r.n. IV labetalol and IV hydralazine.  The   patient is also on a CIWA protocol as alcohol withdrawal can certainly also   affect, hypertension and continue to monitor.    Thank you for this very interesting consult and thank you for involving me in   care of this very pleasant patient.  If you have any questions or need any   further information, please do not hesitate to contact me.       ____________________________________     MD JIAN FIGUEREDO / ISAAC    DD:  08/10/2017 21:28:22  DT:  08/10/2017 22:17:16    D#:  [id number]  Job#:  [id number]

## 2017-08-12 NOTE — PROGRESS NOTES
2030- Pt's pupils noted to be unequal with assessment. R pupil larger than L, both have sluggish reaction to light. Dr. Beltran at bedside and updated. No new orders. Will continue to monitor.

## 2017-08-12 NOTE — CARE PLAN
Problem: Bronchoconstriction:  Goal: Improve in air movement and diminished wheezing  Outcome: PROGRESSING AS EXPECTED  No wheezing noted, crackles through out.  Pt switched to xoponex this shift because of tachycardia   Intervention: Evaluate and manage medication effects  Xoponex QID

## 2017-08-12 NOTE — CARE PLAN
Problem: Communication  Goal: The ability to communicate needs accurately and effectively will improve  Outcome: PROGRESSING SLOWER THAN EXPECTED  Pt unable to communicate verbally. Pt moans and makes incomprehensible sounds, but remains sedated.    Problem: Skin Integrity  Goal: Risk for impaired skin integrity will decrease  Outcome: PROGRESSING AS EXPECTED  Skin intact. Risk factors for skin breakdown include limited mobility and poor nutrition. Q2h turns, bony prominences elevated off of bed, skin assessment q4h.

## 2017-08-12 NOTE — PROGRESS NOTES
Pulmonary Critical Care Progress Note      Date of service:  8/12/2017      Interval Events:  24 hour interval history reviewed  Reason for visit:  Aspiration pneumonia, alcohol withdrawal delirium  Unable to provide CC or ROS due to medical condition       - ST   - phenobarb protocol   - replete lytes   - TF at goal   - elevated ammonia   - CXR about the same   - change to Xopenex due to tachycardia   - scheduled oxy      PFSH:  No change.    Respiratory:     Pulse Oximetry: 94 %  CXR personally reviewed  CXR with unchanged left perihilar opacification  Coarse crackles bilaterally - unchanged    Recent Labs      08/10/17   1644   ISTATAPH  7.333*   ISTATAPCO2  54.2*   ISTATAPO2  18*   ISTATATCO2  30   [id number]  23*   ISTATARTHCO3  28.8*   ISTATARTBE  2   ISTATTEMP  98.2 F   ISTATFIO2  21   ISTATSPEC  Arterial   ISTATAPHTC  7.336*   [id number]  18*       HemoDynamics:  Pulse: (!) 118, Heart Rate (Monitored): (!) 118  NIBP: 103/64 mmHg          Recent Labs      08/10/17   1255   CPKTOTAL  35       Neuro:  Sedated, agitated and aggressive at times  Phenobarbital protocol    Fluids:  Intake/Output       08/10/17 0700 - 08/11/17 0659 08/11/17 0700 - 08/12/17 0659 08/12/17 0700 - 08/13/17 0659      0617-6962 7287-9322 Total 2907-5953 1121-3881 Total 1478-4053 0143-0748 Total       Intake    P.O.  0  0 0  --  0 0  --  -- --    P.O. 0 0 0 -- 0 0 -- -- --    I.V.  1333.2  2450 3783.2  2600  2600 5200  --  -- --    IV Volume (D5W) 500 2150 2650 2400 2400 4800 -- -- --    IV Volume (Postassium Phos) 333.2 -- 333.2 -- -- -- -- -- --    IV Piggyback Volume (Pamidronate) 500 -- 500 -- -- -- -- -- --    IV Piggyback Volume -- 300 300 200 200 400 -- -- --    Other  --  -- --  50  30 80  --  -- --    Medications (P.O./ Enteral Liquids) -- -- -- 50 30 80 -- -- --    Enteral  --  -- --  177 7915 7342  --  -- --    Enteral Volume -- -- -- 125 317 045 -- -- --    Free Water / Tube Flush -- -- -- [phone] -- -- --    Total  Intake 1333.2 2450 3783.2 2145 7924 7775 -- -- --       Output    Urine  600  1525 2125  1020  970 1990  --  -- --    Condom Catheter 600 1525 2125 -- -- -- -- -- --    Number of Times Voided 3 x -- 3 x -- -- -- -- -- --    Indwelling Cathether -- -- -- 2176 752 5202 -- -- --    Stool  --  -- --  --  -- --  --  -- --    Number of Times Stooled -- 0 x 0 x -- 2 x 2 x -- -- --    Total Output 600 1525 2125 6692 452 5194 -- -- --       Net I/O     733.2 925 1228.2 8765 5750 4943 -- -- --        Weight: 73.2 kg (161 lb 6 oz)  Recent Labs      08/10/17   0215   08/10/17   1255   08/11/17   0519   08/11/17   1748  08/12/17   0011  08/12/17   0529   SODIUM  157*   < >  160*   < >  159*   < >  151*  148*  147*   POTASSIUM  3.8   < >  3.6   --   3.8   < >  3.3*  3.3*  3.7   CHLORIDE  124*   < >  131*   --   126*   < >  117*  116*  115*   CO2  20   < >  20   --   25   < >  23  24  26   BUN  34*   < >  37*   --   28*   < >  22  21  19   CREATININE  1.52*   < >  1.59*   --   1.41*   < >  1.18  1.03  1.06   MAGNESIUM   --    --   2.2   --   1.8   --    --    --   1.3*   PHOSPHORUS  2.1*   --    --    --   2.6   --    --    --   1.8*   CALCIUM  13.6*   < >  13.5*   --   11.2*   < >  10.2  9.8  9.5    < > = values in this interval not displayed.       GI/Nutrition:  Abd soft ND/NT  Tolerating enteral TF    Liver Function  Recent Labs      08/10/17   1255  08/11/17   0519   08/11/17   1748  08/12/17   0011  08/12/17   0529   ALTSGPT  15  6   --    --    --   13   ASTSGOT  17  10*   --    --    --   18   ALKPHOSPHAT  285*  248*   --    --    --   206*   TBILIRUBIN  0.5  0.4   --    --    --   0.3   LIPASE  14   --    --    --    --    --    GLUCOSE  112*  161*   < >  203*  172*  121*    < > = values in this interval not displayed.       Heme:  Recent Labs      08/10/17   1255  08/11/17   0519  08/11/17   0840  08/12/17   0529   RBC  4.94  3.96*   --   3.70*   HEMOGLOBIN  15.8  13.0*   --   11.8*   HEMATOCRIT  48.1  39.4*   --   35.8*    PLATELETCT  206  154*   --   135*   PROTHROMBTM   --    --   20.0*   --    INR   --    --   1.65*   --        Infectious Disease:  Temp  Av.7 °C (98.1 °F)  Min: 36 °C (96.8 °F)  Max: 37.4 °C (99.3 °F)  Monitored Temp  Av.7 °C (98 °F)  Min: 36 °C (96.8 °F)  Max: 37.7 °C (99.9 °F)    Recent Labs      08/10/17   1255  17   0519  17   0529   WBC  16.8*  15.9*  10.5   NEUTSPOLYS  85.80*   --    --    LYMPHOCYTES  7.10*   --    --    MONOCYTES  6.20   --    --    EOSINOPHILS  0.90   --    --    BASOPHILS  0.00   --    --    ASTSGOT  17  10*  18   ALTSGPT  15  6  13   ALKPHOSPHAT  285*  248*  206*   TBILIRUBIN  0.5  0.4  0.3     Current Facility-Administered Medications   Medication Dose Frequency Provider Last Rate Last Dose   • levalbuterol (XOPENEX) 1.25 MG/3ML nebulizer solution 1.25 mg  1.25 mg Q2HRS PRN (RT) Ananda Jo M.D.       • levalbuterol (XOPENEX) 1.25 MG/3ML nebulizer solution 1.25 mg  1.25 mg ONCE (RT) Ananda Jo M.D.       • potassium phosphates 30 mmol in D5W 500 mL ivpb  30 mmol Once Stacy Gallegos M.D.       • magnesium sulfate IVPB premix 4 g  4 g Once Stacy Gallegos M.D.       • desmopressin (DDAVP) injection 2 mcg  2 mcg Q12HRS Jessika Carlson M.D.   2 mcg at 17   • lactulose 20 GM/30ML solution 30 mL  30 mL BID Stacy Gallegos M.D.   30 mL at 17   • enoxaparin (LOVENOX) inj 40 mg  40 mg DAILY Ananda Jo M.D.   40 mg at 17   • thiamine (THIAMINE) tablet 100 mg  100 mg DAILY Stacy Gallegos M.D.   100 mg at 17 1126    And   • folic acid (FOLVITE) tablet 1 mg  1 mg DAILY Stacy Gallegos M.D.   1 mg at 17 112    And   • multivitamin (THERAGRAN) tablet 1 Tab  1 Tab DAILY Stacy Gallegos M.D.   1 Tab at 17 1126   • D5W infusion   Continuous Jessika Carlson M.D. 200 mL/hr at 17 0300     • Respiratory Care per Protocol   Continuous RT ABEL Patton MD  ALERT...Phenobarbital Alcohol Withdrawal Protocol Pharmacist to Implement (ICU Only)   PRN Ananda Jo M.D.       • Pharmacy Consult Request 1 Each  1 Each PRN Gurpreet Penaloza PHARMD        And   • PHENObarbital injection 130 mg  130 mg Q30 MIN PRN Gurpreet Penaloza PHARMD   130 mg at 08/12/17 0216    And   • PHENObarbital injection 260 mg  260 mg Q30 MIN PRN Gurpreet Penaloza PHARMD       • ampicillin/sulbactam (UNASYN) 3 g in  mL IVPB  3 g Q6HRS Ananda Jo M.D. 200 mL/hr at 08/12/17 0539 3 g at 08/12/17 0539   • ipratropium-albuterol (DUONEB) nebulizer solution 3 mL  3 mL Q4H PRN (RT) Ananda Jo M.D.       • calcitonin (MIACALCIN) 200 UNIT/ML injection 280 Units  4 Units/kg Q8HR Jessika Carlson M.D.   280 Units at 08/12/17 0539   • hydrocodone/acetaminophen (NORCO)  MG per tablet 1 Tab  1 Tab Q3HRS PRN Stacy Gallegos M.D.   1 Tab at 08/11/17 1700   • nicotine (NICODERM) 21 MG/24HR 21 mg  21 mg Daily-0600 Stacy Gallegos M.D.   21 mg at 08/12/17 0539   • ondansetron (ZOFRAN) syringe/vial injection 4 mg  4 mg Q4HRS PRN Stacy Gallegos M.D.       • ondansetron (ZOFRAN ODT) dispertab 4 mg  4 mg Q4HRS PRN Stacy Gallegos M.D.   4 mg at 08/07/17 2014   • promethazine (PHENERGAN) tablet 12.5-25 mg  12.5-25 mg Q4HRS PRN Stacy Gallegos M.D.       • promethazine (PHENERGAN) suppository 12.5-25 mg  12.5-25 mg Q4HRS PRN Stacy Gallegos M.D.       • prochlorperazine (COMPAZINE) injection 5-10 mg  5-10 mg Q4HRS PRN Stacy Gallegos M.D.         Last reviewed on 8/7/2017  6:55 PM by Ev Beyer [facility]    Quality  Measures:  Labs reviewed, Medications reviewed and Radiology images reviewed                        Assessment and Plan:    Acute unspecified encephalopathy   - toxic metabolic encephalopathy due to alcohol withdrawal delirium  Alcohol withdrawal delirium   - phenobarbital protocol  Aspiration pneumonia   - cont Unasyn  Left hilar mass highly suspicious for bronchogenic carcinoma  with evidence of liver and bony metastases   - tissue diagnosis when improved  Hypercalcemia due to bony metastases    - improved with hydration, calcitonin    - stop calcitonin today   - pamidronate  Acute kidney injury - improved with hydration  Hypernatremia - consistent with central diabetes insipidus   - resolved   - strop IV D5W and free water   - follow Na closely   - DDAVP  Back pain due to osseous metastases  Systemic arterial hypertension   - cont BP control  Dyslipidemia  Tobacco abuse  Alcohol abuse   - vitamins    Critically ill.  High risk for deterioration and worsening vital organ dysfunction.  Keep in ICU.    Critical Care Time:  35 minutes  20959  No time overlap  Time excludes procedures  Discussed with RN, RT, Team

## 2017-08-12 NOTE — CARE PLAN
Problem: Communication  Goal: The ability to communicate needs accurately and effectively will improve  Outcome: PROGRESSING SLOWER THAN EXPECTED  PT not able to communicate effectively at this time. Pt is delirious and agitated.     Problem: Venous Thromboembolism (VTW)/Deep Vein Thrombosis (DVT) Prevention:  Goal: Patient will participate in Venous Thrombosis (VTE)/Deep Vein Thrombosis (DVT)Prevention Measures  Outcome: PROGRESSING AS EXPECTED  Pt compliant with VTE orders and protocols.

## 2017-08-12 NOTE — PROGRESS NOTES
Hospital Medicine Progress Note, Adult, Complex               Author: Anne Chapman Date & Time created: 8/12/2017  12:33 PM     Interval History:  This is a 61-year-old  male with a past medical history significant for hypertension, hyperlipidemia, alcohol abuse who was initially admitted with severe back pain and was found to have a left upper lung mass with suspected bony and liver metastases and now noted to have acute hypernatremia and mental status changes within the past 24 hours.  The patient at this time is confused and lethargic and is unable to give me any history.  The majority of history was obtained through review of the medical records and discussion with the other physicians on the case.  The patient was   initially admitted with complaints of severe back pain and he had imaging done as an outpatient that revealed liver and bony mets of unknown primary.  He subsequently underwent a CT scan of the chest that showed a left upper lobe lung mass and so he was admitted for further diagnostic evaluation as well as for pain control.  When patient was initially admitted, he was noted to have an elevated creatinine of 2.39.  It is unclear what his baseline creatinine is and it is unclear if the CT scan done as an outpatient involved IV contrast.  He was hydrated with normal saline with improvement in his renal function.  However, his sodium level started to increase, it was 132 on admission, it was 141 yesterday and this morning's labs revealed a sodium level of 157 and acute change within the past 24 hours and repeat labs later in the morning revealed a sodium level of 163, the patient's normal saline was discontinued.     He was started on D5W.  It should be noted that according to the primary service the patient's mental status was fairly stable on admission.  He was alert and oriented; however, starting yesterday, patient was noted to have increased tremulousness and some mental status  changes that initially were attributed to possible alcohol withdrawal given his heavy alcohol use; however, his symptoms have worsened today.  He is more lethargic and so he has been transferred to the intensive care unit for further evaluation.  It is unclear what his urine output is, he has been incontinent and Larios catheter    is being placed.  His urine studies revealed a urine sodium of less than 10 and a urine osmolality of 199 and a serum osmolality of 349.  As stated above, his creatinine has improved throughout his hospital stay, although it is slightly higher this afternoon at 1.59.    DAILY NEPHROLOGY SUMMARY  8/11/17--Remains confused, evidence of EtOH withdrawal, urine studies consistent with central DI as urine osm did increase after DDAVP, after larios placed pt having large urinary output, Ca improved, BP stable  8/12/17 - Sodium normal, agree on decreasing fluid, D/C calcitonin     Review of Systems:  Unable to perform ROS: mental status change       Physical Exam:  Constitutional: He appears well-developed. He appears lethargic. He appears cachectic. He appears ill. No distress.   HENT:    Head: Normocephalic and atraumatic.    Mouth/Throat: No oropharyngeal exudate.   Eyes: Conjunctivae and EOM are normal. Pupils are equal, round, and reactive to light. No scleral icterus.   Neck: Normal range of motion. Neck supple. No thyromegaly present.   Cardiovascular: Regular rhythm.  Tachycardia present.     No murmur heard.  Pulmonary/Chest: Effort normal. No respiratory distress. He has decreased breath sounds in the right lower field and the left lower field. He has no wheezes. He has rales.   Abdominal: Soft. Bowel sounds are normal. He exhibits no distension. There is no tenderness.   Musculoskeletal: Normal range of motion. He exhibits no edema or tenderness.   Neurological: He appears lethargic. He exhibits normal muscle tone.   Confused   Skin: Skin is warm and dry. No erythema.   Psychiatric:    Confused, moaning   Nursing note and vitals reviewed.        Review of Systems:  ROS    Physical Exam:  Physical Exam    Labs:  Recent Labs      08/10/17   1644   ISTATAPH  7.333*   ISTATAPCO2  54.2*   ISTATAPO2  18*   ISTATATCO2  30   [id number]  23*   ISTATARTHCO3  28.8*   ISTATARTBE  2   ISTATTEMP  98.2 F   ISTATFIO2  21   ISTATSPEC  Arterial   ISTATAPHTC  7.336*   [id number]  18*     Recent Labs      08/10/17   1255   CPKTOTAL  35     Recent Labs      08/10/17   0215   08/10/17   1255   08/11/17   0519   08/12/17   0011  08/12/17   0529  08/12/17   1205   SODIUM  157*   < >  160*   < >  159*   < >  148*  147*  140   POTASSIUM  3.8   < >  3.6   --   3.8   < >  3.3*  3.7  4.1   CHLORIDE  124*   < >  131*   --   126*   < >  116*  115*  111   CO2  20   < >  20   --   25   < >  24  26  22   BUN  34*   < >  37*   --   28*   < >  21  19  18   CREATININE  1.52*   < >  1.59*   --   1.41*   < >  1.03  1.06  1.13   MAGNESIUM   --    --   2.2   --   1.8   --    --   1.3*   --    PHOSPHORUS  2.1*   --    --    --   2.6   --    --   1.8*   --    CALCIUM  13.6*   < >  13.5*   --   11.2*   < >  9.8  9.5  9.2    < > = values in this interval not displayed.     Recent Labs      08/10/17   1255  08/11/17   0519   08/12/17   0011  08/12/17   0529  08/12/17   1205   ALTSGPT  15  6   --    --   13   --    ASTSGOT  17  10*   --    --   18   --    ALKPHOSPHAT  285*  248*   --    --   206*   --    TBILIRUBIN  0.5  0.4   --    --   0.3   --    LIPASE  14   --    --    --    --    --    GLUCOSE  112*  161*   < >  172*  121*  139*    < > = values in this interval not displayed.     Recent Labs      08/10/17   1255  08/11/17   0519  08/11/17   0840  08/12/17   0529   RBC  4.94  3.96*   --   3.70*   HEMOGLOBIN  15.8  13.0*   --   11.8*   HEMATOCRIT  48.1  39.4*   --   35.8*   PLATELETCT  206  154*   --   135*   PROTHROMBTM   --    --   20.0*   --    INR   --    --   1.65*   --      Recent Labs      08/10/17   1255  08/11/17   0519   17   0529   WBC  16.8*  15.9*  10.5   NEUTSPOLYS  85.80*   --    --    LYMPHOCYTES  7.10*   --    --    MONOCYTES  6.20   --    --    EOSINOPHILS  0.90   --    --    BASOPHILS  0.00   --    --    ASTSGOT  17  10*  18   ALTSGPT  15  6  13   ALKPHOSPHAT  285*  248*  206*   TBILIRUBIN  0.5  0.4  0.3           Hemodynamics:  Temp (24hrs), Av °C (96.8 °F), Min:36 °C (96.8 °F), Max:36 °C (96.8 °F)  Temperature: 36 °C (96.8 °F), Monitored Temp: 37.7 °C (99.9 °F)  Pulse  Av.1  Min: 73  Max: 143Heart Rate (Monitored): (!) 108  NIBP: 103/64 mmHg     Respiratory:    Respiration: (!) 21, Pulse Oximetry: 96 %, O2 Daily Delivery Respiratory : Silicone Nasal Cannula     Given By:: Mask, Work Of Breathing / Effort: Mild  RUL Breath Sounds: Rhonchi, RML Breath Sounds: Rhonchi, RLL Breath Sounds: Diminished, CAROLYN Breath Sounds: Rhonchi, LLL Breath Sounds: Diminished  Fluids:    Intake/Output Summary (Last 24 hours) at 17 1233  Last data filed at 17 0600   Gross per 24 hour   Intake   6125 ml   Output   1270 ml   Net   4855 ml     Weight: 73.2 kg (161 lb 6 oz)  GI/Nutrition:  Orders Placed This Encounter   Procedures   • DIET NPO     Standing Status: Standing      Number of Occurrences: 1      Standing Expiration Date:      Order Specific Question:  Restrict to:     Answer:  Strict [1]     Medical Decision Making, by Problem:  Active Hospital Problems    Diagnosis   • Hypercalcemia [E83.52]   • Lung cancer metastatic to bone (CMS-HCC) [C34.90, C79.51]       Core Measures     Assessment/Plan:    1. Hypernatremia--acute within last 24hrs, suspicious for DI in the setting of cognitive impairment, pt with low urine osm and low urinary Na and high serum osm on initial eval, after free water and DDAVP given urine osm did increase suggesting central DI as etiology  --Sodium normal, agree on decreasing fluid, cont  free water flushes ordered  --Start DDAVP 2mcg BID and will increase to QID if serum sodium slow to  respond or if polyuria worsens  --Agree with q6h BMP  --Monitor I/O's  --Will give one dose DDAVP and re-check serum Na and urine osm  --Aggressive IVF hydration  --Monitor  2. JOSE L--Cr elevated on admission, no prior baseline, now improved with IVF's, likely related to hypercalcemia and pre-renal factors in addition to polyuria  --Aggressive IVF hydration with free water  --Monitor closely in the setting of bisphosphonate administration  3. Altered Mental Status--likely some component related to acute hypernatremia but also EtOH withdrawal  --[facility] protocol per primary svc  --Correct hypernatremia  --Consider checking ammonia level  4. Hypercalcemia--likely related to malignancy, improved with IVF's and calcitonin, pamidronate given 8/10  --Continue IVF's  --Pamidronate given 8/10  -- D/C calcitonin (stopped after 48hrs once pamidronate takes effect)  --Monitor  5. Left Lung Mass--suspicious for malignancy given liver and bony mets  --Supportive care  --Possible biopsy of lesion once pt more stable  6. Leukocytosis--suspected aspiration pneumonia  --Pulm/Critical Care following  --On abx  --Monitor  7. HTN--Unable to take PO  --PRN IV labetalol and IV hydralazine  --[facility] protocol  --Monitor    Medications reviewed, Labs reviewed and Radiology images reviewed

## 2017-08-12 NOTE — PROGRESS NOTES
[facility] [place] [facility] PROGRESS NOTE     Attending: Dr. Radha Verduzco  Resident: Dr. Gallegos    PATIENT: Victor Manuel Glasgow; [id number]; 1955; Hospital Day: 6    ID: 61 y.o. male admitted for finding of metastatic masses on CT and hypercalcemia, now transferred to ICU for altered mental status and acute hyperkalemia.    SUBJECTIVE: No acute events overnight. Patient remains very much the same, agitated when moved or awakened, mostly somnolent, moans but does not answer questions or follow commands.    OBJECTIVE: /90 mmHg  Pulse 120  Temp(Src) 36 °C (96.8 °F)  Resp 19  Ht 1.829 m (6')  Wt 73.2 kg (161 lb 6 oz)  BMI 21.88 kg/m2  SpO2 96%  Filed Vitals:    08/12/17 0100 08/12/17 0200 08/12/17 0300 08/12/17 0400   BP:       Pulse: 112 107 114 120   Temp:       Resp: 13 16 18 19   Height:       Weight:  73.2 kg (161 lb 6 oz)     SpO2: 97% 97% 97% 96%   Pulse Oximetry: 96 %, O2 (LPM): 1, O2 Delivery: Silicone Nasal Cannula      Intake/Output Summary (Last 24 hours) at 08/12/17 0640  Last data filed at 08/12/17 0500   Gross per 24 hour   Intake   7245 ml   Output   1870 ml   Net   5375 ml      DIET:   Orders Placed This Encounter   Procedures   • DIET NPO     Standing Status: Standing      Number of Occurrences: 1      Standing Expiration Date:      Order Specific Question:  Restrict to:     Answer:  Strict [1]       MEDS:    Current facility-administered medications:   •  levalbuterol  •  levalbuterol  •  potassium phosphate ivpb  •  magnesium sulfate  •  desmopressin  •  lactulose  •  enoxaparin (LOVENOX) injection  •  [COMPLETED] Rally Bag infusion **AND** thiamine **AND** folic acid **AND** multivitamin  •  D5W  •  Respiratory Care per Protocol  •  MD ALERT...Phenobarbital Alcohol Withdrawal Protocol Pharmacist to Implement (ICU Only)  •  Pharmacy **AND** If RASS 1 to 4: assess RASS every 30 minutes until RASS returns to goal sedation (RASS -2 to 0) **AND** If RASS -2 to 0: assess RASS every 30 minutes for 2  occurrences and then every 4 hours **AND** If RASS -5 to -3: assess RASS, BP, and RR every 15 minutes with Continuous Pulse Oximetry until RASS returns to goal sedation (RASS -2 to 0) **AND** Pulse ox (oximetry) **AND** PHENObarbital **AND** PHENObarbital  •  ampicillin-sulbactam (UNASYN) IV  •  ipratropium-albuterol  •  calcitonin (MIACALCIN) injection  •  hydrocodone/acetaminophen  •  nicotine  •  ondansetron  •  ondansetron  •  promethazine  •  promethazine  •  prochlorperazine    PE:  General: No acute distress, lying back in bed with NG tube and O2 in place. Responds to voice by opening eyes and moaning.   HEENT: Normocephalic, atraumatic. Mucous membranes dry. Very poor dentition  Cardiovascular: RRR, normal S1/S2, no extra heart sounds appreciated  Respiratory: Symmetric chest rise. Somewhat labored breathing. Minimal diffuse rhonchi.  Abdomen: normoactive bowel sounds, non-distended, soft, non-tender, no masses, no rebound  EXT: LAIRD, No C/C/E  Neuro: no gross focal deficits, moving all extremties    LABS:  Lab Results   Component Value Date    WBC 10.5 2017    HEMOGLOBIN 11.8* 2017    HEMATOCRIT 35.8* 2017    PLATELETCT 135* 2017    MCV 96.8 2017     Lab Results   Component Value Date    SODIUM 147* 2017    POTASSIUM 3.7 2017    CHLORIDE 115* 2017    CO2 26 2017    BUN 19 2017    CREATININE 1.06 2017    GLUCOSE 121* 2017     Recent Labs      17   1149  17   1755  17   0015  17   0534   POCGLUCOSE  148*  180*  160*  116*     IMAGIN/12 CXR    Cardiomediastinal contour is unchanged.    Previously described pulmonary parenchymal opacities persist.    No pneumothorax identified.    Placement of feeding tube with tip at the proximal stomach.    ASSESSMENT/PLAN:  Victor Manuel Glasgow is a 61 y.o. male with admitted with metastatic cancer of unknown primary, likely lung, now with altered mental status, hypercalcemia,  and severe hypernatremia.     Neuro    Altered mental status, Alcoholism, alcohol withdrawal  - altered mentation stable, mostly somnolent  - on admission stated 2-3 beers nightly but per wife pt drinks 12 pack daily  - more likely alcohol withdrawal, vs metabolic or wernickes encephalopathy, vs intracranial mets  - agitation and wanting to leave AMS started 14 hours after admission (and presumably his last drink)  - tachycardia started about 48 hours after admit  - no intracranial process on CT head without contrast  - workup for sepsis unremarkable (UA, blood cultures, CXR)  - not hypercapneic on ABG              - continue phenobarb by protocol in ICU              - anticipate that tachycardia and relative hypertension will improve with this regimen      Cardiac    No active issues: sinus tachycardia as above      Respiratory/Infectious Disease      Aspiration pneumonia  - WBC down to 13 and then back up to 16, likely aspirated  - UA wnl, blood cultures NGTD  - CXR with perihilar infiltrates              - continue unasyn started 8/10 by pulm    Tobacco user  - wheezing noted on exam, and increased work of breathing              - nicotine patch              - RT protocol              - xopenex q4      GI/Nutrition    Poor nutrition  - NG tube placed and tube feeds started, at goal   - Free water 300mL every 4 hours.  - Rally bag IV once and thiamine, folate, multivitamin daily    Abd pain  - mildly tender without rebound or peritoneal signs  - improved after BM              - continue lactulose      Renal/Electrolytes    Hypernatremia  - acute onset, from 132 on admit to 163, now trending down, 147  - uncertain if he is symptomatic since altered mental status was present prior to this  - urine lytes show very dilute urine, concern for diabetes insipidus (central, from likely brain mass)  - urine more concentrated after trial of DDAVP  - nephrology consult placed, appreciate recs              - slow D5 in free  water to 125mL/hr              - continue DDAVP 2mcg BID              - monitor sodium every 6 hours    Hypercalcemia  - Ca at 13.9 on outpatient labs, up to 14.0 on 8/10, today down to 9.5  - appears asymptomatic, normal sinus rhythm on EKG  - nephrology consult placed as above              - s/p pamidronate 8/10, takes 2 days to effectiveness              - continue calcitonin per their protocol              - continue to hydrate with IV fluids    Renal Insufficiency, resolved  - admit creatinine 2.6, today improved to 1.0  - unclear chronic or acute. Baseline unknown, but PCP notes do not mention chronic kidney disease              - monitor with sodium as above      Heme/Onc    Metastatic disease  - CT Scan 8/05/2017 with mass in left upper lobe, likely primary lung cancer. Mets were noted in liver, bone, and maybe adrenal glands. New diagnosis.  - alk phos and calcium elevated due to bony involvement  - 30lb weight loss and poor appetite  - head CT negative for intracranial mets  - Ddx: metastatic lung cancer, prostate cancer, or melanoma. Has not had screening tests.              - will plan for biopsy and then oncology involvement              - for biopsy source consider IR doing CT guided bone bx vs pulm doing bronch. Both good options.              - concern for small intracranial mets: will do MRI brain with and without              - workup deferred for now due to critically ill    FEN/Prophy/Core    - IVF: D5 as above  - Electrolytes: hypercalcemia and hypernatremia  - Diet: NPO, tube feeds with free water  - bowel regimen: continue lactulose  - DVT prophy: heparin subQ  - GI prophy: not indicated  - Lines: multiple PIV  - Tubes: larios placed 8/10 and NG tube placed 8/11  - Code: DNR/DNI    Dispo: Inpatient for altered mental status, further workup of likely metastatic lung cancer, treatment of hypercalcemia and hypernatremia

## 2017-08-12 NOTE — NON-PROVIDER
[facility] – [place] [facility] PROGRESS NOTE     Attending: Dr. Radha Verduzco  Resident: Dr. Gallegos    PATIENT: Victor Manuel Glasgow; [id number]; 1955; Hospital Day: 6 S132-01    ID: 61 y.o. male admitted for new finding of metastatic masses, continues inpatient due to altered mental status and critically elevated electrolytes    SUBJECTIVE:  - Patient still has the same altered mental status as before.  - He was sleeping with his eyes closed this time.  - Mr. Glasgow still had episodes of sinus tachycardia but have gone from the 140's a few days ago to 110's yesterday night.      OBJECTIVE: /90 mmHg  Pulse 120  Temp(Src) 36 °C (96.8 °F)  Resp 19  Ht 1.829 m (6')  Wt 73.2 kg (161 lb 6 oz)  BMI 21.88 kg/m2  SpO2 96%  Filed Vitals:    08/12/17 0100 08/12/17 0200 08/12/17 0300 08/12/17 0400   BP:       Pulse: 112 107 114 120   Temp:       Resp: 13 16 18 19   Height:       Weight:  73.2 kg (161 lb 6 oz)     SpO2: 97% 97% 97% 96%   Pulse Oximetry: 96 %, O2 (LPM): 1, O2 Delivery: Silicone Nasal Cannula      Intake/Output Summary (Last 24 hours) at 08/12/17 0605  Last data filed at 08/12/17 0500   Gross per 24 hour   Intake   7245 ml   Output   1870 ml   Net   5375 ml      DIET:   Orders Placed This Encounter   Procedures   • DIET NPO     Standing Status: Standing      Number of Occurrences: 1      Standing Expiration Date:      Order Specific Question:  Restrict to:     Answer:  Strict [1]       MEDS:  Current facility-administered medications:    •  Phenobarbital Protocol ( 130mg injection [id number] PRN???)  •  IV Unasyn (ampicillin/sulbactam) 3g in 100mL NS  •  Calcitonin 4units/kg (280 units)  •  IV D5W infusion 200mL/hr  •  DDAVP 2mcg BID  •  Enoxaparin 40mg injection  •  Pamidronate  •  Ipratropium/albuterol (duoneb) solution 3mL [id number]  •  Nicoderm 21mg/24hrs  •  Thiamine 100mg PO QD (scheduled at 9)  •  Folic Acid 1mg PO QD  •  Multivitamin theragran 1 tab PO QD      PE:  General: No acute distress, sleeping lying back  in bed but does not awaken to any stimuli.   HEENT: Normocephalic, atraumatic. MMM.  Cardiovascular: RRR, normal S1/S2, no extra heart sounds appreciated  Respiratory: Symmetric chest rise. CTAB, moving air well.    LABS:  Recent Labs      08/10/17   1255  08/11/17   0519  08/12/17   0529   WBC  16.8*  15.9*  10.5   RBC  4.94  3.96*  3.70*   HEMOGLOBIN  15.8  13.0*  11.8*   HEMATOCRIT  48.1  39.4*  35.8*   MCV  97.4  99.5*  96.8   MCH  32.0  32.8  31.9   RDW  44.4  46.1  45.1   PLATELETCT  206  154*  135*   MPV  9.5  9.4  9.6   NEUTSPOLYS  85.80*   --    --    LYMPHOCYTES  7.10*   --    --    MONOCYTES  6.20   --    --    EOSINOPHILS  0.90   --    --    BASOPHILS  0.00   --    --    RBCMORPHOLO  Normal   --    --        Recent Labs      08/10/17   1255   08/11/17   1748  08/12/17   0011  08/12/17   0529   SODIUM  160*   < >  151*  148*  147*   POTASSIUM  3.6   < >  3.3*  3.3*  3.7   CHLORIDE  131*   < >  117*  116*  115*   CO2  20   < >  23  24  26   GLUCOSE  112*   < >  203*  172*  121*   BUN  37*   < >  22  21  19   CPKTOTAL  35   --    --    --    --     < > = values in this interval not displayed.     Recent Labs      08/10/17   1255  08/11/17   0519   08/12/17   0011  08/12/17   0529  08/12/17   1205   ALBUMIN  3.2  2.7*   --    --   2.4*   --    TBILIRUBIN  0.5  0.4   --    --   0.3   --    ALKPHOSPHAT  285*  248*   --    --   206*   --    TOTPROTEIN  7.5  6.2   --    --   5.7*   --    ALTSGPT  15  6   --    --   13   --    ASTSGOT  17  10*   --    --   18   --    CREATININE  1.59*  1.41*   < >  1.03  1.06  1.13    < > = values in this interval not displayed.       Recent Labs      08/11/17   1149  08/11/17   1755  08/12/17   0015  08/12/17   0534   POCGLUCOSE  148*  180*  160*  116*       MICROBIOLOGY:  UA 08/10/2017 @ 5:50AM  - No bacteria present  - Leukocyte esterase (-)  - Nitrite (-)      IMAGING:  CXR 08/12/2017 @ 2:31 AM: no significant changes from previously taken X-rays.    Abdominal X-ray  08/11/2017: for placement of the feeding tube.    CT Scan of the chest, abdomen, and pelvis (Scanned over on 08/09/2017 @ 3:29PM)  - Showed a mass within the left lung near the perihilar region.    MRI of the lumbar Spine (Scanned over on 08/09/2017 @ 3:28PM)  - Showed mets throughout the spine    EKG (08/08/2017 @ 9:31PM)  - Sinus Tachycardia: 100-150 bpm    Head CT without contrast (08/08/2017 @ 3:31PM)  - Mild periventricular and subcortical white matter changes present.  This finding is nonspecific and could be from previous small vessel ischemia, demyelination, or gliosis.  - Focal hypodensity in the right basal ganglia may be related to a prominent Virchow-Mello (perivascular space) or a prior lacunar infarct.  - No Mets to the brain        ASSESSMENT/PLAN:    ID: [id number] who presents alcohol withdrawal syndrome and abnormal results on CT and MRI imaging indicative of lung cancer with metastasis.      Delirium/Restlessness  Ddx: alcohol withdrawal syndrome, metastasis to the brain, severe electrolyte abnormalities  - Pt was harassing staff and threatened to leave while on medical hold 14 hours into hospital admission  - Patient has been exhibiting signs of alcohol withdrawal and has been put on a medical hold until appropriate measures can be taken for care.  - Wife of patient told the team 08/09/2017 that he does drink 12 beers per day but stopped drinking the past 2-3 days prior to hospitalization.  - Ammonia came out to 49 (11-45)  - Na levels still high at 147 (down from 158 yesterday)  Plan  - Patient has been put on Phenobarb protocol  - Correct sodium levels with D5W  - Monitor serum electrolyte levels and adjust accordingly.    Metastatic Growths noted on CT and MRI  - CT Scan: results came back on 08/05/2017 with growth that was seen in the left upper lobe consistent with a neoplasm most likely due to primary lung cancer. Metastatic growths were also noted in the liver, bone, and maybe the adrenal  glands.  - MRI Scan: per PCP, indicated metastatic growths in the spine/back.  - Head CT without contrast: no definitive diagnosis of mets. Lacunar infarcts are present.  - CXR: bilateral perihilar atelectasis observed consistent with cancer of the lung.    Renal Insufficiency  - Resolved: labs demonstrated Cr level of 1.35 yesterday, Cr is going down to 1.06 today  - Maintain aggressive IV D5W to reduce dehydration in patient and reassess kidney function at a later time if desired.  - Place patient on parenteral phosphorus per critical care.    Hypercalcemia  - Patient labs indicated a critically high level of Ca at 11.0 (08/11/2017), now patients Ca is going down to 9.5.  - Monitor Ca levels and assess further need of Pamidronate.    Hypernatremia  - Sodium levels continue to be high but have been decreasing overnight (from 158 to 147)  Plan  - Maintain D5W to reduce Na concentration in serum  - Monitor serum levels to assess for level of correction  - Continue administration of DDAVP 2mcg BID      FEN/Prophy/Core    - IVF: NS for rehydration  - Electrolytes: monitor over time  - Diet: regular, low Calcium diet if possible  - bowel regimen: miralax if needed to relieve constipation  - DVT prophy: none  - GI prophy: none  - Lines: PIV  - Tubes: None  - Code: DNR    Dispo: Inpatient for further workup and confirmation of possible lung cancer in addition to critically high electrolytes.   PCP: Jose Daniel Cordero DO  Code Status: DNR

## 2017-08-13 NOTE — PROGRESS NOTES
Pulmonary Critical Care Progress Note      Date of service:  8/13/2017      Interval Events:  24 hour interval history reviewed  Reason for visit:  Aspiration pneumonia, alcohol withdrawal delirium  Unable to provide CC or ROS due to medical condition       - ST   - phenobarb protocol   - TF at goal   - needs NT suction   - CXR with slightly increased left perihilar and RLL SSA   - add mucolytic   - place nasal trumpet      PFSH:  No change.    Respiratory:     Pulse Oximetry: 97 %  CXR personally reviewed  CXR with increased left perihilar opacification and RLL SSA  Coarse crackles bilaterally - unchanged  Needs to be NT suctioned - unable to clear secretions on his own    Recent Labs      08/10/17   1644   ISTATAPH  7.333*   ISTATAPCO2  54.2*   ISTATAPO2  18*   ISTATATCO2  30   [id number]  23*   ISTATARTHCO3  28.8*   ISTATARTBE  2   ISTATTEMP  98.2 F   ISTATFIO2  21   ISTATSPEC  Arterial   ISTATAPHTC  7.336*   [id number]  18*       HemoDynamics:  Pulse: (!) 114, Heart Rate (Monitored): (!) 113  NIBP: (!) 90/59 mmHg          Recent Labs      08/10/17   1255   CPKTOTAL  35       Neuro:  Sedated, agitated and aggressive at times  Phenobarbital protocol    Fluids:  Intake/Output       08/11/17 0700 - 08/12/17 0659 08/12/17 0700 - 08/13/17 0659 08/13/17 0700 - 08/14/17 0659      4683-4259 0006-1523 Total 0115-8697 8153-3469 Total 0203-3966 7872-2568 Total       Intake    P.O.  --  0 0  --  -- --  --  -- --    P.O. -- 0 0 -- -- -- -- -- --    I.V.  2600  2600 5200  2499.8  200 2699.8  --  -- --    Magnesium Sulfate Volume -- -- -- 150 -- 150 -- -- --    IV Volume (D5W) 2400 2400 4800 1650 -- 1650 -- -- --    IV Volume (Potassium Phos) -- -- -- 499.8 -- 499.8 -- -- --    IV Piggyback Volume 200 200 400 200 200 400 -- -- --    Other  50  30 80  100  30 130  --  -- --    Medications (P.O./ Enteral Liquids) 50 30 80 100 30 130 -- -- --    Enteral  975  1520 2495  1580  870 2450  --  -- --    Enteral Volume 125 751 705   -- -- --    Free Water / Tube Flush  800 90 890 -- -- --    Total Intake 3625 4150 7775 4179.8 1100 5279.8 -- -- --       Output    Urine  1020  970 1990  950  900 1850  --  -- --    Indwelling Cathether 0106 198 3677  -- -- --    Drains  --  -- --  260  200 460  --  -- --    Rectal Tube -- -- -- 260 200 460 -- -- --    Stool  --  -- --  --  -- --  --  -- --    Number of Times Stooled -- 2 x 2 x 3 x -- 3 x -- -- --    Total Output 9933 820 4114 1210 1100 2310 -- -- --       Net I/O     2605 3180 5785 2969.8 0 2969.8 -- -- --        Weight: 75.5 kg (166 lb 7.2 oz)  Recent Labs      08/11/17 0519 08/12/17 0529 08/12/17   1720 08/12/17   2340  08/13/17   0500   SODIUM  159*   < >  147*   < >  137  136  139   POTASSIUM  3.8   < >  3.7   < >  4.1  4.5  4.7   CHLORIDE  126*   < >  115*   < >  108  107  106   CO2  25   < >  26   < >  24  22  26   BUN  28*   < >  19   < >  17  19  22   CREATININE  1.41*   < >  1.06   < >  0.97  1.09  1.12   MAGNESIUM  1.8   --   1.3*   --    --    --   2.0   PHOSPHORUS  2.6   --   1.8*   --    --    --   3.1   CALCIUM  11.2*   < >  9.5   < >  8.6  8.7  8.9    < > = values in this interval not displayed.       GI/Nutrition:  Abd soft ND/NT  Tolerating enteral TF    Liver Function  Recent Labs      08/10/17   1255  08/11/17 0519 08/12/17 0529 08/12/17   1720  08/12/17   2340  08/13/17   0500   ALTSGPT  15  6   --   13   --    --    --   14   ASTSGOT  17  10*   --   18   --    --    --   32   ALKPHOSPHAT  285*  248*   --   206*   --    --    --   328*   TBILIRUBIN  0.5  0.4   --   0.3   --    --    --   0.3   LIPASE  14   --    --    --    --    --    --    --    GLUCOSE  112*  161*   < >  121*   < >  111*  108*  93    < > = values in this interval not displayed.       Heme:  Recent Labs      08/11/17   0519  08/11/17   0840  08/12/17   0529  08/13/17   0500   RBC  3.96*   --   3.70*  3.64*   HEMOGLOBIN  13.0*   --   11.8*  12.0*    HEMATOCRIT  39.4*   --   35.8*  36.1*   PLATELETCT  154*   --   135*  118*   PROTHROMBTM   --   20.0*   --    --    INR   --   1.65*   --    --        Infectious Disease:  Monitored Temp  Av.6 °C (99.6 °F)  Min: 37.2 °C (99 °F)  Max: 38.1 °C (100.6 °F)    Recent Labs      08/10/17   1255  17   0519  17   0529  17   0500   WBC  16.8*  15.9*  10.5  11.2*   NEUTSPOLYS  85.80*   --    --    --    LYMPHOCYTES  7.10*   --    --    --    MONOCYTES  6.20   --    --    --    EOSINOPHILS  0.90   --    --    --    BASOPHILS  0.00   --    --    --    ASTSGOT  17  10*  18  32   ALTSGPT  15  6  13  14   ALKPHOSPHAT  285*  248*  206*  328*   TBILIRUBIN  0.5  0.4  0.3  0.3     Current Facility-Administered Medications   Medication Dose Frequency Provider Last Rate Last Dose   • levalbuterol (XOPENEX) 1.25 MG/3ML nebulizer solution 1.25 mg  1.25 mg Q2HRS PRN (RT) Ananda Jo M.D.       • phosphorus (K-PHOS-NEUTRAL, PHOSPHA 250 NEUTRAL) per tablet 1 Tab  1 Tab Q6HRS Ananda Jo M.D.   1 Tab at 17 0510   • oxycodone immediate release (ROXICODONE) tablet 10 mg  10 mg Q6HRS Ananda Jo M.D.   10 mg at 17 0510   • levalbuterol (XOPENEX) 1.25 MG/3ML nebulizer solution 1.25 mg  1.25 mg 4X/DAY (RT) Ananda Jo M.D.   1.25 mg at 17   • desmopressin (DDAVP) injection 2 mcg  2 mcg Q12HRS Jessika Carlson M.D.   2 mcg at 17   • lactulose 20 GM/30ML solution 30 mL  30 mL BID Stacy Gallegos M.D.   Stopped at 17 0900   • enoxaparin (LOVENOX) inj 40 mg  40 mg DAILY Ananda Jo M.D.   40 mg at 17   • thiamine (THIAMINE) tablet 100 mg  100 mg DAILY Stacy Gallegos M.D.   100 mg at 17    And   • folic acid (FOLVITE) tablet 1 mg  1 mg DAILY Stacy Gallegos M.D.   1 mg at 17    And   • multivitamin (THERAGRAN) tablet 1 Tab  1 Tab DAILY Stacy Gallegos M.D.   1 Tab at 17   • Respiratory  Care per Protocol   Continuous RT Stacy Gallegos M.D.       • MD ALERT...Phenobarbital Alcohol Withdrawal Protocol Pharmacist to Implement (ICU Only)   PRN Ananda Jo M.D.       • Pharmacy Consult Request 1 Each  1 Each PRN Gurpreet Penaloza PHARMD        And   • PHENObarbital injection 130 mg  130 mg Q30 MIN PRN Gurpreet ANALI Penaloza   130 mg at 08/12/17 1033    And   • PHENObarbital injection 260 mg  260 mg Q30 MIN PRN Gurpreetglory Penaloza PHARMD       • ampicillin/sulbactam (UNASYN) 3 g in  mL IVPB  3 g Q6HRS Ananda Jo M.D.   Stopped at 08/13/17 0539   • ipratropium-albuterol (DUONEB) nebulizer solution 3 mL  3 mL Q4H PRN (RT) Ananda Jo M.D.       • hydrocodone/acetaminophen (NORCO)  MG per tablet 1 Tab  1 Tab Q3HRS PRN Stacy Gallegos M.D.   1 Tab at 08/11/17 1700   • nicotine (NICODERM) 21 MG/24HR 21 mg  21 mg Daily-0600 Stacy Gallegos M.D.   21 mg at 08/13/17 0510   • ondansetron (ZOFRAN) syringe/vial injection 4 mg  4 mg Q4HRS PRN Stacy Gallegos M.D.       • ondansetron (ZOFRAN ODT) dispertab 4 mg  4 mg Q4HRS PRN Stacy Gallegos M.D.   4 mg at 08/07/17 2014   • promethazine (PHENERGAN) tablet 12.5-25 mg  12.5-25 mg Q4HRS PRN Stacy Gallegos M.D.       • promethazine (PHENERGAN) suppository 12.5-25 mg  12.5-25 mg Q4HRS PRN Stacy Gallegos M.D.       • prochlorperazine (COMPAZINE) injection 5-10 mg  5-10 mg Q4HRS PRN Stacy Gallegos M.D.         Last reviewed on 8/7/2017  6:55 PM by Nnamdi Wilson    Quality  Measures:  Labs reviewed, Medications reviewed and Radiology images reviewed                        Assessment and Plan:    Acute unspecified encephalopathy   - toxic metabolic encephalopathy due to alcohol withdrawal delirium  Alcohol withdrawal delirium   - phenobarbital protocol  Aspiration pneumonia   - cont Unasyn   - add mucolytic  Left hilar mass highly suspicious for bronchogenic carcinoma with evidence of liver and bony metastases   - tissue diagnosis  when improved  Hypercalcemia due to bony metastases    - improved with hydration, calcitonin    - stopped calcitonin yesterday   - pamidronate  Acute kidney injury - improved with hydration  Hypernatremia - consistent with central diabetes insipidus   - resolved   - decrease free water   - follow Na closely   - DDAVP   - MRI of brain when he is improved  Back pain due to osseous metastases   - cont analgesia  Systemic arterial hypertension   - cont BP control  Dyslipidemia  Tobacco abuse  Alcohol abuse   - vitamins  DNR/DNI    Critically ill.  High risk for deterioration and worsening vital organ dysfunction.  Keep in ICU.    Critical Care Time:  32 minutes  [id number]  No time overlap  Time excludes procedures  Discussed with RN, RT, Team

## 2017-08-13 NOTE — OP REPORT
DATE OF SERVICE:  08/13/2017    TITLE OF THE PROCEDURE:  Central venous catheter placement.    INDICATION FOR THE PROCEDURE:  Hypotension.    NARRATIVE:  The right neck was prepped with chlorhexidine and draped in the   usual sterile fashion.  Xylocaine 1% solution was used for topical anesthesia.    A triple lumen central venous catheter was easily placed into the right   internal jugular vein under ultrasound guidance on the first attempt using the   technique described by Tiffani without difficulty or apparent complication.    The line was sutured into place and a sterile dressing was placed over the   line.  All 3 ports flushed and returned venous blood easily.  The patient   tolerated the procedure quite nicely.  No complications were apparent.  A   chest x-ray will be obtained in the next few minutes to confirm placement.       ____________________________________     MD BERNA ALEXANDER / ISAAC    DD:  08/13/2017 15:02:01  DT:  08/13/2017 16:06:25    D#:  [id number]  Job#:  [id number]

## 2017-08-13 NOTE — PROGRESS NOTES
Hospital Medicine Progress Note, Adult, Complex               Author: Anne Chapman Date & Time created: 8/13/2017  10:30 AM     Interval History:    This is a 61-year-old  male with a past medical history significant for hypertension, hyperlipidemia, alcohol abuse who was initially admitted with severe back pain and was found to have a left upper lung mass with suspected bony and liver metastases and now noted to have acute hypernatremia and mental status changes within the past 24 hours.  The patient at this time is confused and lethargic and is unable to give me any history.  The majority of history was obtained through review of the medical records and discussion with the other physicians on the case.  The patient was   initially admitted with complaints of severe back pain and he had imaging done as an outpatient that revealed liver and bony mets of unknown primary.  He subsequently underwent a CT scan of the chest that showed a left upper lobe lung mass and so he was admitted for further diagnostic evaluation as well as for pain control.  When patient was initially admitted, he was noted to have an elevated creatinine of 2.39.  It is unclear what his baseline creatinine is and it is unclear if the CT scan done as an outpatient involved IV contrast.  He was hydrated with normal saline with improvement in his renal function.  However, his sodium level started to increase, it was 132 on admission, it was 141 yesterday and this morning's labs revealed a sodium level of 157 and acute change within the past 24 hours and repeat labs later in the morning revealed a sodium level of 163, the patient's normal saline was discontinued.     He was started on D5W.  It should be noted that according to the primary service the patient's mental status was fairly stable on admission.  He was alert and oriented; however, starting yesterday, patient was noted to have increased tremulousness and some mental status  changes that initially were attributed to possible alcohol withdrawal given his heavy alcohol use; however, his symptoms have worsened today.  He is more lethargic and so he has been transferred to the intensive care unit for further evaluation.  It is unclear what his urine output is, he has been incontinent and Larios catheter    is being placed.  His urine studies revealed a urine sodium of less than 10 and a urine osmolality of 199 and a serum osmolality of 349.  As stated above, his creatinine has improved throughout his hospital stay, although it is slightly higher this afternoon at 1.59.    DAILY NEPHROLOGY SUMMARY  8/11/17--Remains confused, evidence of EtOH withdrawal, urine studies consistent with central DI as urine osm did increase after DDAVP, after larios placed pt having large urinary output, Ca improved, BP stable  8/12/17 - Sodium normal, agree on decreasing fluid, D/C calcitonin   8/13/17 - Hypernatremia resolved , cont DDAVP, D/C fluid and cont free water flush.     Review of Systems:  Unable to perform ROS: mental status change       Physical Exam:  Constitutional: He appears well-developed. He appears lethargic. He appears cachectic. He appears ill. No distress.    HENT:    Head: Normocephalic and atraumatic.    Mouth/Throat: No oropharyngeal exudate.    Eyes: Conjunctivae and EOM are normal. Pupils are equal, round, and reactive to light. No scleral icterus.    Neck: Normal range of motion. Neck supple. No thyromegaly present.    Cardiovascular: Regular rhythm.  Tachycardia present.     No murmur heard.  Pulmonary/Chest: Effort normal. No respiratory distress. He has decreased breath sounds in the right lower field and the left lower field. He has no wheezes. He has rales.    Abdominal: Soft. Bowel sounds are normal. He exhibits no distension. There is no tenderness.    Musculoskeletal: Normal range of motion. He exhibits no edema or tenderness.    Neurological: He appears lethargic. He exhibits  normal muscle tone.    Confused    Skin: Skin is warm and dry. No erythema.    Psychiatric:    Confused, moaning    Nursing note and vitals reviewed.    Review of Systems:  ROS    Physical Exam:  Physical Exam    Labs:  Recent Labs      08/10/17   1644   ISTATAPH  7.333*   ISTATAPCO2  54.2*   ISTATAPO2  18*   ISTATATCO2  30   [id number]  23*   ISTATARTHCO3  28.8*   ISTATARTBE  2   ISTATTEMP  98.2 F   ISTATFIO2  21   ISTATSPEC  Arterial   ISTATAPHTC  7.336*   [id number]  18*     Recent Labs      08/10/17   1255   CPKTOTAL  35     Recent Labs      08/11/17   0519 08/12/17   0529   08/12/17   1720 08/12/17   2340  08/13/17   0500   SODIUM  159*   < >  147*   < >  137  136  139   POTASSIUM  3.8   < >  3.7   < >  4.1  4.5  4.7   CHLORIDE  126*   < >  115*   < >  108  107  106   CO2  25   < >  26   < >  24  22  26   BUN  28*   < >  19   < >  17  19  22   CREATININE  1.41*   < >  1.06   < >  0.97  1.09  1.12   MAGNESIUM  1.8   --   1.3*   --    --    --   2.0   PHOSPHORUS  2.6   --   1.8*   --    --    --   3.1   CALCIUM  11.2*   < >  9.5   < >  8.6  8.7  8.9    < > = values in this interval not displayed.     Recent Labs      08/10/17   1255  08/11/17   0519   08/12/17   0529   08/12/17   1720  08/12/17   2340  08/13/17   0500   ALTSGPT  15  6   --   13   --    --    --   14   ASTSGOT  17  10*   --   18   --    --    --   32   ALKPHOSPHAT  285*  248*   --   206*   --    --    --   328*   TBILIRUBIN  0.5  0.4   --   0.3   --    --    --   0.3   LIPASE  14   --    --    --    --    --    --    --    GLUCOSE  112*  161*   < >  121*   < >  111*  108*  93    < > = values in this interval not displayed.     Recent Labs      08/11/17   0519  08/11/17   0840  08/12/17   0529  08/13/17   0500   RBC  3.96*   --   3.70*  3.64*   HEMOGLOBIN  13.0*   --   11.8*  12.0*   HEMATOCRIT  39.4*   --   35.8*  36.1*   PLATELETCT  154*   --   135*  118*   PROTHROMBTM   --   20.0*   --    --    INR   --   1.65*   --    --      Recent Labs       08/10/17   1255  17   0519  17   0529  17   0500   WBC  16.8*  15.9*  10.5  11.2*   NEUTSPOLYS  85.80*   --    --    --    LYMPHOCYTES  7.10*   --    --    --    MONOCYTES  6.20   --    --    --    EOSINOPHILS  0.90   --    --    --    BASOPHILS  0.00   --    --    --    ASTSGOT  17  10*  18  32   ALTSGPT  15  6  13  14   ALKPHOSPHAT  285*  248*  206*  328*   TBILIRUBIN  0.5  0.4  0.3  0.3           Hemodynamics:  No data recorded.  Monitored Temp: 38.4 °C (101.1 °F)  Pulse  Av.4  Min: 73  Max: 143Heart Rate (Monitored): (!) 118  NIBP: (!) 90/59 mmHg     Respiratory:    Respiration: 14, Pulse Oximetry: 100 %, O2 Daily Delivery Respiratory : Silicone Nasal Cannula     Given By:: Mask, Work Of Breathing / Effort: Moderate;Increased Work of Breathing  RUL Breath Sounds: Coarse Crackles, RML Breath Sounds: Coarse Crackles, RLL Breath Sounds: Diminished, CAROLYN Breath Sounds: Coarse Crackles, LLL Breath Sounds: Diminished  Fluids:    Intake/Output Summary (Last 24 hours) at 17 1030  Last data filed at 17 1000   Gross per 24 hour   Intake 4034.9 ml   Output   1910 ml   Net 2124.9 ml     Weight: 75.5 kg (166 lb 7.2 oz)  GI/Nutrition:  Orders Placed This Encounter   Procedures   • DIET NPO     Standing Status: Standing      Number of Occurrences: 1      Standing Expiration Date:      Order Specific Question:  Restrict to:     Answer:  Strict [1]     Medical Decision Making, by Problem:  Active Hospital Problems    Diagnosis   • Hypercalcemia [E83.52]   • Lung cancer metastatic to bone (CMS-HCC) [C34.90, C79.51]       Core Measures    Assessment/Plan:    1. Hypernatremia  --suspicious for DI in the setting of cognitive impairment  --  pt with low urine osm and low urinary Na and high serum osm on initial eval, after free water and DDAVP given urine osm did increase suggesting central DI as etiology  --Sodium normal, agree on decreasing fluid, cont  free water flushes ordered  -- Cont   DDAVP 2mcg BID   --Agree with q6h BMP  --Monitor I/O's  --Aggressive IVF hydration  --Monitor  2. JOSE L-  --Cr elevated on admission, no prior baseline, now improved with IVF's, likely related to hypercalcemia and pre-renal factors in addition to polyuria  --Aggressive IVF hydration with free water  --Monitor closely in the setting of bisphosphonate administration  3. Altered Mental Status  --likely some component related to acute hypernatremia but also EtOH withdrawal  --[facility] protocol per primary svc  --Correct hypernatremia  --Consider checking ammonia level  4. Hypercalcemia  --likely related to malignancy, improved with IVF's and calcitonin, pamidronate given 8/10  --Continue IVF's  --Pamidronate given 8/10  -- D/C calcitonin (stopped after 48hrs once pamidronate takes effect)  --Monitor  5. Left Lung Mass--suspicious for malignancy given liver and bony mets  --Supportive care  --Possible biopsy of lesion once pt more stable  6. Leukocytosis--suspected aspiration pneumonia  --Pulm/Critical Care following  --On abx  --Monitor  7. HTN--Unable to take PO  --PRN IV labetalol and IV hydralazine  --[facility] protocol  --Monitor

## 2017-08-13 NOTE — CARE PLAN
Problem: Knowledge Deficit  Goal: Knowledge of disease process/condition, treatment plan, diagnostic tests, and medications will improve  Outcome: PROGRESSING SLOWER THAN EXPECTED  Educated pt about current status and treatment plans. Pt unable to demonstrate or verbalize understanding.    Problem: Pain Management  Goal: Pain level will decrease to patient’s comfort goal  Outcome: PROGRESSING AS EXPECTED  Pt displays discomfort with movement/stimulation. Makes moaning/groaning sounds. Medicated per MAR, repositioned Q2H and as needed for comfort, provided decreased stimulation. Pt able to rest.

## 2017-08-13 NOTE — PROGRESS NOTES
[facility]     Attending: Dr. Radha Verduzco  Resident: Dr. Samuel    PATIENT: Victor Manuel Glasgow; [id number]; 1955; Hospital Day: 6    ID: 61 y.o. male admitted for finding of metastatic masses on CT and hypercalcemia, now transferred to ICU for altered mental status and acute hypernatremia    SUBJECTIVE: No acute events overnight. Patient was moaning this morning but does not answer questions or follow commands. However, nursing staff stated he was answering some questions this AM. Reporting that he had pain everywhere.    OBJECTIVE: /90 mmHg  Pulse 106  Temp(Src) 36 °C (96.8 °F)  Resp 15  Ht 1.829 m (6')  Wt 75.5 kg (166 lb 7.2 oz)  BMI 22.57 kg/m2  SpO2 99%  Filed Vitals:    08/13/17 0400 08/13/17 0500 08/13/17 0600 08/13/17 0722   BP:       Pulse: 107 115 114 106   Temp:       Resp: 16 18 15 15   Height:       Weight: 75.5 kg (166 lb 7.2 oz)      SpO2: 98% 99% 97% 99%   Pulse Oximetry: 99 %, O2 (LPM): 2, O2 Delivery: Silicone Nasal Cannula      Intake/Output Summary (Last 24 hours) at 08/13/17 0830  Last data filed at 08/13/17 0600   Gross per 24 hour   Intake 4341.5 ml   Output   2110 ml   Net 2231.5 ml     DIET:   Orders Placed This Encounter   Procedures   • DIET NPO     Standing Status: Standing      Number of Occurrences: 1      Standing Expiration Date:      Order Specific Question:  Restrict to:     Answer:  Strict [1]       MEDS:    Current facility-administered medications:   •  guaiFENesin  •  levalbuterol  •  phosphorus  •  oxycodone immediate-release  •  levalbuterol  •  desmopressin  •  lactulose  •  enoxaparin (LOVENOX) injection  •  [COMPLETED] Rally Bag infusion **AND** thiamine **AND** folic acid **AND** multivitamin  •  Respiratory Care per Protocol  •  MD ALERT...Phenobarbital Alcohol Withdrawal Protocol Pharmacist to Implement (ICU Only)  •  Pharmacy **AND** If RASS 1 to 4: assess RASS every 30 minutes until RASS returns to goal sedation (RASS -2 to 0) **AND** If  RASS -2 to 0: assess RASS every 30 minutes for 2 occurrences and then every 4 hours **AND** If RASS -5 to -3: assess RASS, BP, and RR every 15 minutes with Continuous Pulse Oximetry until RASS returns to goal sedation (RASS -2 to 0) **AND** Pulse ox (oximetry) **AND** PHENObarbital **AND** PHENObarbital  •  ampicillin-sulbactam (UNASYN) IV  •  ipratropium-albuterol  •  hydrocodone/acetaminophen  •  nicotine  •  ondansetron  •  ondansetron  •  promethazine  •  promethazine  •  prochlorperazine    PE:  General: Moaning, lying back in bed with NG tube and O2 in place. Did not respond to questions.   HEENT: Normocephalic, atraumatic. Mucous membranes dry, with thick/blood tinged sputum in mouth. Very poor dentition  Cardiovascular: RRR, normal S1/S2, no extra heart sounds appreciated  Respiratory: Symmetric chest rise. Minimal diffuse rhonchi.  Abdomen: normoactive bowel sounds, non-distended, soft, non-tender, no masses, no rebound  EXT: LAIRD, No C/C/E  Neuro: no focal deficits, moving all extremties, does not answer questions or follow commands.     LABS:  Lab Results   Component Value Date    WBC 11.2* 2017    HEMOGLOBIN 12.0* 2017    HEMATOCRIT 36.1* 2017    PLATELETCT 118* 2017    MCV 99.2* 2017     Lab Results   Component Value Date    SODIUM 139 2017    POTASSIUM 4.7 2017    CHLORIDE 106 2017    CO2 26 2017    BUN 22 2017    CREATININE 1.12 2017    GLUCOSE 93 2017     Recent Labs      17   0015  17   0534  17   1208  17   1745   POCGLUCOSE  160*  116*  129*  106*     IMAGIN/13 CXR   1.  Ongoing left perihilar infiltrate compatible with pneumonia.  2.  Streaky atelectatic infiltrate at the right lung base with some interval increase in linear atelectatic     ASSESSMENT/PLAN:  Victor Manuel Glasgow is a 61 y.o. male admitted with metastatic cancer of unknown primary, likely lung, then with worsening altered mental  status, hypercalcemia, and severe hypernatremia, all resolved aside form altered mental status.     Neuro    Altered mental status, Alcoholism, alcohol withdrawal  - altered mentation, etiology unclear, moaning this AM, not answering questions.  - on admission stated 2-3 beers nightly but per wife pt drinks 12 pack daily  - Possible alcohol withdrawal, vs metabolic or wernickes encephalopathy, vs intracranial mets  - agitation and wanting to leave AMA started 14 hours after admission (and presumably his last drink)  - tachycardia started about 48 hours after admit  - no acute intracranial process on CT head without contrast   - Consider MRI brain for more accurate scan  - workup for sepsis unremarkable aside from pna on CXR.  - not hypercapneic on ABG 8/10/17              - continue phenobarb by protocol in ICU    Cardiac    sinus tachycardia  - Etiology unclear,  this AM  - No other signs of sepsis  - On unasyn (for aspiration pna)  - Stable on 2L O2  - Consider CT chest to r/o other etiology ie PE      Respiratory/Infectious Disease      Aspiration pneumonia  - WBC down to 11.2 this AM  - UA wnl, blood cultures NGTD  - CXR with perihilar infiltrates  - Most likely aspiration pna              - continue unasyn started 8/10 by pulm    Tobacco user              - nicotine patch              - RT protocol              - xopenex q4      GI/Nutrition    Poor nutrition  - NG tube placed and tube feeds started, at goal   - Free water 300mL every 4 hours.  - Rally bag IV once and thiamine, folate, multivitamin daily    Abd pain  - improved after BM              - continue lactulose      Renal/Electrolytes    Hypernatremia  - acute onset, from 132 on admit to 163, now WNL with na of 139 this AM  - uncertain if he is symptomatic since altered mental status was present prior to this  - urine lytes show very dilute urine, Mostly likely diabetes insipidus (central, from likely brain mass)  - urine more concentrated after  trial of DDAVP  - nephrology consult placed, appreciate recs              - continue DDAVP 2mcg BID              - As Na now WNL, plan to monitor BID    Hypercalcemia  - Ca at 13.9 on outpatient labs, up to 14.0 on 8/10, down to 8.9 this AM  - Possible cause of pain/AMS?  - nephrology consult placed as above              - s/p pamidronate 8/10, takes 2 days to peak effectiveness              - continue calcitonin per their protocol              - continue to hydrate free water through NGT    Renal Insufficiency, resolved  - admit creatinine 2.6, today improved to 1.12  - unclear chronic or acute. Baseline unknown, but PCP notes do not mention chronic kidney disease              - monitor with sodium as above      Heme/Onc    Metastatic disease  - CT Scan 8/05/2017 with mass in left upper lobe, likely primary lung cancer. Mets were noted in liver, bone, and maybe adrenal glands. New diagnosis.  - alk phos and calcium elevated due to bony involvement  - 30lb weight loss and poor appetite  - head CT negative for intracranial mets  - Ddx: metastatic lung cancer, prostate cancer, or melanoma. Has not had screening tests.              - will plan for biopsy and then oncology involvement              - for biopsy source consider IR doing CT guided bone bx vs pulm doing bronch. Both good options.              - concern for small intracranial mets: will do MRI brain with and without              - workup deferred for now due to critically ill    FEN/Prophy/Core    - IVF: None  - Electrolytes: hypercalcemia and hypernatremia  - Diet: NPO, tube feeds with free water  - bowel regimen: continue lactulose  - DVT prophy: heparin subQ  - GI prophy: not indicated  - Lines: multiple PIV  - Tubes: larios placed 8/10 and NG tube placed 8/11  - Code: DNR/DNI    Dispo: Inpatient for altered mental status, further workup of likely metastatic lung cancer, treatment of hypercalcemia and hypernatremia

## 2017-08-13 NOTE — CARE PLAN
Problem: Bronchoconstriction:  Goal: Improve in air movement and diminished wheezing  Outcome: PROGRESSING SLOWER THAN EXPECTED  Respiratory Therapy Update    Interdisciplinary Plan of Care-Goals (Indications)  Obstructive Ventilatory Defect or Pulmonary Disease without Obvious Obstruction: History / Diagnosis   Interdisciplinary Plan of Care-Outcomes   Bronchodilator Outcome: Improved Patient Appearance with Decreased use of Accessory Muscles       #SVN Performed: Yes     Cough: Congested;Productive   Sputum Amount: Small   Sputum Color: Bloody   Sputum Consistency: Thick           O2 (LPM): 2   O2 Daily Delivery Respiratory : Silicone Nasal Cannula    Breath Sounds  Pre/Post Intervention: Pre Intervention Assessment   RUL Breath Sounds: Coarse Crackles;Rhonchi   RML Breath Sounds: Coarse Crackles;Rhonchi  RLL Breath Sounds: Diminished   CAROLYN Breath Sounds: Coarse Crackles;Rhonchi  LLL Breath Sounds: Diminished     Therapy changed to   Events/Summary/Plan: NT suction Pt PRN. NPA no longer in place.

## 2017-08-14 NOTE — PROGRESS NOTES
[facility] FAMILY MEDICINE PROGRESS NOTE     Attending: Dr. Holly    Resident: Dr. Alvarado    PATIENT: Victor Manuel Glasgow; [id number]; 1955 HD: #[id number]    ID: 61 y.o. male admitted for finding of metastatic masses on CT and hypercalcemia, now transferred to ICU for altered mental status and acute hypernatremia.    SUBJECTIVE: Over last 24hrs patient became hypotensive and subsequently had central line placed and was started on pressors, this morning he was easily awoken and would answer basic questions before becoming confused and unable to answer further questions, denies pain but nursing reports he cries out in pain when ever they try to move him.     OBJECTIVE:     Filed Vitals:    08/14/17 0656 08/14/17 0700 08/14/17 0800 08/14/17 0900   BP:       Pulse: 111 107 107 115   Temp:       Resp: 15 12 12 15   Height:       Weight:       SpO2: 99% 99% 99% 98%       Intake/Output Summary (Last 24 hours) at 08/14/17 1023  Last data filed at 08/14/17 1000   Gross per 24 hour   Intake 4192.87 ml   Output   2510 ml   Net 1682.87 ml       PE:  General: No acute distress, sleeping in bed, AO to place  HEENT: NC/AT, MMM  Cardiovascular: RRR with no M/R/G.  Respiratory: Symmetrical chest. CTAB but poor inspiratory effort making exam difficult  Abdomen: soft, NT/ND, no masses, +BS   EXT:  LAIRD, No C/C/E 2+ pulses   Neuro: followed basic commands, moving all ext.    LABS:  Recent Labs      08/12/17   0529  08/13/17   0500   WBC  10.5  11.2*   RBC  3.70*  3.64*   HEMOGLOBIN  11.8*  12.0*   HEMATOCRIT  35.8*  36.1*   MCV  96.8  99.2*   MCH  31.9  33.0   RDW  45.1  45.0   PLATELETCT  135*  118*   MPV  9.6  9.9     Recent Labs      08/12/17   0529   08/12/17   2340  08/13/17   0500  08/13/17   1720   SODIUM  147*   < >  136  139  141   POTASSIUM  3.7   < >  4.5  4.7  3.6   CHLORIDE  115*   < >  107  106  107   CO2  26   < >  22  26  27   BUN  19   < >  19  22  27*   CREATININE  1.06   < >  1.09  1.12  1.19   CALCIUM  9.5   < >  8.7  8.9  8.2*    MAGNESIUM  1.3*   --    --   2.0   --    PHOSPHORUS  1.8*   --    --   3.1   --    ALBUMIN  2.4*   --    --   2.5*   --     < > = values in this interval not displayed.     Estimated GFR/CRCL = Estimated Creatinine Clearance: 68.3 mL/min (by C-G formula based on Cr of 1.19).  Recent Labs      17   0534   17   1208   17   1745  17   2340  17   0500  17   1720   GLUCOSE   --    < >   --    < >   --   108*  93  101*   POCGLUCOSE  116*   --   129*   --   106*   --    --    --     < > = values in this interval not displayed.     Recent Labs      17   0529  17   0500  17   1720   ASTSGOT  18  32   --    ALTSGPT  13  14   --    TBILIRUBIN  0.3  0.3   --    ALKPHOSPHAT  206*  328*   --    GLOBULIN  3.3  3.5   --    AMMONIA  49*   --   25               Invalid input(s): [id number]  No results for input(s): INR, APTT, FIBRINOGEN in the last 72 hours.    Invalid input(s): DIMER    MICROBIOLOGY: Blood culture: NGTD    IMAGIN/14 CXR: 1. No significant interval change.   CXR:  1.  Ongoing left perihilar infiltrate compatible with pneumonia.  2.  Streaky atelectatic infiltrate at the right lung base with some interval increase in linear atelectatic     MEDS:  Current Facility-Administered Medications   Medication Last Dose   • guaiFENesin (ROBITUSSIN) 100 MG/5ML solution 200 mg 200 mg at 17 1009   • acetaminophen (TYLENOL) tablet 650 mg 650 mg at 17 1012   • norepinephrine (LEVOPHED) 8 mg in  mL Infusion 7 mcg/min at 17 0720   • lactated ringers infusion     • levalbuterol (XOPENEX) 1.25 MG/3ML nebulizer solution 1.25 mg     • phosphorus (K-PHOS-NEUTRAL, PHOSPHA 250 NEUTRAL) per tablet 1 Tab 1 Tab at 17 0507   • oxycodone immediate release (ROXICODONE) tablet 10 mg 10 mg at 17 0507   • levalbuterol (XOPENEX) 1.25 MG/3ML nebulizer solution 1.25 mg 1.25 mg at 17 0656   • desmopressin (DDAVP) injection 2 mcg 2 mcg at  08/14/17 0900   • lactulose 20 GM/30ML solution 30 mL 30 mL at 08/14/17 1009   • enoxaparin (LOVENOX) inj 40 mg 40 mg at 08/14/17 1009   • Respiratory Care per Protocol     • ampicillin/sulbactam (UNASYN) 3 g in  mL IVPB Stopped at 08/14/17 0545   • ipratropium-albuterol (DUONEB) nebulizer solution 3 mL     • hydrocodone/acetaminophen (NORCO)  MG per tablet 1 Tab 1 Tab at 08/14/17 0555   • nicotine (NICODERM) 21 MG/24HR 21 mg 21 mg at 08/14/17 0507   • ondansetron (ZOFRAN) syringe/vial injection 4 mg     • ondansetron (ZOFRAN ODT) dispertab 4 mg 4 mg at 08/07/17 2014   • promethazine (PHENERGAN) tablet 12.5-25 mg     • promethazine (PHENERGAN) suppository 12.5-25 mg     • prochlorperazine (COMPAZINE) injection 5-10 mg         ASSESSMENT/PLAN:  Victor Manuel Glasgow is a 61 y.o. male admitted with metastatic cancer of unknown primary, likely lung, then with worsening altered mental status, hypercalcemia, and severe hypernatremia, all resolved aside from altered mental status.     Neuro    # Altered mental status,  - altered mentation, etiology unclear, has improved over night and now answering basic questions.  - Does have hx of ETOH use, possibly 12 pack daily, no other signs of withdraw so altered status may not be related to alcohol use.  - CT with no mets evident  - Hypernatremia resolved  - Unclear of exact etiology, has had fevers, electrolyte abnormalities, and hypotension, may need to consider adrenal insufficiency              - continue phenobarb by protocol in ICU for now   - Monitor electrolytes   - Continue abx for pna   - May consider am cortisol tomorrow    Cardiac    # sinus tachycardia  - Etiology unclear, 110's-120's overnight with occasional PVC's  - No other signs of sepsis  - On unasyn (for aspiration pna)  - Stable on 2L O2, on lovenox prophylaxis so PE is less likely   - Continue to monitor      Respiratory/Infectious Disease      # Aspiration pneumonia  - WBC trending down  - UA wnl,  blood cultures NGTD  - CXR with perihilar infiltrates  - Most likely aspiration pna  - Febrile overnight, but overall clinically improved              - continue unasyn started 8/10 by pulm    # Tobacco user              - nicotine patch              - RT protocol              - xopenex q4      GI/Nutrition    # Poor nutrition  - NG tube placed and tube feeds started, at goal   - Free water 300mL every 4 hours.  - Rally bag IV once and thiamine, folate, multivitamin daily        Renal/Electrolytes    # Hypernatremia  - acute onset, from 132 on admit to 163, now WNL   - uncertain if he is symptomatic since altered mental status was present prior to this and now improving  - urine lytes show very dilute urine, possibly diabetes insipidus   - urine more concentrated after trial of DDAVP  - nephrology consult placed, appreciate recs              - continue DDAVP 2mcg BID              - Daily BMP   - Consider further work up if labs worsen    # Hypercalcemia  - Ca at 13.9 on outpatient labs, up to 14.0 on 8/10, now WNL  - Possible cause of pain/AMS?  - nephrology consult placed as above              - s/p pamidronate 8/10, takes 2 days to peak effectiveness              - continue to hydrate free water through NGT    # Renal Insufficiency, resolved  - admit creatinine 2.6, today improved to 1.12  - unclear chronic or acute. Baseline unknown, but PCP notes do not mention chronic kidney disease              - monitor with sodium as above      Heme/Onc    #Metastatic disease  - CT Scan 8/05/2017 with mass in left upper lobe, likely primary lung cancer. Mets were noted in liver, bone, and maybe adrenal glands. New diagnosis.  - alk phos and calcium elevated due to bony involvement  - 30lb weight loss and poor appetite  - head CT negative for intracranial mets  - Ddx: metastatic lung cancer, prostate cancer, or melanoma. Has not had screening tests.              - will plan for biopsy and then oncology  involvement              - for biopsy source consider IR doing CT guided bone bx vs pulm doing bronch. Both good options.              - concern for small intracranial mets: will do MRI brain with and without when able              - workup deferred for now due to critically ill    FEN/Prophy/Core    - IVF: None  - Electrolytes: hypercalcemia and hypernatremia  - Diet: NPO, tube feeds with free water  - bowel regimen: continue lactulose  - DVT prophy: heparin subQ  - GI prophy: not indicated  - Lines: multiple PIV  - Tubes: larios placed 8/10 and NG tube placed 8/11  - Code: DNR/DNI    Dispo: Inpatient for altered mental status, further workup of likely metastatic lung cancer, treatment of hypercalcemia and hypernatremia

## 2017-08-14 NOTE — PROGRESS NOTES
Pulmonary Critical Care Progress Note      Date of service:  8/14/2017      Interval Events:  24 hour interval history reviewed  Reason for visit:  Aspiration pneumonia, alcohol withdrawal delirium  Unable to provide CC or ROS due to medical condition    - remains confused  -     PFSH:  No change.    Respiratory:     Pulse Oximetry: 99 %  CXR personally reviewed  CXR with increased left perihilar opacification and RLL SSA  Coarse crackles , diminished  Int NT suctioning          Invalid input(s): [id number]    HemoDynamics:  Pulse: (!) 107, Heart Rate (Monitored): (!) 106  NIBP: 103/57 mmHg     ST           Neuro:  Int agitated agitated and aggressive at times  Phenobarbital protocol    Fluids:  Intake/Output       08/12/17 0700 - 08/13/17 0659 08/13/17 0700 - 08/14/17 0659 08/14/17 0700 - 08/15/17 0659      1775-9561 0142-8082 Total 4189-4054 2009-4712 Total 3299-7940 5675-3711 Total       Intake    I.V.  2499.8  200 2699.8  600  311.6 911.6  --  -- --    Magnesium Sulfate Volume 150 -- 150 -- -- -- -- -- --    Norepinephrine Volume -- -- -- -- 111.6 111.6 -- -- --    IV Volume (D5W) 1650 -- 1650 -- -- -- -- -- --    IV Volume (Potassium Phos) 499.8 -- 499.8 -- -- -- -- -- --    IV Piggyback Volume 200 200 400 600 200 800 -- -- --    Other  100  30 130  60  120 180  --  -- --    Medications (P.O./ Enteral Liquids) 100 30 130 60 120 180 -- -- --    Enteral  1580  870 2450  1240  1270 2510  --  -- --    Enteral Volume   -- -- --    Free Water / Tube Flush 800 90 890 460 490 950 -- -- --    Total Intake 4179.8 1100 5279.8 1900 1701.6 3601.6 -- -- --       Output    Urine  950  900 1850  1050  1300 2350  --  -- --    Indwelling Cathether  1050 1300 2350 -- -- --    Drains  260  200 460  400  140 540  --  -- --    Rectal Tube 260 200 460 400 140 540 -- -- --    Stool  --  -- --  --  -- --  --  -- --    Number of Times Stooled 3 x -- 3 x -- -- -- -- -- --    Total Output 1210 1100  2310 1450 1440 2890 -- -- --       Net I/O     2969.8 0 2969.8 450 261.6 711.6 -- -- --        Weight: 76 kg (167 lb 8.8 oz)  Recent Labs      17   0517   0500  17   1720   SODIUM  147*   < >  136  139  141   POTASSIUM  3.7   < >  4.5  4.7  3.6   CHLORIDE  115*   < >  107  106  107   CO2  26   < >  22  26  27   BUN  19   < >  19  22  27*   CREATININE  1.06   < >  1.09  1.12  1.19   MAGNESIUM  1.3*   --    --   2.0   --    PHOSPHORUS  1.8*   --    --   3.1   --    CALCIUM  9.5   < >  8.7  8.9  8.2*    < > = values in this interval not displayed.       GI/Nutrition:  Abd soft ND/NT  Tolerating enteral TF    Liver Function  Recent Labs      17   0500  17   1720  17   234   ALTSGPT  13   --    --   14   --    --    ASTSGOT  18   --    --   32   --    --    ALKPHOSPHAT  206*   --    --   328*   --    --    TBILIRUBIN  0.3   --    --   0.3   --    --    PREALBUMIN   --    --    --    --    --   <3.0*   GLUCOSE  121*   < >  108*  93  101*   --     < > = values in this interval not displayed.       Heme:  Recent Labs      17   050   RBC  3.70*  3.64*   HEMOGLOBIN  11.8*  12.0*   HEMATOCRIT  35.8*  36.1*   PLATELETCT  135*  118*       Infectious Disease:  Monitored Temp  Av.3 °C (101 °F)  Min: 37.6 °C (99.7 °F)  Max: 38.9 °C (102 °F)    Recent Labs      17   0500   WBC  10.5  11.2*   ASTSGOT  18  32   ALTSGPT  13  14   ALKPHOSPHAT  206*  328*   TBILIRUBIN  0.3  0.3     Current Facility-Administered Medications   Medication Dose Frequency Provider Last Rate Last Dose   • guaiFENesin (ROBITUSSIN) 100 MG/5ML solution 200 mg  10 mL Q6HRS Ananda Jo M.D.   200 mg at 17 0338   • acetaminophen (TYLENOL) tablet 650 mg  650 mg Q6HRS PRN Ananda Jo M.D.   650 mg at 17 4923   • norepinephrine (LEVOPHED) 8 mg in  mL Infusion  0-30 mcg/min Continuous  Ananda Jo M.D. 13.1 mL/hr at 08/14/17 0720 7 mcg/min at 08/14/17 0720   • lactated ringers infusion   Continuous Ananda Jo M.D. 100 mL/hr at 08/14/17 0700     • levalbuterol (XOPENEX) 1.25 MG/3ML nebulizer solution 1.25 mg  1.25 mg Q2HRS PRN (RT) Ananda Jo M.D.       • phosphorus (K-PHOS-NEUTRAL, PHOSPHA 250 NEUTRAL) per tablet 1 Tab  1 Tab Q6HRS Ananda Jo M.D.   1 Tab at 08/14/17 0507   • oxycodone immediate release (ROXICODONE) tablet 10 mg  10 mg Q6HRS Ananda Jo M.D.   10 mg at 08/14/17 0507   • levalbuterol (XOPENEX) 1.25 MG/3ML nebulizer solution 1.25 mg  1.25 mg 4X/DAY (RT) Ananda Jo M.D.   1.25 mg at 08/14/17 0656   • desmopressin (DDAVP) injection 2 mcg  2 mcg Q12HRS Jessika Carlson M.D.   2 mcg at 08/13/17 2049   • lactulose 20 GM/30ML solution 30 mL  30 mL BID Stacy Gallegos M.D.   Stopped at 08/13/17 2100   • enoxaparin (LOVENOX) inj 40 mg  40 mg DAILY Ananda Jo M.D.   40 mg at 08/13/17 0959   • thiamine (THIAMINE) tablet 100 mg  100 mg DAILY Stacy Gallegos M.D.   100 mg at 08/13/17 0959    And   • folic acid (FOLVITE) tablet 1 mg  1 mg DAILY Stacy Gallegos M.D.   1 mg at 08/13/17 0959    And   • multivitamin (THERAGRAN) tablet 1 Tab  1 Tab DAILY Stacy Gallegos M.D.   1 Tab at 08/13/17 0959   • Respiratory Care per Protocol   Continuous RT Stacy Gallegos M.D.       • MD ALERT...Phenobarbital Alcohol Withdrawal Protocol Pharmacist to Implement (ICU Only)   PRN Ananda Jo M.D.       • Pharmacy Consult Request 1 Each  1 Each PRN Gurpreetglory Penaloza PHARMD        And   • PHENObarbital injection 130 mg  130 mg Q30 MIN PRN Gurpreetglory Penaloza PHARMD   130 mg at 08/12/17 1033    And   • PHENObarbital injection 260 mg  260 mg Q30 MIN PRN Gurpreetglory Penaloza PHARMD       • ampicillin/sulbactam (UNASYN) 3 g in  mL IVPB  3 g Q6HRS Ananda Jo M.D.   Stopped at 08/14/17 0545   • ipratropium-albuterol (DUONEB)  nebulizer solution 3 mL  3 mL Q4H PRN (RT) Ananda Jo M.D.       • hydrocodone/acetaminophen (NORCO)  MG per tablet 1 Tab  1 Tab Q3HRS PRN Stacy Gallegos M.D.   1 Tab at 08/14/17 0555   • nicotine (NICODERM) 21 MG/24HR 21 mg  21 mg Daily-0600 Stacy Gallegos M.D.   21 mg at 08/14/17 0507   • ondansetron (ZOFRAN) syringe/vial injection 4 mg  4 mg Q4HRS PRN Stacy Gallegos M.D.       • ondansetron (ZOFRAN ODT) dispertab 4 mg  4 mg Q4HRS PRN Stacy Gallegos M.D.   4 mg at 08/07/17 2014   • promethazine (PHENERGAN) tablet 12.5-25 mg  12.5-25 mg Q4HRS PRN Stacy Gallegos M.D.       • promethazine (PHENERGAN) suppository 12.5-25 mg  12.5-25 mg Q4HRS PRN Stacy Gallegos M.D.       • prochlorperazine (COMPAZINE) injection 5-10 mg  5-10 mg Q4HRS PRN Stacy Gallegos M.D.         Last reviewed on 8/7/2017  6:55 PM by Nnamdi Wilson    Quality  Measures:  Labs reviewed, Medications reviewed and Radiology images reviewed                        Assessment and Plan:    Acute unspecified encephalopathy   - toxic metabolic encephalopathy due to alcohol withdrawal delirium   - ? Paraneoplastic, ? Mets ? ICU delirium   Alcohol withdrawal delirium   - phenobarbital protocol   - completing  Aspiration pneumonia   - Unasyn   - mucolytic  Left hilar mass highly suspicious for bronchogenic carcinoma with evidence of liver and bony metastases   - tissue diagnosis when improved; d/w family  Hypercalcemia due to bony metastases    - improved with hydration, calcitonin    - stopped calcitonin    - pamidronate  Acute kidney injury - improved with hydration  Hypernatremia - consistent with central diabetes insipidus   - resolved   - decrease free water   - follow Na closely   - DDAVP   - MRI of brain when he is improved  Back pain due to osseous metastases   - cont analgesia  Systemic arterial hypertension   - cont BP control  Dyslipidemia  Tobacco abuse  Alcohol abuse   - vitamins  DNR/DNI    Critically ill.  High risk  for deterioration and worsening vital organ dysfunction.  Keep in ICU.  The patient remains critically ill.  Critical care time = 32 minutes in directly providing and coordinating critical care and extensive data review.  No time overlap and excludes procedures.

## 2017-08-14 NOTE — CARE PLAN
Problem: Knowledge Deficit  Goal: Knowledge of disease process/condition, treatment plan, diagnostic tests, and medications will improve  Outcome: PROGRESSING SLOWER THAN EXPECTED  Educated pt about current status and treatment plans. Pt unable to verbalize or demonstrate understanding.    Problem: Pain Management  Goal: Pain level will decrease to patient’s comfort goal  Outcome: PROGRESSING AS EXPECTED  Pt is unable to verbalize pain but displays discomfort/pain with sighing and moaning noise, especially with movement. Medicated per MAR, repositioned Q2H and as needed, provided decreased stimulation and quiet area. Pt able to rest.

## 2017-08-14 NOTE — CARE PLAN
Problem: Pain Management  Goal: Pain level will decrease to patient’s comfort goal  Intervention: Follow pain managment plan developed in collaboration with patient and Interdisciplinary Team  Pain assessed every two hours and PRN.  Alternative interventions implemented if needed to reduce pain level.  PRN medications also an intervention if needed to reduce pain.  Will continue to assess and monitor.      Problem: Skin Integrity  Goal: Risk for impaired skin integrity will decrease  Intervention: Assess risk factors for impaired skin integrity and/or pressure ulcers  Skin assessed at beginning of shift and every shift. Nutrition evaluated. Moisturizer assessed . Sensory assessed. Activity assessed. Mobility addressed with patient and family. And friction and shear addressed. Jos score completed and documented.  Will continue to assess and monitor

## 2017-08-14 NOTE — CARE PLAN
Problem: Nutritional:  Goal: Nutrition support tolerated and meeting greater than 85% of estimated needs  Outcome: MET Date Met:  08/14/17

## 2017-08-14 NOTE — CARE PLAN
Problem: Oxygenation:  Goal: Maintain adequate oxygenation dependent on patient condition  Oxymask @ 2lpm as is a mouth breather    Problem: Bronchoconstriction:  Goal: Improve in air movement and diminished wheezing  Outcome: PROGRESSING AS EXPECTED  QID Xopenex.  Has not required NT suction this shift.

## 2017-08-14 NOTE — CARE PLAN
Problem: Oxygenation:  Goal: Maintain adequate oxygenation dependent on patient condition  2L OXYMASK    Problem: Bronchoconstriction:  Goal: Improve in air movement and diminished wheezing  Xopenex QID    Problem: Bronchopulmonary Hygiene:  Goal: Increase mobilization of retained secretions  Nasal trumpet in. Frequent NT SX as indicated.

## 2017-08-15 NOTE — PROGRESS NOTES
"Per Arash (nurse) Ct Biopsy on hold still as patient not stable enough for procedure-\"family may decide to do comfort care\". rlk  "

## 2017-08-15 NOTE — PROGRESS NOTES
"[facility] PROGRESS NOTE     Attending: Dr. Holly    Resident: Dr. Alvarado    PATIENT: Victor Manuel Glasgow; [id number]; 1955    ID: 61 y.o. male admitted for finding of metastatic masses on CT and hypercalcemia, now transferred to ICU for altered mental status and acute hypernatremia.    SUBJECTIVE: Over last 24hrs mental status has slowly improved, still requiring pressors but with decreasing need, nursing reports possible blood clots in rectal tube as well as increased agitation, has reported to nursing he wants to be \"put in the ground\", patient alert and oriented upon exam today and was requesting a drink of water and if we could remove the restraints.     OBJECTIVE:     Filed Vitals:    08/15/17 0300 08/15/17 0400 08/15/17 0500 08/15/17 0600   BP:       Pulse: 110 107 71 90   Temp:       Resp: 28 31 21 18   Height:       Weight:  75.9 kg (167 lb 5.3 oz)     SpO2: 94% 95% 95% 94%       Intake/Output Summary (Last 24 hours) at 08/15/17 0748  Last data filed at 08/15/17 0600   Gross per 24 hour   Intake 5366.38 ml   Output   3535 ml   Net 1831.38 ml       PE:  General: No acute distress, restless in bed in restraints, AO to person and place  HEENT: NC/AT. PERRLA. EOMI. MMM  Cardiovascular: RRR with no M/R/G.  Respiratory: Symmetrical chest. CTAB with no W/R/R  Abdomen: soft, NT/ND, no masses, +BS   EXT:  LAIRD, No C/C/E 2+ pulses   Neuro: following basic commands, moving all ext.    LABS:  Recent Labs      08/13/17   0500  08/14/17   1340  08/15/17   0445   WBC  11.2*  13.1*  11.0*   RBC  3.64*  3.06*  2.83*   HEMOGLOBIN  12.0*  9.9*  9.2*   HEMATOCRIT  36.1*  29.4*  27.6*   MCV  99.2*  96.1  97.5   MCH  33.0  32.4  32.5   RDW  45.0  43.9  44.7   PLATELETCT  118*  101*  100*   MPV  9.9  10.2  10.8     Recent Labs      08/13/17   0500   08/14/17   1025  08/14/17   2210  08/15/17   0445   SODIUM  139   < >  141  144  143   POTASSIUM  4.7   < >  3.6  3.1*  3.1*   CHLORIDE  106   < >  106  109  110   CO2  26   " < >  28  28  27   BUN  22   < >  24*  20  21   CREATININE  1.12   < >  1.06  1.00  0.98   CALCIUM  8.9   < >  7.9*  7.4*  7.7*   MAGNESIUM  2.0   --   1.5   --   1.5   PHOSPHORUS  3.1   --   2.8   --   2.9   ALBUMIN  2.5*   --   2.1*   --   2.0*    < > = values in this interval not displayed.     Estimated GFR/CRCL = Estimated Creatinine Clearance: 82.8 mL/min (by C-G formula based on Cr of 0.98).  Recent Labs      17   1208   17   1745   17   1025  17   2210  08/15/17   0445   GLUCOSE   --    < >   --    < >  99  104*  120*   POCGLUCOSE  129*   --   106*   --    --    --    --     < > = values in this interval not displayed.     Recent Labs      17   0500  17   1720  17   1025  08/15/17   0445   ASTSGOT  32   --   29  19   ALTSGPT  14   --   16  14   TBILIRUBIN  0.3   --   0.3  0.3   ALKPHOSPHAT  328*   --   502*  373*   GLOBULIN  3.5   --   3.3  3.2   AMMONIA   --   25   --    --                Invalid input(s): [id number]  No results for input(s): INR, APTT, FIBRINOGEN in the last 72 hours.    Invalid input(s): DIMER    MICROBIOLOGY: Blood culture: NGTD    IMAGIN/15 CXR: 1. No significant interval change.   CXR: 1. No significant interval change.   CXR:  1.  Ongoing left perihilar infiltrate compatible with pneumonia.  2.  Streaky atelectatic infiltrate at the right lung base with some interval increase in linear atelectatic      CT with multiple metastatic lesions and likely primary lung cancer    MEDS:  Current Facility-Administered Medications   Medication Last Dose   • fentaNYL (SUBLIMAZE) injection 25-50 mcg 50 mcg at 17 5087   • guaiFENesin (ROBITUSSIN) 100 MG/5ML solution 200 mg 200 mg at 08/15/17 0308   • acetaminophen (TYLENOL) tablet 650 mg 650 mg at 17 1636   • norepinephrine (LEVOPHED) 8 mg in  mL Infusion 8 mcg/min at 08/15/17 0633   • lactated ringers infusion     • levalbuterol (XOPENEX) 1.25 MG/3ML nebulizer solution  1.25 mg     • phosphorus (K-PHOS-NEUTRAL, PHOSPHA 250 NEUTRAL) per tablet 1 Tab 1 Tab at 08/15/17 0520   • oxycodone immediate release (ROXICODONE) tablet 10 mg 10 mg at 08/15/17 0520   • levalbuterol (XOPENEX) 1.25 MG/3ML nebulizer solution 1.25 mg 1.25 mg at 08/15/17 0739   • desmopressin (DDAVP) injection 2 mcg 2 mcg at 08/14/17 2100   • lactulose 20 GM/30ML solution 30 mL 30 mL at 08/14/17 2023   • enoxaparin (LOVENOX) inj 40 mg 40 mg at 08/14/17 1009   • Respiratory Care per Protocol     • ampicillin/sulbactam (UNASYN) 3 g in  mL IVPB Stopped at 08/15/17 0550   • ipratropium-albuterol (DUONEB) nebulizer solution 3 mL     • hydrocodone/acetaminophen (NORCO)  MG per tablet 1 Tab 1 Tab at 08/15/17 0159   • nicotine (NICODERM) 21 MG/24HR 21 mg 21 mg at 08/15/17 0520   • ondansetron (ZOFRAN) syringe/vial injection 4 mg     • ondansetron (ZOFRAN ODT) dispertab 4 mg 4 mg at 08/07/17 2014   • promethazine (PHENERGAN) tablet 12.5-25 mg     • promethazine (PHENERGAN) suppository 12.5-25 mg     • prochlorperazine (COMPAZINE) injection 5-10 mg         ASSESSMENT/PLAN:  Victor Manuel Glasgow is a 61 y.o. male admitted with metastatic cancer of unknown primary, likely lung, then with worsening altered mental status, hypercalcemia, and severe hypernatremia, all resolved aside from altered mental status.     Neuro    # Altered mental status,  - altered mentation, etiology unclear, has been slowly improving  - Does have hx of ETOH use, possibly 12 pack daily, no other signs of withdraw so altered status may not be related to alcohol use.  - CT with no mets evident  - Electrolyte imbalances resolved  - Unclear of exact etiology, has had fevers, electrolyte abnormalities, and hypotension, may need to consider adrenal insufficiency              - Monitor electrolytes              - Continue abx for pna              - Monitor for continued improving mental status   - Trial of Seroquel     Cardiac    # sinus tachycardia  -  Etiology unclear, 110's-120's with occasional PVC's  - No other signs of sepsis  - On unasyn (for aspiration pna)  - Stable on 2L O2, on lovenox prophylaxis so PE is less likely              - Continue to monitor      Respiratory/Infectious Disease      # Aspiration pneumonia  - WBC trending down  - UA wnl, blood cultures NGTD  - CXR with perihilar infiltrates  - Most likely aspiration pna  - Continues to be febrile              - continue unasyn started 8/10 by pulm    # Tobacco user              - nicotine patch              - RT protocol      GI/Nutrition    # Poor nutrition  - NG tube placed and tube feeds started, at goal   - Free water 300mL every 4 hours.  - Rally bag IV once and thiamine, folate, multivitamin daily        Renal/Electrolytes    # Hypernatremia (resolved)  - acute onset, from 132 on admit to 163, now WNL    - uncertain if he is symptomatic since altered mental status was present prior to this and now improving  - urine lytes show very dilute urine, possibly diabetes insipidus    - urine more concentrated after trial of DDAVP  - nephrology consult placed, appreciate recs              - continue DDAVP 2mcg BID              - Daily BMP              - Consider further work up if labs worsen    # Hypercalcemia (Resolved)  - Ca at 13.9 on outpatient labs, up to 14.0 on 8/10, now WNL  - Possible cause of pain/AMS?  - nephrology consult placed as above              - s/p pamidronate 8/10, takes 2 days to peak effectiveness              - continue to hydrate free water through NGT    # Renal Insufficiency, (resolved)  - admit creatinine 2.6, today improved to 1.12  - unclear chronic or acute. Baseline unknown, but PCP notes do not mention chronic kidney disease              - monitor with sodium as above      Heme/Onc    #Metastatic disease  - CT Scan 8/05/2017 with mass in left upper lobe, likely primary lung cancer. Mets were noted in liver, bone, and maybe adrenal glands. New diagnosis.  - alk phos  and calcium elevated due to bony involvement  - 30lb weight loss and poor appetite  - head CT negative for intracranial mets  - Ddx: metastatic lung cancer, prostate cancer, or melanoma. Has not had screening tests.              - will plan for biopsy and then oncology involvement              - for biopsy source consider IR doing CT guided bone bx vs pulm doing bronch. Both good options.              - concern for small intracranial mets: will do MRI brain with and without when able              - workup deferred for now due to critically ill    FEN/Prophy/Core    - IVF: None  - Electrolytes: hypercalcemia and hypernatremia  - Diet: NPO, tube feeds with free water  - bowel regimen: continue lactulose  - DVT prophy: heparin subQ  - GI prophy: not indicated  - Lines: multiple PIV  - Tubes: larios placed 8/10 and NG tube placed 8/11  - Code: DNR/DNI    Dispo: Inpatient for altered mental status, further workup of likely metastatic lung cancer, treatment of hypercalcemia and hypernatremia

## 2017-08-15 NOTE — PROCEDURES
Date: 8/15/2017    Procedure:  Emergent orotracheal intubation    Indication: Respiratory failure    Physician:  Dr. Benjie Rodriguez MD    Consent:  Emergent     Procedure:  This patient has developed increasing respiratory failure and requires emergent intubation.  His wife has consented to intubation and bronchoscopy.  RSI given with propofol and rocuronium.  Using a #4 glidescope, an 8.5 ETT was placed on the first attempt w/o complication.  Tube placement confirmed by direct visualization of placement, end-tidal CO2 monitor color change and equal breath sounds.  No immediate complications.  Vitals remained stable throughout the procedure.  A post-procedure CXR will be reviewed.

## 2017-08-15 NOTE — CARE PLAN
Problem: Oxygenation:  Goal: Maintain adequate oxygenation dependent on patient condition  2L Oxymask    Problem: Bronchoconstriction:  Goal: Improve in air movement and diminished wheezing  Xopenex QID

## 2017-08-15 NOTE — PROGRESS NOTES
Pulmonary Critical Care Progress Note        DOS:  8/15/2017    Chief Complaint: ALOC, bone mets    History of Present Illness: 61 y.o. M, h/o HTN, DLD, EtOH use, had MRI for back pain, found to have ACROLYN lung mass, ? CAROLYN carcinomatosis, bone and liver mets; admitted 8/7 with EtOH w/d, ALOC, JOSE L, hyponatremic    ROS:  Respiratory: unable to perform due to the patient's inability to effectively communicate, Cardiac: unable to perform due to the patient's inability to effectively communicate, GI: unable to perform due to the patient's inability to effectively communicate.  All other systems negative.    Interval Events:  24 hour interval history reviewed    - last phenobarb 8/12   - very confused, agitated, not following consistently   - d/w family today re: treatment plan   - increasing aspiration and resp distress   - wife agrees to short course intubation to facilitate dx and options    PFSH:  No change.    Respiratory:     Pulse Oximetry: 93 %  Chest Tube Drains:          Exam: rhonchi throughout all lung fields and diminished breath sounds moderate  ImagingAvailable data reviewed         Invalid input(s): [id number]    HemoDynamics:  Pulse: (!) 125, Heart Rate (Monitored): (!) 127  NIBP: 102/55 mmHg       Exam: sinus tachycardia  Imaging: Available data reviewed        Neuro:  GCS Total Jorge Coma Score: 12       Exam: Agitated Delirious  Imaging: Available data reviewed    Fluids:  Intake/Output       08/13/17 0700 - 08/14/17 0659 08/14/17 0700 - 08/15/17 0659 08/15/17 0700 - 08/16/17 0659      8942-1037 2818-5315 Total 4289-5874 3245-4138 Total 3616-1659 2733-0087 Total       Intake    I.V.  800  311.6 1111.6  1344.9  1271.5 2616.4  622.4  -- 622.4    Norepinephrine Volume -- 111.6 111.6 144.9 71.5 216.4 22.4 -- 22.4    IV Volume (LR) 200 -- 200 1000 1200 2200 600 -- 600    IV Piggyback Volume 600 200 800 200 -- 200 -- -- --    Other  60  120 180  60  150 210  30  -- 30    Medications (P.O./ Enteral  Liquids) 60 120 180 60 150 210 30 -- 30    Enteral  1240  1270 2510  1240  1300 2540  620  -- 620    Enteral Volume   390 -- 390    Free Water / Tube Flush 460 490 950 460 520 980 230 -- 230    Total Intake 2100 1701.6 3801.6 2644.9 2721.5 5366.4 1272.4 -- 1272.4       Output    Urine  1050  1300 2350  1500  985 2485  675  -- 675    Indwelling Cathether 1050 1300 2350 2772 278 3868 675 -- 675    Drains  400  140 540  500  550 1050  --  -- --    Rectal Tube 400 140 540  -- -- --    Total Output 1450 1440 2890 2000 1535 3535 675 -- 675       Net I/O     650 261.6 911.6 644.9 1186.5 1831.4 597.4 -- 597.4        Weight: 75.9 kg (167 lb 5.3 oz)  Recent Labs      08/13/17   0500   08/14/17   1025  08/14/17   2210  08/15/17   0445   SODIUM  139   < >  141  144  143   POTASSIUM  4.7   < >  3.6  3.1*  3.1*   CHLORIDE  106   < >  106  109  110   CO2  26   < >  28  28  27   BUN  22   < >  24*  20  21   CREATININE  1.12   < >  1.06  1.00  0.98   MAGNESIUM  2.0   --   1.5   --   1.5   PHOSPHORUS  3.1   --   2.8   --   2.9   CALCIUM  8.9   < >  7.9*  7.4*  7.7*    < > = values in this interval not displayed.       GI/Nutrition:  Exam: abdomen is soft and non-tender  Imaging: Available data reviewed  NPO and tube feed Tolerated  Liver Function  Recent Labs      08/13/17   0500   08/13/17   2349  08/14/17   1025  08/14/17   2210  08/15/17   0445   ALTSGPT  14   --    --   16   --   14   ASTSGOT  32   --    --   29   --   19   ALKPHOSPHAT  328*   --    --   502*   --   373*   TBILIRUBIN  0.3   --    --   0.3   --   0.3   PREALBUMIN   --    --   <3.0*   --    --    --    GLUCOSE  93   < >   --   99  104*  120*    < > = values in this interval not displayed.       Heme:  Recent Labs      08/13/17   0500  08/14/17   1340  08/15/17   0445   RBC  3.64*  3.06*  2.83*   HEMOGLOBIN  12.0*  9.9*  9.2*   HEMATOCRIT  36.1*  29.4*  27.6*   PLATELETCT  118*  101*  100*       Infectious Disease:  Monitored Temp   Av.1 °C (100.6 °F)  Min: 31.3 °C (88.3 °F)  Max: 39.3 °C (102.7 °F)  Micro: reviewed  Recent Labs      17   0500  17   1025  17   1340  08/15/17   0445   WBC  11.2*   --   13.1*  11.0*   ASTSGOT  32  29   --   19   ALTSGPT  14  16   --   14   ALKPHOSPHAT  328*  502*   --   373*   TBILIRUBIN  0.3  0.3   --   0.3     Current Facility-Administered Medications   Medication Dose Frequency Provider Last Rate Last Dose   • quetiapine (SEROQUEL) tablet 50 mg  50 mg TID Benjie Rodriguez M.D.   50 mg at 08/15/17 0900   • potassium chloride SA (Kdur) tablet 20 mEq  20 mEq TID Marta Swift, PHARMD   20 mEq at 08/15/17 0901   • MD ALERT...Adult ICU Electrolyte Replacement per Pharmacy Protocol   PRN Benjie Rodriguez M.D.       • fentaNYL (SUBLIMAZE) injection 25-50 mcg  25-50 mcg Q2HRS PRN Benjie Rodriguez M.D.   50 mcg at 17 2337   • guaiFENesin (ROBITUSSIN) 100 MG/5ML solution 200 mg  10 mL Q6HRS Ananda Jo M.D.   200 mg at 08/15/17 0900   • acetaminophen (TYLENOL) tablet 650 mg  650 mg Q6HRS PRN Ananda Jo M.D.   650 mg at 08/15/17 1135   • norepinephrine (LEVOPHED) 8 mg in  mL Infusion  0-30 mcg/min Continuous Ananda Jo M.D. 5.6 mL/hr at 08/15/17 1226 3 mcg/min at 08/15/17 1226   • lactated ringers infusion   Continuous Ananda Jo M.D. 100 mL/hr at 08/15/17 0909     • levalbuterol (XOPENEX) 1.25 MG/3ML nebulizer solution 1.25 mg  1.25 mg Q2HRS PRN (RT) Ananda Jo M.D.       • phosphorus (K-PHOS-NEUTRAL, PHOSPHA 250 NEUTRAL) per tablet 1 Tab  1 Tab Q6HRS Ananda Jo M.D.   1 Tab at 08/15/17 1135   • oxycodone immediate release (ROXICODONE) tablet 10 mg  10 mg Q6HRS Ananda Jo M.D.   10 mg at 08/15/17 1135   • levalbuterol (XOPENEX) 1.25 MG/3ML nebulizer solution 1.25 mg  1.25 mg 4X/DAY (RT) Ananda Jo M.D.   1.25 mg at 08/15/17 1153   • desmopressin (DDAVP) injection 2 mcg  2 mcg Q12HRS Jessika STANLEY  ABEL Carlson   2 mcg at 08/15/17 0900   • enoxaparin (LOVENOX) inj 40 mg  40 mg DAILY Ananda Jo M.D.   40 mg at 08/15/17 0901   • Respiratory Care per Protocol   Continuous RT Stacy Gallegos M.D.       • ampicillin/sulbactam (UNASYN) 3 g in  mL IVPB  3 g Q6HRS Ananda Jo M.D.   Stopped at 08/15/17 1204   • ipratropium-albuterol (DUONEB) nebulizer solution 3 mL  3 mL Q4H PRN (RT) Ananda Jo M.D.       • hydrocodone/acetaminophen (NORCO)  MG per tablet 1 Tab  1 Tab Q3HRS PRN Stacy Gallegos M.D.   1 Tab at 08/15/17 1311   • nicotine (NICODERM) 21 MG/24HR 21 mg  21 mg Daily-0600 Stacy Gallegos M.D.   21 mg at 08/15/17 0520   • ondansetron (ZOFRAN) syringe/vial injection 4 mg  4 mg Q4HRS PRN Stacy Gallegos M.D.       • ondansetron (ZOFRAN ODT) dispertab 4 mg  4 mg Q4HRS PRN Stacy Gallegos M.D.   4 mg at 08/07/17 2014   • promethazine (PHENERGAN) tablet 12.5-25 mg  12.5-25 mg Q4HRS PRN Stacy Gallegos M.D.       • promethazine (PHENERGAN) suppository 12.5-25 mg  12.5-25 mg Q4HRS PRN Stacy Gallegos M.D.       • prochlorperazine (COMPAZINE) injection 5-10 mg  5-10 mg Q4HRS PRN Stacy Gallegos M.D.         Last reviewed on 8/7/2017  6:55 PM by Nnamdi Wilson    Quality  Measures:  Core Measures & Quality Metrics    Assessment and Plan:    Acute unspecified encephalopathy              - toxic metabolic encephalopathy due to alcohol withdrawal delirium              - ? Paraneoplastic, ? Mets ? ICU delirium    Alcohol withdrawal delirium              - phenobarbital completed              - rally vits  Aspiration pneumonia              - Unasyn              - mucolytic  Acute hypoxia and aspiration today   - intubate; full support for short course   - During intubation and subsequent bronchoscopy, evidence of significant aspiration with complete filling of the right lower lobe bronchus with aspirated thick secretions  Left hilar mass highly suspicious for  bronchogenic carcinoma with evidence of liver and bony metastases              - bronch and endobronchial biopsies from left upper lobe obtained today  Hypercalcemia due to bony metastases                      - improved with hydration, calcitonin                          - stopped calcitonin                - pamidronate  Acute kidney injury - improved with hydration  Hypernatremia - consistent with central diabetes insipidus              - resolved              - decrease free water              - follow Na closely              - DDAVP              - MRI of brain   Back pain due to osseous metastases              - cont analgesia  Systemic arterial hypertension              - cont BP control  Dyslipidemia  Tobacco abuse  Alcohol abuse              - vitamins                   Discussed patient condition and risk of morbidity and/or mortality with Family, RN, RT and QA team.    The patient remains critically ill.  Critical care time = 48 minutes in directly providing and coordinating critical care and extensive data review.  No time overlap and excludes procedures.

## 2017-08-15 NOTE — PROGRESS NOTES
[facility] -  PROGRESS NOTE               Author: Meenu Gilbert M.D. Date & Time: 8/15/2017  10:40 AM     HPI:  This is a 61-year-old  male with a past medical history significant for hypertension, hyperlipidemia, alcohol abuse who was initially admitted with severe back pain and was found to have a left upper lung mass with suspected bony and liver metastases and now noted to have acute hypernatremia and mental status changes within the past 24 hours.  The patient at this time is confused and lethargic and is unable to give me any history.  The majority of history was obtained through review of the medical records and discussion with the other physicians on the case.  The patient was initially admitted with complaints of severe back pain and he had imaging done as an outpatient that revealed liver and bony mets of unknown primary.  He subsequently underwent a CT scan of the chest that showed a left upper lobe lung mass and so he was admitted for further diagnostic evaluation as well as for pain control.  When patient was initially admitted, he was noted to have an elevated creatinine of 2.39.  It is unclear what his baseline creatinine is and it is unclear if the CT scan done as an outpatient involved IV contrast.  He was hydrated with normal saline with improvement in his renal function.  However, his sodium level started to increase, it was 132 on admission, it was 141 yesterday and this morning's labs revealed a sodium level of 157 and acute change within the past 24 hours and repeat labs later in the morning revealed a sodium level of 163, the patient's normal saline was discontinued.  He was started on D5W.  It should be noted that according to the primary service the patient's mental status was fairly stable on admission.  He was alert and oriented; however, starting yesterday, patient was noted to have increased tremulousness and some mental status changes that initially  were attributed to possible alcohol withdrawal given his heavy alcohol use; however, his symptoms have worsened today.  He is more lethargic and so he has been transferred to the intensive care unit for further evaluation.  It is unclear what his urine output is, he has been incontinent and Larios catheter is being placed.  His urine studies revealed a urine sodium of less than 10 and a urine osmolality of 199 and a serum osmolality of 349.  As stated above, his creatinine has improved throughout his hospital stay, although it is slightly higher this afternoon at 1.59.      DAILY NEPHROLOGY SUMMARY:  8/11/17--Remains confused, evidence of EtOH withdrawal, urine studies consistent with central DI as urine osm did increase after DDAVP, after larios placed pt having large urinary output, Ca improved, BP stable  8/12/17 - Sodium normal, agree on decreasing fluid, D/C calcitonin   8/14/17 - NAEO per RN staff, overall unchanged mental status  8/15/17 - NAEO, no issues per RN staff, no major changes overall      Review of Systems   Unable to perform ROS: patient nonverbal         Physical Exam   Constitutional: He appears well-developed and well-nourished.   HENT:   Head: Normocephalic and atraumatic.   Eyes: Conjunctivae are normal. No scleral icterus.   Neck: Neck supple. No tracheal deviation present.   Cardiovascular: Normal rate and regular rhythm.    Pulmonary/Chest: Effort normal and breath sounds normal.   Abdominal: Soft. Bowel sounds are normal.   Musculoskeletal: He exhibits no edema or tenderness.   Neurological: He is alert. He displays no atrophy. He exhibits normal muscle tone.   Skin: Skin is warm and dry.   Psychiatric: He is agitated. He is noncommunicative.   Nursing note and vitals reviewed.        PAST FAMILY HISTORY: Reviewed and Unchanged  SOCIAL HISTORY: Reviewed and Unchanged  CURRENT MEDICATIONS: Reviewed  LABORATORY RESULTS: Reviewed  IMAGING STUDIES: Reviewed      Fluids:    Date 08/15/17 0700 -  08/16/17 0659   Shift [id number] 9438-3664 4090-0659 24 Hour Total   I  N  T  A  K  E   I.V. 200   200      IV Volume (LR) 200   200    Enteral 160   160      Enteral Volume 130   130      Free Water / Tube Flush 30   30    Shift Total 360   360   O  U  T  P  U  T   Urine 280   280      Indwelling Cathether 280   280    Shift Total 280   280   NET 80   80         IMPRESSION:  - JOSE L    * Etiology likely 2/2 pre renal  - Hypernatremia    * Etiology 2/2 central DI  - Hypercalcemia  - AMS    * Likely combination of metabolic derangements and EtOH use  - Left lung mass  - HTN    ASSESSMENT:  - GFR: BCr unknown, likely at baseline now  - Urine: non-oliguric  - BP: Goal < 140/90  - Volume: euvolemic  - Acid/Base: no sig. acid base disturbance  - Electrolytes: stable  - Anemia: Goal Hgb 10-11  - MBD: Ca normal, PO4 normal  - HD Access: None    PLAN:  - No compelling indication for RRT at this time  - Continue ddAVP 2 mcg BID  - Monitor I's and O's  - Daily labs  - [facility] protocol  - Pamidronate given 8/10/17  - Agree with DNR/DNI, possibly even comfort care given lack of improvement and overall poor prognosis

## 2017-08-15 NOTE — CARE PLAN
Problem: Safety  Goal: Will remain free from injury  Will continue to monitor for need for restraints, pt's room is close to nursing station and is in constant sight of nursing staff    Problem: Psychosocial Needs:  Goal: Level of anxiety will decrease  Plan to start pt on seroquel today for apparent ICU delirium, will continue to assess pt for other phychosocial needs

## 2017-08-15 NOTE — FLOWSHEET NOTE
08/15/17 0735   Events/Summary/Plan   Events/Summary/Plan SVN done by Genevieve.  Pt. awake and confused.   Interdisciplinary Plan of Care-Outcomes    Bronchodilator Outcome Diminished Wheezing and Volume of Air Movement Increased;Improved Vital Signs and Measures of Gas Exchange   Education   Education Yes - Pt. / Family has been Instructed in use of Respiratory Equipment;Yes - Pt. / Family has been Instructed in use of Respiratory Medications and Adverse Reactions   RT Assessment of Delivered Medications   Evaluation of Medication Delivery Daily Yes-- Pt /Family has been Instructed in use of Respiratory Medications and Adverse Reactions   SVN Group   #SVN Performed Yes   Given By: Genevieve   Date SVN Last Changed 08/10/17   Date SVN Next Change Due (Q 7 Days) 08/17/17   Chest Exam   Work Of Breathing / Effort Moderate   Respiration 20   Pulse (!) 104   Heart Rate (Monitored) (!) 116   Breath Sounds   Pre/Post Intervention Pre Intervention Assessment   RUL Breath Sounds Diminished   RML Breath Sounds Diminished   RLL Breath Sounds Diminished   CAROLYN Breath Sounds Diminished   LLL Breath Sounds Diminished   Secretions   Cough Congested;Non Productive   How Sputum Obtained Cough on Request;Spontaneous   Sputum Amount Unable to Evaluate   Sputum Color Unable to Evaluate   Sputum Consistency Unable to Evaluate   Oximetry   Continuous Oximetry Yes   O2 Alarms Set & Reviewed Yes   Oxygen   Pulse Oximetry 96 %   O2 (LPM) 4   O2 Daily Delivery Respiratory  OxyMask

## 2017-08-15 NOTE — FLOWSHEET NOTE
08/15/17 1150   Events/Summary/Plan   Events/Summary/Plan SVN done via mask blowby due to not tolerating  strap around head.  Pt. tolerated fair.  Pt. confused.   Interdisciplinary Plan of Care-Outcomes    Bronchodilator Outcome Diminished Wheezing and Volume of Air Movement Increased   Education   Education Yes - Pt. / Family has been Instructed in use of Respiratory Equipment;Yes - Pt. / Family has been Instructed in use of Respiratory Medications and Adverse Reactions   RT Assessment of Delivered Medications   Evaluation of Medication Delivery Daily Yes-- Pt /Family has been Instructed in use of Respiratory Medications and Adverse Reactions   SVN Group   #SVN Performed Yes   Given By: Blowby;Mask   Date SVN Last Changed 08/10/17   Date SVN Next Change Due (Q 7 Days) 08/17/17   Respiratory WDL   Respiratory (WDL) X   Chest Exam   Work Of Breathing / Effort Mild;Moderate   Respiration (!) 30   Pulse (!) 127   Heart Rate (Monitored) (!) 125   Breath Sounds   Pre/Post Intervention Pre Intervention Assessment   RUL Breath Sounds Crackles;Diminished   RML Breath Sounds Diminished   RLL Breath Sounds Diminished   CAROLYN Breath Sounds Crackles;Diminished   LLL Breath Sounds Diminished   Secretions   Cough Congested;Non Productive;Swallowed   How Sputum Obtained Spontaneous   Sputum Amount Unable to Evaluate   Sputum Color Unable to Evaluate   Sputum Consistency Unable to Evaluate   Oximetry   Continuous Oximetry Yes   Oxygen   Pulse Oximetry 94 %   O2 (LPM) 2   O2 Daily Delivery Respiratory  OxyMask

## 2017-08-15 NOTE — CARE PLAN
Problem: Safety  Goal: Will remain free from falls  Intervention: Assess risk factors for falls    08/14/17 2000   OTHER   Fall Risk High Risk to Fall - 2 or more points    Risk for Injury-Any positive answers results in the pt being at high risk for fall related injury Alcohol Abuse   Mobility Status Assessment 2-2 Healthcare Providers Required for Assistance with Ambulation & Transfer   History of fall 0   Pt Calls for Assistance No       Intervention: Implement fall precautions    08/11/17 2000 08/14/17 2000   OTHER   Environmental Precautions --  Treaded Slipper Socks on Patient;Personal Belongings, Wastebasket, Call Bell etc. in Easy Reach;Report Given to Other Health Care Providers Regarding Fall Risk;Bed in Low Position;Communication Sign for Patients & Families;Mobility Assessed & Appropriate Sign Placed   IV Pole on Same Side of Bed as Bathroom (n/a) --    Bedrails --  Alternative to Bedrails Used   Chair/Bed Strip Alarm --  Yes - Alarm On   Bed Alarm (Built in - for ICU ONLY) --  Yes - Alarm On           Problem: Skin Integrity  Goal: Risk for impaired skin integrity will decrease  Intervention: Implement precautions to protect skin integrity in collaboration with the interdisciplinary team    08/14/17 2000   OTHER   Skin Preventative Measures Pillows in Use for Support / Positioning;Heel Float Boots in Use    Bed Types Low Air Loss Mattress   Friction Interventions Draw Sheet / Pad Used for Repositioning   Moisturizers Moisturizer ;Barrier Wipes   Activity  Bed   Patient Turns / Repositioning Right Side   Assistance / Tolerance for Turning/Repositioning Assistance of Two or More   Patient is Receiving Nutrition Tube Feeding Nutrition   Nutrition Consult Ordered No, Consult has Already been Placed   Vitamin Therapy in Use No

## 2017-08-15 NOTE — PROCEDURES
Date: 8/15/2017    Procedure:  Diagnostic and therapeutic bronchoscopy with Endobronchial Bx    Indication: Respiratory failure, lung cancer, aspiration pneumonia     Physician:  Dr. Benjie Rodriguez MD    Consent:  Obtained from wife at bedside.    Procedure:  This patient has developed increasing respiratory failure and required emergent intubation.  Bronchoscopy was indicated for therapeutic airway lavage, BAL to evaluate for pneumonia and endobronchial bx's to eval for lung cancer.  A flexible FOB was inserted through the ETT without difficulty.  All airways were evaluated to the sub-segmental level.  The airway mucosa was inflamed.  There were copious thick, aspirated upper airway secretions completely obstructing the RLL.  These were lavaged and suctioned clear from the RLL.  The CAROLYN endobronchial mucosa was irregular and heaped up suspicious for carcinoma.  Endobronchial bx's x 5 were obtained from this area. The bronchoscope was then wedged in a segment of the LL bronchus.  30cc of saline was instilled with moderate return of purulent BAL fluid.  The BAL specimen will be sent for appropriate culture.  No immediate complications.  EBL = 0.

## 2017-08-16 NOTE — RESPIRATORY CARE
Ventilator Weaning Update    Patient is on vent day 2 SBT was tolerated for a minimum of 1 hours on settings of 5/8.    Wean parameters for this SBT were:  #FVC / Vital Capacity (liters) : 601 (08/16/17 0757)  NIF (cm H2O) : -22 (08/16/17 0757)  Rapid Shallow Breathing Index (RR/VT): 44 (08/16/17 0757)  RR (bpm): 17 (08/16/17 0757)  Spontaneous VE: 13.5 (08/16/17 0757)  Spontaneous VT: 495 (08/16/17 0757)    Barriers to Wean: Other (Comments) (recent intubation)  Weaning Trial Stopped due to:: Pt weaned for 1 hour and returned to rest settings per protocol

## 2017-08-16 NOTE — PROGRESS NOTES
Around 1500, pt's overall medical status began to decline, pt's neuro status began to decline, he was not following commands or answering questions as well as he had been during the beginning of the shift, he began to struggle with his secretions and seemingly could not protect his airway, we could not orally or nasally suction the pt at this time due to pt being uncooperative, during a few moments pt began to carter down with a rate as low as the 60s however his rate would quickly improve with coaching to cough, both wife and Dr. Rodriguez were at bedside and the decision was made to intubate/bronch/sedate the pt to make him more comfortable.

## 2017-08-16 NOTE — CARE PLAN
Problem: Oxygenation:  Goal: Maintain adequate oxygenation dependent on patient condition  On vent    Problem: Bronchoconstriction:  Goal: Improve in air movement and diminished wheezing  DUO Q4    Problem: Bronchopulmonary Hygiene:  Goal: Increase mobilization of retained secretions  Suctioning as indicated through ET tube    Problem: Ventilation Defect:  Goal: Ability to achieve and maintain unassisted ventilation or tolerate decreased levels of ventilator support  Adult Ventilation Update    Total Vent Days: 2  18/470/8/30%              8.5@25  Patient Lines/Drains/Airways Status    Active Airway      Name: Placement date: Placement time: Site: Days:     Airway Group ET Tube 8.5 08/15/17  1607                      Plateau Pressure (Q Shift): 20 (08/15/17 1837)  Static Compliance (ml / cm H2O): 117 (08/15/17 2216)    Patient failed trials because of Barriers to Wean: Other (Comments) (recent intubation) (08/15/17 1837)    Sputum/Suction   Cough: Moist;Productive (08/16/17 0400)  Sputum Amount: Moderate (08/16/17 0400)  Sputum Color: Tan;White (08/16/17 0400)  Sputum Consistency: Thick (08/16/17 0400)    Mobility Group  Activity Performed: Unable to mobilize (08/15/17 2000)  Time Activity Tolerated: 30 min (08/08/17 0800)  Distance Per Occurrence (ft.): 200 feet (08/08/17 0800)  # of Times Distance was Traveled: 2 (08/08/17 0800)  Assistance / Tolerance: Assistance of Two or More (08/10/17 2000)  Pt Calls for Assistance: No (08/15/17 2000)  Staff Present for Mobilization: RN;CNA;Other (comment) (security) (08/08/17 0800)  Gait: Unable to Ambulate (08/10/17 1800)  Assistive Devices: Hand held assist (08/08/17 0800)  Reason Not Mobilized: Unstable condition (08/15/17 2000)  Mobilization Comments: RASS +3 with stimulation (08/12/17 2000)    Events/Summary/Plan: titrated FIO2 post ABG down to 30% (08/16/17 0514)

## 2017-08-16 NOTE — PROGRESS NOTES
[facility]  ICU Note     ----------------------------------------------------------------------------------  Date & Time:   8/16/2017   11:09 AM     ----------------------------------------------------------------------------------  PATIENT ID  Name:             Victor Manuel Glasgow   YOB: 1955  Age:                 61 y.o.  male   MRN:               [id number]  ----------------------------------------------------------------------------------  Senior Resident (R2/R3):  Dr. Alvarado  Attending/Team:  Dr. Holly  ----------------------------------------------------------------------------------  ----------------------------------------------------------------------------------  REASON FOR ADMISSION/ICU STAY:  24-hour events: Developed respiratory distress and ultimately intubated, MRI and biopsy was performed, stable on vent, sedated on propofol, pressor support    Lines: IJ, cortrac, rectal tube, larios     MEDICATIONS:   Antibiotics: Unasyn day#6   Current Facility-Administered Medications   Medication Dose Frequency Provider Last Rate Last Dose   • potassium phosphates 30 mmol in D5W 500 mL ivpb  30 mmol Once Benjie Rodriguez M.D. 83.3 mL/hr at 08/16/17 0817 30 mmol at 08/16/17 0817   • oxycodone immediate release (ROXICODONE) tablet 10 mg  10 mg Q6HRS Benjie Rodriguez M.D.   10 mg at 08/16/17 1108   • quetiapine (SEROQUEL) tablet 50 mg  50 mg TID Benjie Rodriguez M.D.   50 mg at 08/16/17 0903   • MD ALERT...Adult ICU Electrolyte Replacement per Pharmacy Protocol   PRN Benjie Rodriguez M.D.       • propofol (DIPRIVAN) injection  0-80 mcg/kg/min Continuous Benjie Rodriguez M.D. 13.7 mL/hr at 08/16/17 0800 30 mcg/kg/min at 08/16/17 0800   • senna-docusate (PERICOLACE or SENOKOT S) 8.6-50 MG per tablet 2 Tab  2 Tab BID Benjie Rodriguez M.D.   2 Tab at 08/16/17 0903    And   • polyethylene glycol/lytes (MIRALAX) PACKET 1 Packet  1 Packet QDAY PRN Benjie Rodriguez M.D.        And   • magnesium  hydroxide (MILK OF MAGNESIA) suspension 30 mL  30 mL QDAY PRN Benjie Rodriguez M.D.        And   • bisacodyl (DULCOLAX) suppository 10 mg  10 mg QDAY PRN Benjie Rodriguez M.D.       • chlorhexidine (PERIDEX) 0.12 % solution 15 mL  15 mL BID Benjie Rodriguez M.D.   15 mL at 08/16/17 0903   • lidocaine (XYLOCAINE) 1%  injection  1-2 mL Q30 MIN PRN Benjie Rodriguez M.D.       • fentaNYL (SUBLIMAZE) injection 25 mcg  25 mcg Q HOUR PRN Benjie Rodriguez M.D.        Or   • fentaNYL (SUBLIMAZE) injection 50 mcg  50 mcg Q HOUR PRN Benjie Rodriguez M.D.        Or   • fentaNYL (SUBLIMAZE) injection 100 mcg  100 mcg Q HOUR PRN Benjie Rodriguez M.D.   100 mcg at 08/16/17 0520   • ipratropium-albuterol (DUONEB) nebulizer solution 3 mL  3 mL Q2HRS PRN (RT) Benjie Rodriguez M.D.       • ipratropium-albuterol (DUONEB) nebulizer solution 3 mL  3 mL Q4HRS (RT) Benjie Rodriguez M.D.   3 mL at 08/16/17 0620   • famotidine (PEPCID) tablet 20 mg  20 mg Q12HRS Benjie Rodriguez M.D.   20 mg at 08/16/17 0903    Or   • famotidine (PEPCID) injection 20 mg  20 mg Q12HRS Benjie Rodriguez M.D.       • guaiFENesin (ROBITUSSIN) 100 MG/5ML solution 200 mg  10 mL Q6HRS Ananda Jo M.D.   200 mg at 08/16/17 0903   • acetaminophen (TYLENOL) tablet 650 mg  650 mg Q6HRS PRN Ananda Jo M.D.   650 mg at 08/16/17 0114   • norepinephrine (LEVOPHED) 8 mg in  mL Infusion  0-30 mcg/min Continuous Ananda Jo M.D. 18.8 mL/hr at 08/16/17 0153 10 mcg/min at 08/16/17 0153   • lactated ringers infusion   Continuous Ananda Jo M.D.   Stopped at 08/16/17 1000   • phosphorus (K-PHOS-NEUTRAL, PHOSPHA 250 NEUTRAL) per tablet 1 Tab  1 Tab Q6HRS Ananda Jo M.D.   1 Tab at 08/16/17 1108   • desmopressin (DDAVP) injection 2 mcg  2 mcg Q12HRS Jessika Carlson M.D.   2 mcg at 08/16/17 0900   • enoxaparin (LOVENOX) inj 40 mg  40 mg DAILY Ananda Jo M.D.   40 mg at 08/16/17 0903   • Respiratory Care per Protocol    Continuous RT Stacy Gallegos M.D.       • ampicillin/sulbactam (UNASYN) 3 g in  mL IVPB  3 g Q6HRS Ananda Jo M.D. 200 mL/hr at 08/16/17 1108 3 g at 08/16/17 1108   • nicotine (NICODERM) 21 MG/24HR 21 mg  21 mg Daily-0600 Stacy Gallegos M.D.   21 mg at 08/16/17 0520   • ondansetron (ZOFRAN) syringe/vial injection 4 mg  4 mg Q4HRS PRN Stacy Gallegos M.D.       • ondansetron (ZOFRAN ODT) dispertab 4 mg  4 mg Q4HRS PRN Stacy Gallegos M.D.   4 mg at 08/07/17 2014   • promethazine (PHENERGAN) tablet 12.5-25 mg  12.5-25 mg Q4HRS PRN Stacy Gallegos M.D.       • promethazine (PHENERGAN) suppository 12.5-25 mg  12.5-25 mg Q4HRS PRN Stacy Gallegos M.D.       • prochlorperazine (COMPAZINE) injection 5-10 mg  5-10 mg Q4HRS PRN Stacy Gallegos M.D.         Last reviewed on 8/7/2017  6:55 PM by Ev Beyer MILAN    Allergies:   No Known Allergies    Vitals:   Filed Vitals:    08/16/17 0700 08/16/17 0800 08/16/17 0900 08/16/17 1000   BP:       Pulse: 93 97 101 104   Temp:  38 °C (100.4 °F)  37.8 °C (100 °F)   Resp:  23 21 18   Height:       Weight:       SpO2: 97% 96% 98% 96%     Weight/BMI: Body mass index is 22.75 kg/(m^2).  Pulse Oximetry: 96 %, O2 (LPM): 4, FIO2%: 40, O2 Delivery: Ventilator    --------------------------------------------------------------------------------  PE:   General: Vented, sedated on propofol  HEENT: NC/AT. MMM, PERRL  Cardiovascular: RRR with no M/R/G.  Respiratory: Symmetrical chest. CTAB consistent with VDRF  Abdomen: soft, NT/ND, no masses, +BS    EXT:  No C/C/E 2+ pulses    Neuro: sedated    ---------------------------------------------------------------------------------  ASSESSMENT/PLAN:  Victor Manuel Glasgow is a 61 y.o. male admitted with metastatic cancer of unknown primary, likely lung, then with worsening altered mental status, hypercalcemia, and severe hypernatremia, all resolved aside from altered mental status.     Neuro    # Altered mental status,  - Now  intubated and sedated on propofol  - Agitated when sedation weaned  - altered mentation, likely due to brain mets  - Family desires comfort care after brother gets to town              - Sedation with propofol     Cardiac    # sinus tachycardia  - Etiology unclear, 110's-120's with occasional PVC's  - No other signs of sepsis  - On unasyn (for aspiration pna)  - Stable on 2L O2, on lovenox prophylaxis so PE is less likely              - Continue to monitor      Respiratory/Infectious Disease      # Aspiration pneumonia  - WBC trending down  - UA wnl, blood cultures NGTD  - CXR with perihilar infiltrates  - Most likely aspiration pna  - Continues to be febrile              - continue unasyn started 8/10 by pulm day 6/7    # Tobacco user              - nicotine patch              - RT protocol      GI/Nutrition    # Poor nutrition  - NG tube placed and tube feeds started, at goal   - Free water 300mL every 4 hours.  - Rally bag IV once and thiamine, folate, multivitamin daily        Renal/Electrolytes    # Hypernatremia (resolved)  - acute onset, from 132 on admit to 163, now WNL    - uncertain if he is symptomatic since altered mental status was present prior to this and now improving  - urine lytes show very dilute urine, possibly diabetes insipidus    - urine more concentrated after trial of DDAVP  - nephrology consult placed, appreciate recs              - continue DDAVP 2mcg BID              - Daily BMP              - Consider further work up if labs worsen    # Hypercalcemia (Resolved)  - Ca at 13.9 on outpatient labs, up to 14.0 on 8/10, now WNL  - Possible cause of pain/AMS?  - nephrology consult placed as above              - s/p pamidronate 8/10, takes 2 days to peak effectiveness              - continue to hydrate free water through NGT    # Renal Insufficiency, (resolved)  - admit creatinine 2.6, today improved to 1.12  - unclear chronic or acute. Baseline unknown, but PCP notes do not mention chronic  kidney disease              - monitor with sodium as above      Heme/Onc    #Metastatic disease  - CT Scan 8/05/2017 with mass in left upper lobe, likely primary lung cancer. Mets were noted in liver, bone, and maybe adrenal glands. New diagnosis.  - alk phos and calcium elevated due to bony involvement  - 30lb weight loss and poor appetite  - head CT negative for intracranial mets but MRI with numerous mets  - Tissue results pending  - Ddx: metastatic lung cancer, prostate cancer, or melanoma. Has not had screening tests.              - Await biopsy results, but no plan for aggressive treatment    FEN/Prophy/Core    - IVF: None  - Electrolytes: hypercalcemia and hypernatremia  - Diet: NPO, tube feeds with free water  - bowel regimen: continue lactulose  - DVT prophy: heparin subQ  - GI prophy: not indicated  - Lines: multiple PIV  - Tubes: larios placed 8/10 and NG tube placed 8/11  - Code: DNR    Dispo: Inpatient, likely comfort care tomorrow when all family is present    --------------------------------------------------------------------------------

## 2017-08-16 NOTE — DIETARY
NUTRITION - Order received for Metabolic Cart Study per Dietitian/RT. Study not indicated at this time. Patient is now intubated and TF continues with Diabetisource AC @ 65 ml/hr providing 1872 kcals, 94 grams protein and 1279 mL free water.  Propofol is now running @ 13.7 ml/hr and has been as high as 15.9 ml/hr this morning (362 - 420 kcal/day).  RN suspects that propofol likely to remain in this range.  RD will modify TF recommendations while propofol is running.     Estimated Needs: (WTL=6694); PSU (L/min: 10.5; Tmax: 39.3) = 2153 kcal/day  Total Calories / day: 5419-8220 (Calories / k-30)  Total Grams Protein / day: 78- 92  (Grams Protein / k.1 - 1.3)  Total Fluids ml / day: 1765 ml    Plan/Reccomend:  1) While propofol running reduce TF to Diabetisource AC @ 55 ml/hr which will provide: 1584 kcal (+kcal from propofol), 79 grams of protein and 1082 ml free water.  2) Once propofol discontinued increase to ultimate goal with diabetisource AC @ 65 ml/hr.  3) Fluids per MD  4) Dietitian/RT to order as needed.     RD will continue to monitor

## 2017-08-16 NOTE — PROGRESS NOTES
Pulmonary Critical Care Progress Note        DOS:  8/16/2017    Chief Complaint: ALOC, bone mets    History of Present Illness: 61 y.o. M, h/o HTN, DLD, EtOH use, had MRI for back pain, found to have CAROLYN lung mass, ? CAROLYN carcinomatosis, bone and liver mets; admitted 8/7 with EtOH w/d, ALOC, JOSE L, hyponatremic    ROS:  Respiratory: unable to perform due to the patient's inability to effectively communicate, Cardiac: unable to perform due to the patient's inability to effectively communicate, GI: unable to perform due to the patient's inability to effectively communicate.  All other systems negative.    Interval Events:  24 hour interval history reviewed    - sedated; on propofol   - emergently intubated 8/15, aspirating   - NE 10 mcg/min   - jack TF at goal   - Tm 101.5   - CXR   - vent day 2, not ready for ext this am   - replace K and phos   - DDAVP, Na up to 148   - Unasyn day 6   -    - MRI - diffuse mets   - add scheduled oxy, fentanyl gtt    PFSH:  No change.    Respiratory:  Morel Vent Mode: APVCMV, Rate (breaths/min): 18, Vt Target (mL): 470, PEEP/CPAP: 8, FiO2: 30, Static Compliance (ml / cm H2O): 93.2, Weaning Trial Stopped due to:: Pt weaned for 1 hour and returned to rest settings per protocol, Length of Weaning Trial (Hours): 1, Control VTE (exp VT): 637  Pulse Oximetry: 97 %  Chest Tube Drains:          Exam: rhonchi throughout all lung fields and diminished breath sounds moderate  ImagingAvailable data reviewed   Recent Labs      08/15/17   1812  08/16/17   0449   ISTATAPH  7.458  7.492   ISTATAPCO2  43.0*  37.9*   ISTATAPO2  205*  109*   ISTATATCO2  32  30   [id number]  100*  99   ISTATARTHCO3  30.5*  29.1*   ISTATARTBE  6*  5*   ISTATTEMP  100.1 F  99.8 F   ISTATFIO2  80  40   ISTATSPEC  Arterial  Arterial   ISTATAPHTC  7.446  7.482   [id number]  209*  113*       HemoDynamics:  Pulse: 98, Heart Rate (Monitored): (!) 102  NIBP: 120/74 mmHg       Exam: sinus tachycardia  Imaging: Available  data reviewed        Neuro:  GCS Total Jorge Coma Score: 9       Exam: Heavily sedated  Imaging: Available data reviewed    Fluids:  Intake/Output       08/14/17 0700 - 08/15/17 0659 08/15/17 0700 - 08/16/17 0659 08/16/17 0700 - 08/17/17 0659      8676-7951 4830-1651 Total 9203-6856 0010-5891 Total 7361-2516 0904-5579 Total       Intake    I.V.  1344.9  1271.5 2616.4  1288.9  1552.6 2841.5  --  -- --    Propofol Volume -- -- -- 16.5 137.5 154 -- -- --    Norepinephrine Volume 144.9 71.5 216.4 72.4 215.2 287.5 -- -- --    IV Volume (LR) 1000 1200 2200 1200 1200 2400 -- -- --    IV Piggyback Volume 200 -- 200 -- -- -- -- -- --    Other  60  150 210  60  90 150  --  -- --    Medications (P.O./ Enteral Liquids) 60 150 210 60 90 150 -- -- --    Enteral  1240  1300 2540  1210  1300 2510  --  -- --    Enteral Volume   -- -- --    Free Water / Tube Flush 460 520 980 430 520 950 -- -- --    Total Intake 2644.9 2721.5 5366.4 2558.9 2942.6 5501.5 -- -- --       Output    Urine  1500  985 2485  1035  945 1980  --  -- --    Indwelling Cathether 6298 487 1938 7542 364 9854 -- -- --    Drains  500  550 1050  500  200 700  --  -- --    Rectal Tube  500 200 700 -- -- --    Total Output 2000 1535 3535 1535 1145 2680 -- -- --       Net I/O     644.9 1186.5 1831.4 1023.9 1797.6 2821.5 -- -- --        Weight: 76.1 kg (167 lb 12.3 oz)  Recent Labs      08/14/17   1025   08/15/17   0445  08/15/17   1643  08/16/17   0410   SODIUM  141   < >  143  145  148*   POTASSIUM  3.6   < >  3.1*  3.2*  3.1*   CHLORIDE  106   < >  110  110  112   CO2  28   < >  27  28  27   BUN  24*   < >  21  20  18   CREATININE  1.06   < >  0.98  1.02  0.89   MAGNESIUM  1.5   --   1.5   --   2.1   PHOSPHORUS  2.8   --   2.9   --   2.3*   CALCIUM  7.9*   < >  7.7*  7.5*  7.1*    < > = values in this interval not displayed.       GI/Nutrition:  Exam: abdomen is soft and non-tender  Imaging: Available data reviewed  NPO and tube  feed Tolerated  Liver Function  Recent Labs      17   2349  17   1025   08/15/17   0445  08/15/17   1643  17   0410   ALTSGPT   --   16   --   14   --   15   ASTSGOT   --   29   --   19   --   16   ALKPHOSPHAT   --   502*   --   373*   --   305*   TBILIRUBIN   --   0.3   --   0.3   --   0.2   PREALBUMIN  <3.0*   --    --    --    --    --    GLUCOSE   --   99   < >  120*  138*  135*    < > = values in this interval not displayed.       Heme:  Recent Labs      17   1340  08/15/17   0445  17   0410   RBC  3.06*  2.83*  2.65*   HEMOGLOBIN  9.9*  9.2*  8.5*   HEMATOCRIT  29.4*  27.6*  26.2*   PLATELETCT  101*  100*  132*       Infectious Disease:  Temp  Av.3 °C (100.9 °F)  Min: 37.7 °C (99.8 °F)  Max: 38.5 °C (101.3 °F)  Monitored Temp  Av.1 °C (102.3 °F)  Min: 38.9 °C (102 °F)  Max: 39.3 °C (102.7 °F)  Micro: reviewed  Recent Labs      17   1025  17   1340  08/15/17   0445  17   0410   WBC   --   13.1*  11.0*  11.0*   NEUTSPOLYS   --    --    --   70.40   LYMPHOCYTES   --    --    --   15.70*   MONOCYTES   --    --    --   5.20   EOSINOPHILS   --    --    --   4.30   BASOPHILS   --    --    --   0.90   ASTSGOT     --     16   ALTSGPT  16   --     15   ALKPHOSPHAT  502*   --   373*  305*   TBILIRUBIN  0.3   --   0.3  0.2     Current Facility-Administered Medications   Medication Dose Frequency Provider Last Rate Last Dose   • potassium phosphates 30 mmol in D5W 500 mL ivpb  30 mmol Once Benjie Rodriguez M.D. 83.3 mL/hr at 17 30 mmol at 17   • quetiapine (SEROQUEL) tablet 50 mg  50 mg TID Benjie Rodriguez M.D.   50 mg at 08/15/17 2012   • MD ALERT...Adult ICU Electrolyte Replacement per Pharmacy Protocol   PRN Benjie Rodriguez M.D.       • propofol (DIPRIVAN) injection  0-80 mcg/kg/min Continuous Benjie Rodriguez M.D. 15.9 mL/hr at 17 35 mcg/kg/min at 17   • senna-docusate (PERICOLACE or SENOKOT S) 8.6-50 MG per tablet  2 Tab  2 Tab BID Benjie Rodriguez M.D.   Stopped at 08/15/17 2100    And   • polyethylene glycol/lytes (MIRALAX) PACKET 1 Packet  1 Packet QDAY PRN Benjie Rodriguez M.D.        And   • magnesium hydroxide (MILK OF MAGNESIA) suspension 30 mL  30 mL QDAY PRN Benjie Rodriguez M.D.        And   • bisacodyl (DULCOLAX) suppository 10 mg  10 mg QDAY PRN Benjie Rodriguez M.D.       • chlorhexidine (PERIDEX) 0.12 % solution 15 mL  15 mL BID Benjie Rodriguez M.D.   15 mL at 08/15/17 2012   • lidocaine (XYLOCAINE) 1%  injection  1-2 mL Q30 MIN PRN Benjie Rodriguez M.D.       • fentaNYL (SUBLIMAZE) injection 25 mcg  25 mcg Q HOUR PRN Benjie Rodriguez M.D.        Or   • fentaNYL (SUBLIMAZE) injection 50 mcg  50 mcg Q HOUR PRN Benjie Rodriguez M.D.        Or   • fentaNYL (SUBLIMAZE) injection 100 mcg  100 mcg Q HOUR PRN Benjie Rodriguez M.D.   100 mcg at 08/16/17 0520   • ipratropium-albuterol (DUONEB) nebulizer solution 3 mL  3 mL Q2HRS PRN (RT) eBnjie Rodriguez M.D.       • ipratropium-albuterol (DUONEB) nebulizer solution 3 mL  3 mL Q4HRS (RT) Benjie Rodriguez M.D.   3 mL at 08/16/17 0620   • famotidine (PEPCID) tablet 20 mg  20 mg Q12HRS Benjie Rodriguez M.D.   20 mg at 08/15/17 2012    Or   • famotidine (PEPCID) injection 20 mg  20 mg Q12HRS Benjie Rodriguez M.D.       • guaiFENesin (ROBITUSSIN) 100 MG/5ML solution 200 mg  10 mL Q6HRS Ananda Jo M.D.   200 mg at 08/16/17 0359   • acetaminophen (TYLENOL) tablet 650 mg  650 mg Q6HRS PRN Ananda Jo M.D.   650 mg at 08/16/17 0114   • norepinephrine (LEVOPHED) 8 mg in  mL Infusion  0-30 mcg/min Continuous Ananda Jo M.D. 18.8 mL/hr at 08/16/17 0153 10 mcg/min at 08/16/17 0153   • lactated ringers infusion   Continuous Ananda Jo M.D. 100 mL/hr at 08/16/17 0534     • phosphorus (K-PHOS-NEUTRAL, PHOSPHA 250 NEUTRAL) per tablet 1 Tab  1 Tab Q6HRS Ananda Jo M.D.   1 Tab at 08/16/17 0520   • desmopressin (DDAVP) injection 2 mcg  2  mcg Q12HRS Jessika Carlson M.D.   2 mcg at 08/15/17 2100   • enoxaparin (LOVENOX) inj 40 mg  40 mg DAILY Ananda Jo M.D.   40 mg at 08/15/17 0901   • Respiratory Care per Protocol   Continuous RT Stacy Gallegos M.D.       • ampicillin/sulbactam (UNASYN) 3 g in  mL IVPB  3 g Q6HRS Ananda Jo M.D.   Stopped at 08/16/17 0602   • nicotine (NICODERM) 21 MG/24HR 21 mg  21 mg Daily-0600 Stacy Gallegos M.D.   21 mg at 08/16/17 0520   • ondansetron (ZOFRAN) syringe/vial injection 4 mg  4 mg Q4HRS PRN Stacy Gallegos M.D.       • ondansetron (ZOFRAN ODT) dispertab 4 mg  4 mg Q4HRS PRN Stacy Gallegos M.D.   4 mg at 08/07/17 2014   • promethazine (PHENERGAN) tablet 12.5-25 mg  12.5-25 mg Q4HRS PRN Stacy Gallegos M.D.       • promethazine (PHENERGAN) suppository 12.5-25 mg  12.5-25 mg Q4HRS PRN Stacy Gallegos M.D.       • prochlorperazine (COMPAZINE) injection 5-10 mg  5-10 mg Q4HRS PRN Stacy Gallegos M.D.         Last reviewed on 8/7/2017  6:55 PM by Ev Beyer [facility]    Quality  Measures:  Radiology images reviewed, Medications reviewed, Labs reviewed and EKG reviewed                      Assessment and Plan:    Acute unspecified encephalopathy              - toxic metabolic encephalopathy               - MRI with diffuse metastatic dz  Alcohol withdrawal ?              - phenobarbital completed              - rally vits  Aspiration pneumonia              - Unasyn              - mucolytic  Acute hypoxic respiratory    - intubated 8/15   - full support for short course; VE adjusted   - During intubation and subsequent bronchoscopy, evidence of significant aspiration with complete filling of the right lower lobe bronchus with aspirated thick secretions  Left hilar mass highly suspicious for bronchogenic carcinoma with evidence of liver and bony metastases              - bronch and endobronchial biopsies from left upper lobe obtained 8/15 - await path  Hypercalcemia due to bony  metastases                      - improved with hydration, calcitonin                          - stopped calcitonin                - pamidronate  Acute kidney injury - improved with hydration  Hypernatremia - consistent with central diabetes insipidus              - resolved              - decrease free water              - follow Na closely              - DDAVP              - MRI noted   Back pain due to osseous metastases              - cont analgesia  Systemic arterial hypertension              - cont BP control  Dyslipidemia  Tobacco abuse  Alcohol abuse              - vitamins  I had a long d/w wife and two close friends today re: dx and care planning.  Given widely metastatic disease with mult brain mets, she states he would not want aggressive treatment, chemo or xrt.  She would like his brother to be able to say goodbye and he will arrive from [place] tomorrow.  For now we will continue with full vent support.  Discussed patient condition and risk of morbidity and/or mortality with Family, RN, RT and QA team.    The patient remains critically ill.  Critical care time = 45 minutes in directly providing and coordinating critical care and extensive data review.  No time overlap and excludes procedures.

## 2017-08-16 NOTE — PROGRESS NOTES
[facility] -  PROGRESS NOTE               Author: Meenu Gilbert M.D. Date & Time: 8/16/2017  11:56 AM     HPI:  This is a 61-year-old  male with a past medical history significant for hypertension, hyperlipidemia, alcohol abuse who was initially admitted with severe back pain and was found to have a left upper lung mass with suspected bony and liver metastases and now noted to have acute hypernatremia and mental status changes within the past 24 hours.  The patient at this time is confused and lethargic and is unable to give me any history.  The majority of history was obtained through review of the medical records and discussion with the other physicians on the case.  The patient was initially admitted with complaints of severe back pain and he had imaging done as an outpatient that revealed liver and bony mets of unknown primary.  He subsequently underwent a CT scan of the chest that showed a left upper lobe lung mass and so he was admitted for further diagnostic evaluation as well as for pain control.  When patient was initially admitted, he was noted to have an elevated creatinine of 2.39.  It is unclear what his baseline creatinine is and it is unclear if the CT scan done as an outpatient involved IV contrast.  He was hydrated with normal saline with improvement in his renal function.  However, his sodium level started to increase, it was 132 on admission, it was 141 yesterday and this morning's labs revealed a sodium level of 157 and acute change within the past 24 hours and repeat labs later in the morning revealed a sodium level of 163, the patient's normal saline was discontinued.  He was started on D5W.  It should be noted that according to the primary service the patient's mental status was fairly stable on admission.  He was alert and oriented; however, starting yesterday, patient was noted to have increased tremulousness and some mental status changes that initially  were attributed to possible alcohol withdrawal given his heavy alcohol use; however, his symptoms have worsened today.  He is more lethargic and so he has been transferred to the intensive care unit for further evaluation.  It is unclear what his urine output is, he has been incontinent and Larios catheter is being placed.  His urine studies revealed a urine sodium of less than 10 and a urine osmolality of 199 and a serum osmolality of 349.  As stated above, his creatinine has improved throughout his hospital stay, although it is slightly higher this afternoon at 1.59.      DAILY NEPHROLOGY SUMMARY:  8/11/17--Remains confused, evidence of EtOH withdrawal, urine studies consistent with central DI as urine osm did increase after DDAVP, after larios placed pt having large urinary output, Ca improved, BP stable  8/12/17 - Sodium normal, agree on decreasing fluid, D/C calcitonin   8/14/17 - NAEO per RN staff, overall unchanged mental status  8/15/17 - NAEO, no issues per RN staff, no major changes overall  8/16/17 - Intubated as he was tiring out and wife did not wan't to let him pass until his brother can arrive to help make decision on 8/17      Review of Systems   Unable to perform ROS: patient nonverbal         Physical Exam   Constitutional: He appears well-developed and well-nourished.   HENT:   Head: Normocephalic and atraumatic.   Eyes: Conjunctivae are normal. No scleral icterus.   Neck: Neck supple. No tracheal deviation present.   Cardiovascular: Normal rate and regular rhythm.    Pulmonary/Chest: Effort normal and breath sounds normal.   Abdominal: Soft. Bowel sounds are normal.   Musculoskeletal: He exhibits no edema or tenderness.   Neurological: He is alert. He displays no atrophy. He exhibits normal muscle tone.   Skin: Skin is warm and dry.   Psychiatric: He is agitated. He is noncommunicative.   Nursing note and vitals reviewed.        PAST FAMILY HISTORY: Reviewed and Unchanged  SOCIAL HISTORY: Reviewed  and Unchanged  CURRENT MEDICATIONS: Reviewed  LABORATORY RESULTS: Reviewed  IMAGING STUDIES: Reviewed      Fluids:    Date 08/16/17 0700 - 08/17/17 0659   Shift 3645-4019 1570-8689 7038-9265 24 Hour Total   I  N  T  A  K  E   I.V. 930.7   930.7      Propofol Volume 85.9   85.9      Norepinephrine Volume 112.8   112.8      IV Volume (Potassium Phos) 332   332      IV Volume (LR) 400   400    Other 30   30      Medications (P.O./ Enteral Liquids) 30   30    Enteral 600   600      Enteral Volume 370   370      Free Water / Tube Flush 230   230    Shift Total 1560.7   1560.7   O  U  T  P  U  T   Urine 600   600      Indwelling Cathether 600   600    Shift Total 600   600   .7   960.7         IMPRESSION:  - JOSE L    * Etiology likely 2/2 pre renal  - Hypernatremia    * Etiology 2/2 central DI  - Hypercalcemia  - AMS    * Likely combination of metabolic derangements and EtOH use  - Left lung mass  - HTN    ASSESSMENT:  - GFR: BCr unknown, likely at baseline now  - Urine: non-oliguric  - BP: Goal < 140/90  - Volume: euvolemic  - Acid/Base: no sig. acid base disturbance  - Electrolytes: stable  - Anemia: Goal Hgb 10-11  - MBD: Ca normal, PO4 normal  - HD Access: None    PLAN:  - No compelling indication for RRT at this time  - Continue ddAVP 2 mcg BID  - Continue free water bolus  - D/C LR  - Monitor I's and O's  - Daily labs  - [facility] protocol  - Pamidronate given 8/10/17  - Family meeting tomorrow 8/17, agree with pursuing hospice/comfort care

## 2017-08-16 NOTE — PROGRESS NOTES
Pt transported to MRI on [facility] with RN, RT and MRI tech. Pt attached to monitor.

## 2017-08-16 NOTE — CARE PLAN
Problem: Respiratory:  Goal: Respiratory status will improve  Will monitor pt's respiratory status on ventilator and suction as needed, status not improved at this time    Problem: Pain Management  Goal: Pain level will decrease to patient’s comfort goal  Spoke with the team this morning about pt's pain level and intervention, plan to place pt back on scheduled pain meds for assessment reason and diagnosis

## 2017-08-16 NOTE — CONSULTS
Reason for PC Consult: Advance Care Planning    Consulted by: Dr. Jenny MD [facility]    Assessment:  General: 62 y/o male admitted for back pain, ALOC, JOSE L and ETOH WD. MRI completed for back pain and found to have lung mass with presumed metastasis to liver and bone. Patient intubated/sedated at wife's request; bronchoscopy done and biopsy obtained as well. Awaiting path. PMHx of HTN, DLD, ETOH and back pain x 3 months    Consults: Nephrology, PMA    Dyspnea: Yes- Vent support; no SBT trials  Last BM: 08/16/17-    Pain: No-    Depression: Unable to determine; ventilated/sedated  Dementia: Unable to Determine; ventilated/sedated      Spiritual:  Is Muslim or spirituality important for coping with this illness? Unable to determine-    Has a  or spiritual provider visit been requested? Unable to determine    Palliative Performance Scale: 20%    Advance Directive: None-    DPOA: No- NOK is wife Елена  POLST: No-      Code Status: DNR-      Outcome:  PC RN visited bedside and attended ICU rounds for this patient. Team awaiting pathology but presumed stage 4 lung cancer. Wife agreed with intubation to allow for time to determine POC and allow his brother to see him. Per conversation with the MD, a family meeting occurred today and the plan is to WD care tomorrow once Victor Manuel's brother arrives. Palliative will reach out to bedside team Thursday morning to determine involvement when family arrives. MD requesting family support and guidance once extubated; PC appreciative of the consult and ability to be involved.     Updated: MD aware    Plan: f/u with BS RN in AM to alert PC when family arrives    Thank you for allowing Palliative Care to participate in this patient's care. Please feel free to call [phone] with any questions or concerns.

## 2017-08-16 NOTE — CARE PLAN
Problem: Ventilation Defect:  Goal: Ability to achieve and maintain unassisted ventilation or tolerate decreased levels of ventilator support  Intervention: Support and monitor invasive and noninvasive mechanical ventilation  Adult Ventilation Update    Total Vent Days: 1      Patient Lines/Drains/Airways Status    Active Airway      **None**                                   Static Compliance (ml / cm H2O): 50 (08/15/17 1545)    Patient failed trials because of    Barriers to SBT    Length of Weaning Trial                      Cough: Congested;Productive (08/15/17 1545)  Sputum Amount: Small (08/15/17 1545)  Sputum Color: Tan;White (08/15/17 1545)  Sputum Consistency: Thick (08/15/17 1545)    Mobility Group  Activity Performed: Unable to mobilize (08/15/17 1200)  Time Activity Tolerated: 30 min (08/08/17 0800)  Distance Per Occurrence (ft.): 200 feet (08/08/17 0800)  # of Times Distance was Traveled: 2 (08/08/17 0800)  Assistance / Tolerance: Assistance of Two or More (08/10/17 2000)  Pt Calls for Assistance: No (08/15/17 1200)  Staff Present for Mobilization: RN;CNA;Other (comment) (security) (08/08/17 0800)  Gait: Unable to Ambulate (08/10/17 1800)  Assistive Devices: Hand held assist (08/08/17 0800)  Reason Not Mobilized: Patient refused - pain;Patient refused, education provided (Pt non compliant with care) (08/15/17 1200)  Mobilization Comments: RASS +3 with stimulation (08/12/17 2000)    Events/Summary/Plan: Pt. intubated , vented and a bronchoscopy was done.  Specimens were sent to the lab.  Pt. Tolerated all well. (08/15/17 1545)

## 2017-08-16 NOTE — CARE PLAN
Problem: Safety  Goal: Will remain free from falls  Intervention: Assess risk factors for falls    08/15/17 2000   OTHER   Fall Risk High Risk to Fall - 2 or more points    Risk for Injury-Any positive answers results in the pt being at high risk for fall related injury Alcohol Abuse   Mobility Status Assessment 2-2 Healthcare Providers Required for Assistance with Ambulation & Transfer   History of fall 0   Pt Calls for Assistance No       Intervention: Implement fall precautions    08/11/17 2000 08/15/17 2000   OTHER   Environmental Precautions --  Treaded Slipper Socks on Patient;Personal Belongings, Wastebasket, Call Bell etc. in Easy Reach;Report Given to Other Health Care Providers Regarding Fall Risk;Communication Sign for Patients & Families;Bed in Low Position;Mobility Assessed & Appropriate Sign Placed   IV Pole on Same Side of Bed as Bathroom (n/a) --    Bedrails --  Alternative to Bedrails Used   Chair/Bed Strip Alarm --  Yes - Alarm On   Bed Alarm (Built in - for ICU ONLY) --  Yes - Alarm On           Problem: Skin Integrity  Goal: Risk for impaired skin integrity will decrease  Intervention: Implement precautions to protect skin integrity in collaboration with the interdisciplinary team    08/15/17 2000   OTHER   Skin Preventative Measures Pillows in Use for Support / Positioning;Heel Float Boots in Use    Bed Types Low Air Loss Mattress   Friction Interventions Draw Sheet / Pad Used for Repositioning   Moisturizers Barrier Wipes;Moisturizer    Activity  Bed   Patient Turns / Repositioning Left Side   Assistance / Tolerance for Turning/Repositioning Assistance of Two or More   Patient is Receiving Nutrition Tube Feeding Nutrition   Nutrition Consult Ordered No, Consult has Already been Placed   Vitamin Therapy in Use No

## 2017-08-17 NOTE — PALLIATIVE CARE
"Palliative Care follow-up  Call received from BS RN noting family at bedside; updates received from RN upon arrival to the unit (thank you!). PC RN visited bedside to provide support to Елена and the family prior to withdrawal of care. They expressed having originally wanted the grandchildren (ages 8 and 9) to see him but then decided they'd prefer they have their past memories to reflect upon and not see him in his current condition. PC RN supported their decision to protect their memories of their grandfather. Елена expressed waiting for Victor Manuel's best friend Ilan to arrive, then removing life support. The propofol had been lessened to allow the family to be interactive and the patient was requesting the tube to be removed and even tearful with PC at bedside. The family expressed being \"ready to carry out his wishes and end his suffering.\" PC RN provided emotional support and guidance for the next steps. Елена denied wanting a  or other spiritual provider visit. She and Victor Manuel's brother denied any needs or outstanding questions at this time. PC RN provided contact information for any future questions or concerns, support after he passes, or if she wishes to have resources for the grandchildren. She was appreciative of the visit and support.      Updated: BS RN    Plan: continue to support as needed    Thank you for allowing Palliative Care to participate in this patient's care. Please feel free to call [phone] with any questions or concerns.  "

## 2017-08-17 NOTE — PROGRESS NOTES
Enriqueta from Lab called with critical result of calcium of 6.8 at 0609. Critical lab result read back to Enriqueta.   This critical lab result is within parameters established by [facility] for this patient.

## 2017-08-17 NOTE — PROGRESS NOTES
[facility] -  PROGRESS NOTE               Author: Meenu Gilbert M.D. Date & Time: 8/17/2017  11:13 AM     HPI:  This is a 61-year-old  male with a past medical history significant for hypertension, hyperlipidemia, alcohol abuse who was initially admitted with severe back pain and was found to have a left upper lung mass with suspected bony and liver metastases and now noted to have acute hypernatremia and mental status changes within the past 24 hours.  The patient at this time is confused and lethargic and is unable to give me any history.  The majority of history was obtained through review of the medical records and discussion with the other physicians on the case.  The patient was initially admitted with complaints of severe back pain and he had imaging done as an outpatient that revealed liver and bony mets of unknown primary.  He subsequently underwent a CT scan of the chest that showed a left upper lobe lung mass and so he was admitted for further diagnostic evaluation as well as for pain control.  When patient was initially admitted, he was noted to have an elevated creatinine of 2.39.  It is unclear what his baseline creatinine is and it is unclear if the CT scan done as an outpatient involved IV contrast.  He was hydrated with normal saline with improvement in his renal function.  However, his sodium level started to increase, it was 132 on admission, it was 141 yesterday and this morning's labs revealed a sodium level of 157 and acute change within the past 24 hours and repeat labs later in the morning revealed a sodium level of 163, the patient's normal saline was discontinued.  He was started on D5W.  It should be noted that according to the primary service the patient's mental status was fairly stable on admission.  He was alert and oriented; however, starting yesterday, patient was noted to have increased tremulousness and some mental status changes that initially  were attributed to possible alcohol withdrawal given his heavy alcohol use; however, his symptoms have worsened today.  He is more lethargic and so he has been transferred to the intensive care unit for further evaluation.  It is unclear what his urine output is, he has been incontinent and Larios catheter is being placed.  His urine studies revealed a urine sodium of less than 10 and a urine osmolality of 199 and a serum osmolality of 349.  As stated above, his creatinine has improved throughout his hospital stay, although it is slightly higher this afternoon at 1.59.      DAILY NEPHROLOGY SUMMARY:  8/11/17--Remains confused, evidence of EtOH withdrawal, urine studies consistent with central DI as urine osm did increase after DDAVP, after larios placed pt having large urinary output, Ca improved, BP stable  8/12/17 - Sodium normal, agree on decreasing fluid, D/C calcitonin   8/14/17 - NAEO per RN staff, overall unchanged mental status  8/15/17 - NAEO, no issues per RN staff, no major changes overall  8/16/17 - Intubated as he was tiring out and wife did not wan't to let him pass until his brother can arrive to help make decision on 8/17 8/17/17 - NAEO, status unchanged, Na improved with free water, remains on pressors and intubated      Review of Systems   Unable to perform ROS: patient nonverbal         Physical Exam   Constitutional: He appears well-developed and well-nourished.   HENT:   Head: Normocephalic and atraumatic.   Eyes: Conjunctivae are normal. No scleral icterus.   Neck: Neck supple. No tracheal deviation present.   Cardiovascular: Normal rate and regular rhythm.    Pulmonary/Chest: Effort normal and breath sounds normal.   Abdominal: Soft. Bowel sounds are normal.   Musculoskeletal: He exhibits no edema or tenderness.   Neurological: He is alert. He displays no atrophy. He exhibits normal muscle tone.   Skin: Skin is warm and dry.   Psychiatric: He is agitated. He is noncommunicative.   Nursing note  and vitals reviewed.        PAST FAMILY HISTORY: Reviewed and Unchanged  SOCIAL HISTORY: Reviewed and Unchanged  CURRENT MEDICATIONS: Reviewed  LABORATORY RESULTS: Reviewed  IMAGING STUDIES: Reviewed      Fluids:    Date 08/17/17 0700 - 08/18/17 0659   Shift 8914-7917 9587-5129 2321-5332 24 Hour Total   I  N  T  A  K  E   I.V. 154.5   154.5      Propofol Volume 84.4   84.4      Norepinephrine Volume 70.1   70.1    Other 60   60      Medications (P.O./ Enteral Liquids) 60   60    Enteral 420   420      Enteral Volume 220   220      Free Water / Tube Flush 200   200    Shift Total 634.5   634.5   O  U  T  P  U  T   Urine 200   200      Indwelling Cathether 200   200    Shift Total 200   200   .5   434.5         IMPRESSION:  - JOSE L    * Etiology likely 2/2 pre renal  - Hypernatremia    * Etiology 2/2 central DI  - Hypercalcemia  - AMS    * Likely combination of metabolic derangements and EtOH use  - Left lung mass  - HTN    ASSESSMENT:  - GFR: BCr unknown, likely at baseline now  - Urine: non-oliguric  - BP: Goal < 140/90  - Volume: euvolemic  - Acid/Base: no sig. acid base disturbance  - Electrolytes: stable  - Anemia: Goal Hgb 10-11  - MBD: Ca normal, PO4 normal  - HD Access: None    PLAN:  - No compelling indication for RRT at this time  - Continue ddAVP 2 mcg BID  - Continue free water bolus  - DC IVFs  - Monitor I's and O's  - Daily labs  - Family meeting tomorrow 8/17, agree with pursuing hospice/comfort care  - Will take a step back from case at this time and see him as needed, please call us back for any questions or concerns that may develop  - Poor prognosis

## 2017-08-17 NOTE — CARE PLAN
Problem: Safety  Goal: Will remain free from falls  Intervention: Assess risk factors for falls    08/16/17 2000   OTHER   Fall Risk High Risk to Fall - 2 or more points    Risk for Injury-Any positive answers results in the pt being at high risk for fall related injury Alcohol Abuse   Mobility Status Assessment 2-2 Healthcare Providers Required for Assistance with Ambulation & Transfer   History of fall 0   Pt Calls for Assistance No       Intervention: Implement fall precautions    08/11/17 2000 08/16/17 2000   OTHER   Environmental Precautions --  Treaded Slipper Socks on Patient;Personal Belongings, Wastebasket, Call Bell etc. in Easy Reach;Bed in Low Position;Report Given to Other Health Care Providers Regarding Fall Risk;Communication Sign for Patients & Families;Mobility Assessed & Appropriate Sign Placed   IV Pole on Same Side of Bed as Bathroom (n/a) --    Bedrails --  Alternative to Bedrails Used   Chair/Bed Strip Alarm --  Yes - Alarm On   Bed Alarm (Built in - for ICU ONLY) --  Yes - Alarm On           Problem: Skin Integrity  Goal: Risk for impaired skin integrity will decrease  Intervention: Implement precautions to protect skin integrity in collaboration with the interdisciplinary team    08/16/17 2000   OTHER   Skin Preventative Measures Pillows in Use for Support / Positioning;Heel Float Boots in Use    Bed Types Low Air Loss Mattress   Friction Interventions Draw Sheet / Pad Used for Repositioning   Moisturizers Barrier Wipes;Containment Devices;Moisturizer    Containment Devices Gramajo as Rectal Tube   Activity  Bed   Patient Turns / Repositioning Left Side   Assistance / Tolerance for Turning/Repositioning Assistance of Two or More   Patient is Receiving Nutrition Tube Feeding Nutrition   Nutrition Consult Ordered No, Consult has Already been Placed   Vitamin Therapy in Use No

## 2017-08-17 NOTE — PROGRESS NOTES
Patient medicated per MAR and extubated at 1242 by RT; RN and MD at bedside. Family returned to bedside after extubation.

## 2017-08-17 NOTE — PALLIATIVE CARE
Palliative Care follow-up  PC RN spoke with BS RN; no family currently present. BS RN will call PC when wife arrives to bedside.    Plan: provide support to family     Thank you for allowing Palliative Care to participate in this patient's care. Please feel free to call [phone] with any questions or concerns.

## 2017-08-17 NOTE — PROGRESS NOTES
Paged by nursing as family is ready to withdraw care. Family was at bedside and patient was off sedation. Family reports they are ready to withdraw care at which point the patient shook his head in the affirmative that he wanted care to be withdrawn. Orders placed for comfort care and extubation.

## 2017-08-17 NOTE — PROGRESS NOTES
Pulmonary Critical Care Progress Note        DOS:  8/17/2017    Chief Complaint: ALOC, bone mets    History of Present Illness: 61 y.o. M, h/o HTN, DLD, EtOH use, had MRI for back pain, found to have CAROLYN lung mass, ? CAROLYN carcinomatosis, bone and liver mets; admitted 8/7 with EtOH w/d, ALOC, JOSE L, hyponatremic    ROS:  Respiratory: unable to perform due to the patient's inability to effectively communicate, Cardiac: unable to perform due to the patient's inability to effectively communicate, GI: unable to perform due to the patient's inability to effectively communicate.  All other systems negative.    Interval Events:  24 hour interval history reviewed   - int f/c's  - fentanyl gtt  - propofol  - NE 10 mcg/min  - Tm 102.5  - diarrhea  - vent day 3  - replace K and Mg  - Unasyn    PFSH:  No change.    Respiratory:  Morel Vent Mode: APVCMV, Rate (breaths/min): 18, Vt Target (mL): 470, PEEP/CPAP: 8, FiO2: 30, Static Compliance (ml / cm H2O): 115  Pulse Oximetry: 96 %  Chest Tube Drains:          Exam: rhonchi throughout all lung fields and diminished breath sounds moderate  ImagingAvailable data reviewed   Recent Labs      08/15/17   1812  08/16/17   0449  08/17/17   0429   ISTATAPH  7.458  7.492  7.524*   ISTATAPCO2  43.0*  37.9*  33.1   ISTATAPO2  205*  109*  76   ISTATATCO2  32  30  28   [id number]  100*  99  97   ISTATARTHCO3  30.5*  29.1*  27.3*   ISTATARTBE  6*  5*  4*   ISTATTEMP  100.1 F  99.8 F  101.5 F   ISTATFIO2  80  40  30   ISTATSPEC  Arterial  Arterial  Arterial   ISTATAPHTC  7.446  7.482  7.499   [id number]  209*  113*  85       HemoDynamics:  Pulse: (!) 101, Heart Rate (Monitored): (!) 101  NIBP: 127/67 mmHg       Exam: sinus tachycardia  Imaging: Available data reviewed        Neuro:  GCS Total Jorge Coma Score: 10       Exam: Heavily sedated  Imaging: Available data reviewed    Fluids:  Intake/Output       08/15/17 0700 - 08/16/17 0659 08/16/17 0700 - 08/17/17 0659 08/17/17 0700 - 08/18/17  0659 0700-1859 1900-0659 Total 0700-1859 1900-0659 Total 0700-1859 1900-0659 Total       Intake    I.V.  1288.9  1552.6 2841.5  1291.7  433.9 1725.6  --  -- --    Propofol Volume 16.5 137.5 154 168.1 208.3 376.4 -- -- --    Norepinephrine Volume 72.4 215.2 287.5 225.6 225.6 451.2 -- -- --    IV Volume (Potassium Phos) -- -- -- 498 -- 498 -- -- --    IV Volume (LR) 1200 1200 2400 400 0 400 -- -- --    Other  60  90 150  60  90 150  --  -- --    Medications (P.O./ Enteral Liquids) 60 90 150 60 90 150 -- -- --    Enteral  1210  1300 2510  1130  1180 2310  --  -- --    Enteral Volume   -- -- --    Free Water / Tube Flush 430 520 950 430 520 950 -- -- --    Total Intake 2558.9 2942.6 5501.5 2481.7 1703.9 4185.6 -- -- --       Output    Urine  1035  945 1980  1055  1030 2085  --  -- --    Indwelling Cathether 1259 979 4343 1055 1030 2085 -- -- --    Drains  500  200 700  660  400 1060  --  -- --    Rectal Tube 500 200 700  -- -- --    Total Output 1535 1145 2680 1715 1430 3145 -- -- --       Net I/O     1023.9 1797.6 2821.5 766.7 273.9 1040.6 -- -- --        Weight: 75.9 kg (167 lb 5.3 oz)  Recent Labs      08/15/17   0445   08/16/17   0410  08/16/17   1613  08/17/17   0515   SODIUM  143   < >  148*  144  144   POTASSIUM  3.1*   < >  3.1*  3.1*  3.2*   CHLORIDE  110   < >  112  110  111   CO2  27   < >  27  27  27   BUN  21   < >  18  17  16   CREATININE  0.98   < >  0.89  0.89  0.76   MAGNESIUM  1.5   --   2.1   --   1.7   PHOSPHORUS  2.9   --   2.3*   --   2.5   CALCIUM  7.7*   < >  7.1*  6.9*  6.8*    < > = values in this interval not displayed.       GI/Nutrition:  Exam: abdomen is soft and non-tender  Imaging: Available data reviewed  NPO and tube feed Tolerated  Liver Function  Recent Labs      08/15/17   0445   08/16/17   0410  08/16/17   1613  08/17/17   0515   ALTSGPT  14   --   15   --   23   ASTSGOT  19   --   16   --   22   ALKPHOSPHAT  373*   --   305*   --   270*    TBILIRUBIN  0.3   --   0.2   --   0.2   GLUCOSE  120*   < >  135*  135*  130*    < > = values in this interval not displayed.       Heme:  Recent Labs      08/15/17   0445  08/16/17   0410  08/17/17   0515   RBC  2.83*  2.65*  2.63*   HEMOGLOBIN  9.2*  8.5*  8.4*   HEMATOCRIT  27.6*  26.2*  26.1*   PLATELETCT  100*  132*  196       Infectious Disease:  Temp  Av.4 °C (101.1 °F)  Min: 37.8 °C (100 °F)  Max: 39.1 °C (102.4 °F)  Micro: reviewed  Recent Labs      08/15/17   0445  08/16/17   0410  08/17/17   0515   WBC  11.0*  11.0*  11.7*   NEUTSPOLYS   --   70.40  70.40   LYMPHOCYTES   --   15.70*  9.50*   MONOCYTES   --   5.20  3.50   EOSINOPHILS   --   4.30  8.70*   BASOPHILS   --   0.90  0.00   ASTSGOT  19  16  22   ALTSGPT  14  15  23   ALKPHOSPHAT  373*  305*  270*   TBILIRUBIN  0.3  0.2  0.2     Current Facility-Administered Medications   Medication Dose Frequency Provider Last Rate Last Dose   • potassium chloride in water (KCL) ivpb **Administer in ICU only** 40 mEq  40 mEq Once Gurpreet Penaloza PHARMD 50 mL/hr at 17 0807 40 mEq at 17 0807   • magnesium sulfate IVPB premix 4 g  4 g Once Gurpreet Penaloza PHARMD 25 mL/hr at 17 0818 4 g at 17 0818   • fentaNYL (SUBLIMAZE) 50 mcg/mL in 50mL (Continuous Infusion)   Continuous Benjie Rodriguez M.D. 1 mL/hr at 17 1730 50 mcg at 17 1730   • quetiapine (SEROQUEL) tablet 50 mg  50 mg TID Benjie Rodriguez M.D.   50 mg at 17 0817   • MD ALERT...Adult ICU Electrolyte Replacement per Pharmacy Protocol   PRN Benjie Rodriguez M.D.       • propofol (DIPRIVAN) injection  0-80 mcg/kg/min Continuous Benjie Rodriguez M.D. 22.8 mL/hr at 17 0713 50 mcg/kg/min at 17 0713   • senna-docusate (PERICOLACE or SENOKOT S) 8.6-50 MG per tablet 2 Tab  2 Tab BID Benjie Rodriguez M.D.   Stopped at 17 2100    And   • polyethylene glycol/lytes (MIRALAX) PACKET 1 Packet  1 Packet QDAY PRN Benjie Rodriguez M.D.        And   • magnesium hydroxide  (MILK OF MAGNESIA) suspension 30 mL  30 mL QDAY PRN Benjie Rodriguez M.D.        And   • bisacodyl (DULCOLAX) suppository 10 mg  10 mg QDAY PRN Benjie Rodriguez M.D.       • chlorhexidine (PERIDEX) 0.12 % solution 15 mL  15 mL BID Benjie Rodriguez M.D.   15 mL at 08/17/17 0817   • lidocaine (XYLOCAINE) 1%  injection  1-2 mL Q30 MIN PRN Benjie Rodriguez M.D.       • fentaNYL (SUBLIMAZE) injection 25 mcg  25 mcg Q HOUR PRN Benjie Rodriguez M.D.        Or   • fentaNYL (SUBLIMAZE) injection 50 mcg  50 mcg Q HOUR PRN Benjie Rodriguez M.D.        Or   • fentaNYL (SUBLIMAZE) injection 100 mcg  100 mcg Q HOUR PRN Benjie Rodriguez M.D.   100 mcg at 08/16/17 0520   • ipratropium-albuterol (DUONEB) nebulizer solution 3 mL  3 mL Q2HRS PRN (RT) Benjie Rodriguez M.D.       • ipratropium-albuterol (DUONEB) nebulizer solution 3 mL  3 mL Q4HRS (RT) Benjie Rodriguez M.D.   3 mL at 08/17/17 0627   • famotidine (PEPCID) tablet 20 mg  20 mg Q12HRS Benjie Rodriguez M.D.   20 mg at 08/17/17 0817    Or   • famotidine (PEPCID) injection 20 mg  20 mg Q12HRS Benjie Rodriguez M.D.       • guaiFENesin (ROBITUSSIN) 100 MG/5ML solution 200 mg  10 mL Q6HRS Ananda Jo M.D.   200 mg at 08/17/17 0506   • acetaminophen (TYLENOL) tablet 650 mg  650 mg Q6HRS PRN Ananda Jo M.D.   650 mg at 08/17/17 0506   • norepinephrine (LEVOPHED) 8 mg in  mL Infusion  0-30 mcg/min Continuous Ananda Jo M.D. 18.8 mL/hr at 08/17/17 0608 10 mcg/min at 08/17/17 0608   • phosphorus (K-PHOS-NEUTRAL, PHOSPHA 250 NEUTRAL) per tablet 1 Tab  1 Tab Q6HRS Ananda Jo M.D.   1 Tab at 08/17/17 0506   • desmopressin (DDAVP) injection 2 mcg  2 mcg Q12HRS Jessika Carlson M.D.   2 mcg at 08/17/17 0900   • enoxaparin (LOVENOX) inj 40 mg  40 mg DAILY Ananda Jo M.D.   40 mg at 08/17/17 0817   • Respiratory Care per Protocol   Continuous RT Stacy Gallegos M.D.       • ampicillin/sulbactam (UNASYN) 3 g in  mL IVPB  3 g Q6HRS  Ananda Jo M.D.   Stopped at 08/17/17 0537   • nicotine (NICODERM) 21 MG/24HR 21 mg  21 mg Daily-0600 Stacy Gallegos M.D.   21 mg at 08/17/17 0506   • ondansetron (ZOFRAN) syringe/vial injection 4 mg  4 mg Q4HRS PRN Stacy Gallegos M.D.       • ondansetron (ZOFRAN ODT) dispertab 4 mg  4 mg Q4HRS PRN Stacy Gallegos M.D.   4 mg at 08/07/17 2014   • promethazine (PHENERGAN) tablet 12.5-25 mg  12.5-25 mg Q4HRS PRN Stacy Gallegos M.D.       • promethazine (PHENERGAN) suppository 12.5-25 mg  12.5-25 mg Q4HRS PRN Stacy Gallegos M.D.       • prochlorperazine (COMPAZINE) injection 5-10 mg  5-10 mg Q4HRS PRN Stacy Gallegos M.D.         Last reviewed on 8/7/2017  6:55 PM by Ev Beyer [facility]    Quality  Measures:  Radiology images reviewed, Medications reviewed, Labs reviewed and EKG reviewed                      Assessment and Plan:    Acute unspecified encephalopathy              - toxic metabolic encephalopathy               - MRI with diffuse metastatic dz  Alcohol withdrawal ?              - phenobarbital completed              - rally vits  Aspiration pneumonia              - Unasyn              - mucolytic  Acute hypoxic respiratory    - intubated 8/15   - full support for short course; VE adjusted   - During intubation and subsequent bronchoscopy, evidence of significant aspiration with complete filling of the right lower lobe bronchus with aspirated thick secretions  Left hilar mass highly suspicious for bronchogenic carcinoma with evidence of liver and bony metastases              - bronch and endobronchial biopsies from left upper lobe obtained 8/15 - await path  Hypercalcemia due to bony metastases                      - improved with hydration, calcitonin                          - stopped calcitonin                - pamidronate  Acute kidney injury - improved with hydration  Hypernatremia - consistent with central diabetes insipidus              - resolved              - decrease free  water              - follow Na closely              - DDAVP              - MRI noted   Back pain due to osseous metastases              - cont analgesia  Systemic arterial hypertension              - cont BP control  Dyslipidemia  Tobacco abuse  Alcohol abuse              - vitamins  D/w family again; plan for compassionate w/d of support and comfort measures   Discussed patient condition and risk of morbidity and/or mortality with Family, RN, RT and QA team.    The patient remains critically ill.  Critical care time = 35 minutes in directly providing and coordinating critical care and extensive data review.  No time overlap and excludes procedures.

## 2017-08-17 NOTE — CARE PLAN
Problem: Bronchoconstriction:  Goal: Improve in air movement and diminished wheezing  DUO Q4    Problem: Ventilation Defect:  Goal: Ability to achieve and maintain unassisted ventilation or tolerate decreased levels of ventilator support  Adult Ventilation Update    Total Vent Days: 3   18/470/8/30%             8.5@25  Patient Lines/Drains/Airways Status    Active Airway      Name: Placement date: Placement time: Site: Days:     Airway Group ET Tube 8.5 08/15/17  1607    1                 Plateau Pressure (Q Shift): 16 (08/16/17 0621)  Static Compliance (ml / cm H2O): 58.5 (08/17/17 0213)    Barriers to SBT Weaning Trial Stopped due to:: Pt weaned for 1 hour and returned to rest settings per protocol (08/16/17 0757)  Length of Weaning Trial Length of Weaning Trial (Hours): 1 (08/16/17 0757)    Sputum/Suction   Cough: Moist;Productive (08/17/17 0400)  Sputum Amount: Moderate (08/17/17 0400)  Sputum Color: Tan;White (08/17/17 0400)  Sputum Consistency: Thick (08/17/17 0400)    Mobility Group  Activity Performed: Unable to mobilize (08/16/17 2000)  Time Activity Tolerated: 30 min (08/08/17 0800)  Distance Per Occurrence (ft.): 200 feet (08/08/17 0800)  # of Times Distance was Traveled: 2 (08/08/17 0800)  Assistance / Tolerance: Assistance of Two or More (08/10/17 2000)  Pt Calls for Assistance: No (08/16/17 2000)  Staff Present for Mobilization: RN;CNA;Other (comment) (security) (08/08/17 0800)  Gait: Unable to Ambulate (08/10/17 1800)  Assistive Devices: Hand held assist (08/08/17 0800)  Reason Not Mobilized: Unstable condition (increased vasopressor requirements  ) (08/16/17 2000)  Mobilization Comments: RASS +3 with stimulation (08/14/17 0400)

## 2017-08-17 NOTE — CARE PLAN
Problem: Communication  Goal: The ability to communicate needs accurately and effectively will improve  Providing emotional support for patient and family.     Problem: Pain Management  Goal: Pain level will decrease to patient’s comfort goal  Fentanyl gtt in use for pain control, assessing pain at regular intervals using CPOT, documented in EPIC.

## 2017-08-18 NOTE — DISCHARGE SUMMARY
DEATH SUMMARY     Admit Date:  8/7/2017       Discharge Date:   8/17/17    Service:   [facility]  Attending Physician(s):   Dr. Becker       Senior Resident(s):   Dr. Alvarado       Primary Cause of Death:  Cardio-respiratory failure due to metastatic lung cancer    Primary Diagnosis:   Non small cell carcinoma of the lung    Hospital Summary (Brief Narrative):       Patient had been seeing his primary care physician for work up of back pain and found to have metastatic disease in his lungs, liver, and lumber vertebrae. He was then sent to the hospital for continued work up of his cancer as well as uncontrollable back pain. He developed altered mental status upon admission as well as respiratory distress which ultimately led to intubation and ventilator support. At this point tissue samples were taken as well as an MRI to evaluate for metastatic disease in his brain. MRI results showed numerous metastatic lesions in his brain. After discussion with his wife and family it was felt that his wishes would not be for treatment and to instead withdraw care. Once all family was bedside his sedation was turned off where he then expressed agreement to withdraw care. He was then placed on comfort care and extubated following which he passed peacefully approximately 5 hours later.    Consultants:      Pulm/CC: Dr. Rodriguez